# Patient Record
Sex: MALE | Race: WHITE | NOT HISPANIC OR LATINO | Employment: FULL TIME | URBAN - METROPOLITAN AREA
[De-identification: names, ages, dates, MRNs, and addresses within clinical notes are randomized per-mention and may not be internally consistent; named-entity substitution may affect disease eponyms.]

---

## 2017-01-03 ENCOUNTER — HOSPITAL ENCOUNTER (OUTPATIENT)
Dept: RADIATION ONCOLOGY | Facility: MEDICAL CENTER | Age: 57
End: 2017-01-31
Attending: RADIOLOGY
Payer: COMMERCIAL

## 2017-01-03 PROCEDURE — 77387 GUIDANCE FOR RADJ TX DLVR: CPT | Performed by: RADIOLOGY

## 2017-01-03 PROCEDURE — 77427 RADIATION TX MANAGEMENT X5: CPT | Performed by: RADIOLOGY

## 2017-01-03 PROCEDURE — 77412 RADIATION TX DELIVERY LVL 3: CPT | Performed by: RADIOLOGY

## 2017-01-03 PROCEDURE — 77014 PR CT GUIDANCE PLACEMENT RAD THERAPY FIELDS: CPT | Mod: 26 | Performed by: RADIOLOGY

## 2017-02-14 ENCOUNTER — HOSPITAL ENCOUNTER (OUTPATIENT)
Dept: RADIATION ONCOLOGY | Facility: MEDICAL CENTER | Age: 57
End: 2017-02-28
Attending: RADIOLOGY
Payer: COMMERCIAL

## 2017-02-14 DIAGNOSIS — C78.00 PROSTATE CANCER METASTATIC TO LUNG (HCC): Primary | ICD-10-CM

## 2017-02-14 DIAGNOSIS — C61 PROSTATE CANCER METASTATIC TO LUNG (HCC): Primary | ICD-10-CM

## 2017-02-14 PROCEDURE — 99212 OFFICE O/P EST SF 10 MIN: CPT | Performed by: RADIOLOGY

## 2017-02-14 NOTE — PROCEDURES
DATE OF SERVICE:  02/14/2017    REFERRING PHYSICIAN:  Lisandro Price MD    HISTORY OF PRESENT ILLNESS:  He is now about 6 weeks status post radiation   therapy to the left lung and mediastinum, and 2-1/2 months status post   radiation therapy to the humeral head for metastatic prostate cancer.  He said   he is doing well.  The pain in the right shoulder has gotten better.  No   shortness of breath, esophagitis or cough.  He is continuing on Zytiga.  His   PSA has dropped down to 0.3.  He is otherwise doing well without any major   complaints.    PHYSICAL EXAMINATION:  VITAL SIGNS:  Weight 210, temperature 98.3, pulse 74, blood pressure 114/71,   O2 sat 97%.  HEENT:  Normocephalic, atraumatic.  Sclerae are anicteric.  Extraocular   movements intact.  Ears, nose, mouth within normal limits.  No preauricular,   neck, superclavicular or axillary ellen adenopathy.  HEART:  Regular rate.  LUNGS:  Clear.  ABDOMEN:  Soft, nontender without hepatosplenomegaly.  MUSCULOSKELETAL:  He has no tenderness in the right shoulder or humeral head.    IMPRESSION:  Excellent response to radiation.    PLAN:  I would like to see him back in 3 months with a CT scan of the chest at   that time.       ____________________________________     MD CARLOS ENAMORADO / MARSHALL    DD:  02/14/2017 11:27:39  DT:  02/14/2017 11:41:18    D#:  [id number]  Job#:  [id number]

## 2017-02-24 ENCOUNTER — OFFICE VISIT (OUTPATIENT)
Dept: MEDICAL GROUP | Facility: PHYSICIAN GROUP | Age: 57
End: 2017-02-24
Payer: COMMERCIAL

## 2017-02-24 VITALS
TEMPERATURE: 96.8 F | DIASTOLIC BLOOD PRESSURE: 70 MMHG | SYSTOLIC BLOOD PRESSURE: 118 MMHG | HEIGHT: 69 IN | OXYGEN SATURATION: 99 % | RESPIRATION RATE: 16 BRPM | HEART RATE: 83 BPM | BODY MASS INDEX: 31.1 KG/M2 | WEIGHT: 210 LBS

## 2017-02-24 DIAGNOSIS — E66.9 OBESITY (BMI 30-39.9): ICD-10-CM

## 2017-02-24 DIAGNOSIS — K63.5 COLON POLYPS: ICD-10-CM

## 2017-02-24 DIAGNOSIS — N52.9 ERECTILE DYSFUNCTION, UNSPECIFIED ERECTILE DYSFUNCTION TYPE: Chronic | ICD-10-CM

## 2017-02-24 DIAGNOSIS — C80.1 CANCER (HCC): Chronic | ICD-10-CM

## 2017-02-24 PROCEDURE — 99204 OFFICE O/P NEW MOD 45 MIN: CPT | Performed by: NURSE PRACTITIONER

## 2017-02-24 RX ORDER — SILDENAFIL 100 MG/1
100 TABLET, FILM COATED ORAL PRN
Qty: 2 TAB | Refills: 0 | Status: SHIPPED | OUTPATIENT
Start: 2017-02-24 | End: 2017-08-02

## 2017-02-24 ASSESSMENT — PATIENT HEALTH QUESTIONNAIRE - PHQ9: CLINICAL INTERPRETATION OF PHQ2 SCORE: 0

## 2017-02-24 NOTE — MR AVS SNAPSHOT
"        Rhys Yang   2017 4:00 PM   Office Visit   MRN: [id number]    Department:  [facility]   Dept Phone:  [phone]    Description:  Male : 1960   Provider:  ROSALBA Gauthier           Reason for Visit     Establish Care     Orders Needed Colonoscopy       Allergies as of 2017     No Known Allergies      You were diagnosed with     Cancer (CMS-HCC)   [845163]       Colon polyps   [358868]       Obesity (BMI 30-39.9)   [937009]       Erectile dysfunction, unspecified erectile dysfunction type   [3789467]         Vital Signs     Blood Pressure Pulse Temperature Respirations Height Weight    118/70 mmHg 83 36 °C (96.8 °F) 16 1.753 m (5' 9.02\") 95.255 kg (210 lb)    Body Mass Index Oxygen Saturation Smoking Status             31.00 kg/m2 99% Former Smoker         Basic Information     Date Of Birth Sex Race Ethnicity Preferred Language    1960 Male White Non- English      Problem List              ICD-10-CM Priority Class Noted - Resolved    Cancer (CMS-HCC) C80.1   2017 - Present    Colon polyps K63.5   2017 - Present    Obesity (BMI 30-39.9) E66.9   2017 - Present      Health Maintenance        Date Due Completion Dates    IMM DTaP/Tdap/Td Vaccine (1 - Tdap) 1977 ---    COLONOSCOPY 2008 ---    IMM INFLUENZA (1) 2016 ---            Current Immunizations     No immunizations on file.      Below and/or attached are the medications your provider expects you to take. Review all of your home medications and newly ordered medications with your provider and/or pharmacist. Follow medication instructions as directed by your provider and/or pharmacist. Please keep your medication list with you and share with your provider. Update the information when medications are discontinued, doses are changed, or new medications (including over-the-counter products) are added; and carry medication information at all times in the event of " emergency situations     Allergies:  No Known Allergies          Medications  Valid as of: February 24, 2017 -  7:23 PM    Generic Name Brand Name Tablet Size Instructions for use    Abiraterone Acetate   Take  by mouth.        Ascorbic Acid   Take  by mouth.        Cholecalciferol   Take  by mouth.        Cyanocobalamin   Take  by mouth.        Ibuprofen   Take  by mouth.        Leuprolide Acetate (3 Month) (Kit) LUPRON 22.5 MG 22.5 mg by Intramuscular route Once.        Multiple Vitamins-Minerals   Take 1 Tab by mouth every day.        Probiotic Product   Take  by mouth.        Selenium   Take  by mouth.        Sildenafil Citrate (Tab) VIAGRA 100 MG Take 1 Tab by mouth as needed for Erectile Dysfunction.        .                 Medicines prescribed today were sent to:     [facility] [address] [place] [address] [address] [address]    Phone: [phone] Fax: [phone]    Open 24 Hours?: No      Medication refill instructions:       If your prescription bottle indicates you have medication refills left, it is not necessary to call your provider’s office. Please contact your pharmacy and they will refill your medication.    If your prescription bottle indicates you do not have any refills left, you may request refills at any time through one of the following ways: The online [employer] system (except Urgent Care), by calling your provider’s office, or by asking your pharmacy to contact your provider’s office with a refill request. Medication refills are processed only during regular business hours and may not be available until the next business day. Your provider may request additional information or to have a follow-up visit with you prior to refilling your medication.   *Please Note: Medication refills are assigned a new Rx number when refilled electronically. Your pharmacy may indicate that no refills were authorized even though a new prescription for the same  medication is available at the pharmacy. Please request the medicine by name with the pharmacy before contacting your provider for a refill.        Referral     A referral request has been sent to our patient care coordination department. Please allow 3-5 business days for us to process this request and contact you either by phone or mail. If you do not hear from us by the 5th business day, please call us at [phone].           Compassoft Access Code: [email or web address]  Expires: 3/20/2017 10:05 AM    Compassoft  A secure, online tool to manage your health information     [employer]’s Compassoft® is a secure, online tool that connects you to your personalized health information from the privacy of your home -- day or night - making it very easy for you to manage your healthcare. Once the activation process is completed, you can even access your medical information using the Compassoft dominic, which is available for free in the Apple Dominic store or Google Play store.     Compassoft provides the following levels of access (as shown below):   My Chart Features   [facility] Doctor [facility]  Specialists [facility]  Urgent  Care Non-[facility]  Primary Care  Doctor   Email your healthcare team securely and privately 24/7 X X X    Manage appointments: schedule your next appointment; view details of past/upcoming appointments X      Request prescription refills. X      View recent personal medical records, including lab and immunizations X X X X   View health record, including health history, allergies, medications X X X X   Read reports about your outpatient visits, procedures, consult and ER notes X X X X   See your discharge summary, which is a recap of your hospital and/or ER visit that includes your diagnosis, lab results, and care plan. X X       How to register for Compassoft:  1. Go to  [email or web address].  2. Click on the Sign Up Now box, which takes you to the New Member Sign Up page. You will need to provide the  following information:  a. Enter your FookyZ Access Code exactly as it appears at the top of this page. (You will not need to use this code after you’ve completed the sign-up process. If you do not sign up before the expiration date, you must request a new code.)   b. Enter your date of birth.   c. Enter your home email address.   d. Click Submit, and follow the next screen’s instructions.  3. Create a FookyZ ID. This will be your FookyZ login ID and cannot be changed, so think of one that is secure and easy to remember.  4. Create a FookyZ password. You can change your password at any time.  5. Enter your Password Reset Question and Answer. This can be used at a later time if you forget your password.   6. Enter your e-mail address. This allows you to receive e-mail notifications when new information is available in FookyZ.  7. Click Sign Up. You can now view your health information.    For assistance activating your FookyZ account, call [phone]

## 2017-02-25 NOTE — ASSESSMENT & PLAN NOTE
Patient had abnormal colonoscopy 3 years ago with polyps. Was advised to repeat colonoscopy which is due now. Denies any abdominal pain, nausea, vomiting, diarrhea or constipation.

## 2017-02-25 NOTE — ASSESSMENT & PLAN NOTE
Patient was previously diagnosed with prostate cancer in 2007 which metastasized to bone and lung. He has just completed chemotherapy and radiation treatment. It is due to for follow-up scan in May 2017. Patient denies any chest pain, diaphoresis, palpitations, shortness of breath or dyspnea. He experiences hot flashes due to Lupron therapy.

## 2017-02-26 NOTE — PROGRESS NOTES
Chief Complaint   Patient presents with   • Establish Care   • Orders Needed     Colonoscopy        HPI:    Rhys Yang is a 56 y.o. male here to establish care and to discuss the following:    Colon polyps  Patient had abnormal colonoscopy 3 years ago with polyps. Was advised to repeat colonoscopy which is due now. Denies any abdominal pain, nausea, vomiting, diarrhea or constipation.    Cancer (CMS-HCC)  Patient was previously diagnosed with prostate cancer in 2007 which metastasized to bone and lung. He has just completed chemotherapy and radiation treatment. It is due to for follow-up scan in May 2017. Patient denies any chest pain, diaphoresis, palpitations, shortness of breath or dyspnea. He experiences hot flashes due to Lupron therapy.    Obesity (BMI 30-39.9)  Patient is obese with a BMI of 31. 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        Current medicines (including changes today)  Current Outpatient Prescriptions   Medication Sig Dispense Refill   • sildenafil citrate (VIAGRA) 100 MG tablet Take 1 Tab by mouth as needed for Erectile Dysfunction. 2 Tab 0   • Abiraterone Acetate (ZYTIGA PO) Take  by mouth.     • Ascorbic Acid (VITAMIN C PO) Take  by mouth.     • VITAMIN D, CHOLECALCIFEROL, PO Take  by mouth.     • Cyanocobalamin (VITAMIN B 12 PO) Take  by mouth.     • SELENIUM PO Take  by mouth.     • Probiotic Product (PROBIOTIC ADVANCED PO) Take  by mouth.     • leuprolide (LUPRON) 22.5 MG Kit 22.5 mg by Intramuscular route Once.     • IBUPROFEN PO Take  by mouth.     • Multiple Vitamin (MULTIVITAMIN PO) Take 1 Tab by mouth every day.       No current facility-administered medications for this visit.     He  has a past medical history of Cancer (CMS-HCC) (2007); Breath shortness; and Claustrophobia.  He  has past surgical history that includes prostatectomy, radial (2007) and recovery (11/23/2015).  Social History  "  Substance Use Topics   • Smoking status: Former Smoker -- 1.00 packs/day for 15 years     Types: Cigarettes     Quit date: 01/01/1985   • Smokeless tobacco: Current User   • Alcohol Use: 0.0 oz/week     0 Standard drinks or equivalent per week      Comment: 4-8 per week     Social History     Social History Narrative     History reviewed. No pertinent family history.  No family status information on file.         ROS  Review of Systems   Constitutional: Negative for fever, chills, weight loss and malaise/fatigue.   HENT: Negative for ear pain, nosebleeds, congestion, sore throat and neck pain.    Eyes: Negative for blurred vision.   Respiratory: Negative for cough, sputum production, shortness of breath and wheezing.    Cardiovascular: Negative for chest pain, palpitations,  and leg swelling.   Gastrointestinal: Negative for heartburn, nausea, vomiting, diarrhea and abdominal pain.   Genitourinary: Negative for dysuria, urgency and frequency. Positive for Prostatectomy for Prostate Cancer, and ED  Musculoskeletal: Negative for myalgias, back pain and joint pain. Positive Bone Cancer  Skin: Negative for rash and itching.   Neurological: Negative for dizziness, tingling, tremors, sensory change, focal weakness and headaches.   Endo/Heme/Allergies: Does not bruise/bleed easily.   Psychiatric/Behavioral: Negative for depression, anxiety, suicidal ideas, insomnia and memory loss.      All other systems reviewed and are negative except as in HPI.    Health Maintenance:  Colonoscopy: three years ago with polyps     Objective:     Blood pressure 118/70, pulse 83, temperature 36 °C (96.8 °F), resp. rate 16, height 1.753 m (5' 9.02\"), weight 95.255 kg (210 lb), SpO2 99 %. Body mass index is 31 kg/(m^2).  Physical Exam:  Constitutional: Alert, no distress.  Skin: Warm, dry, good turgor, no rashes in visible areas.  Eye: Equal, round and reactive, conjunctiva clear, lids normal.  ENMT: Lips without lesions, good dentition, " oropharynx clear. Ear canals are clear, TMs within normal limits bilaterally.   Neck: Trachea midline, no masses, no thyromegaly. No cervical or supraclavicular lymphadenopathy.  Respiratory: Unlabored respiratory effort, lungs clear to auscultation, no wheezes, no ronchi.  Cardiovascular: Normal S1, S2, no murmur, no edema.  Abdomen: Soft, non-tender, no masses, no hepatosplenomegaly.  Psych: Alert and oriented x3, normal affect and mood.  MS: Ambulates independently with steady gait. Has full range of motion of all extremities and spine.  Neuro: DTRs within normal limits. No neurological deficits.    Assessment and Plan:   The following treatment plan was discussed   1. Cancer (CMS-HCC)      Follow up with oncology   2. Colon polyps  REFERRAL TO GASTROENTEROLOGY    Referral to GI   3. Obesity (BMI 30-39.9)  Patient identified as having weight management issue.  Appropriate orders and counseling given.    Discussed diet and exercise   4. Erectile dysfunction, unspecified erectile dysfunction type  sildenafil citrate (VIAGRA) 100 MG tablet    Viagra for trial   Labs are being ordered by oncology.  Followup: Return if symptoms worsen or fail to improve.   Please note that this dictation was created using voice recognition software. I have made every reasonable attempt to correct obvious errors, but I expect that there are errors of grammar and possibly content that I did not discover before finalizing the note.

## 2017-02-26 NOTE — ASSESSMENT & PLAN NOTE
Patient is obese with a BMI of 31. 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

## 2017-02-28 ENCOUNTER — TELEPHONE (OUTPATIENT)
Dept: MEDICAL GROUP | Facility: PHYSICIAN GROUP | Age: 57
End: 2017-02-28

## 2017-02-28 DIAGNOSIS — N52.9 ERECTILE DYSFUNCTION, UNSPECIFIED ERECTILE DYSFUNCTION TYPE: ICD-10-CM

## 2017-02-28 RX ORDER — SILDENAFIL CITRATE 20 MG/1
TABLET ORAL
Qty: 4 TAB | Refills: 0 | Status: SHIPPED | OUTPATIENT
Start: 2017-02-28 | End: 2017-06-30

## 2017-02-28 NOTE — TELEPHONE ENCOUNTER
Received fax from pharmacy stating Viagra is at $60 a pill while sildenafil 20mg is $4 a pill. Please send in script for the cheaper version if you see fit.  Thanks.

## 2017-03-01 NOTE — TELEPHONE ENCOUNTER
I sent a prescription for 4 pill of sildenafil 20mg, and he will need to take two instead of one, but I'm not sure it is as effective

## 2017-03-03 ENCOUNTER — HOSPITAL ENCOUNTER (OUTPATIENT)
Dept: RADIOLOGY | Facility: MEDICAL CENTER | Age: 57
End: 2017-03-03
Attending: EMERGENCY MEDICINE
Payer: COMMERCIAL

## 2017-03-03 ENCOUNTER — OFFICE VISIT (OUTPATIENT)
Dept: URGENT CARE | Facility: PHYSICIAN GROUP | Age: 57
End: 2017-03-03
Payer: COMMERCIAL

## 2017-03-03 VITALS
RESPIRATION RATE: 14 BRPM | SYSTOLIC BLOOD PRESSURE: 152 MMHG | DIASTOLIC BLOOD PRESSURE: 84 MMHG | TEMPERATURE: 96.9 F | OXYGEN SATURATION: 94 % | HEART RATE: 62 BPM

## 2017-03-03 DIAGNOSIS — M89.8X9 BONE PAIN: ICD-10-CM

## 2017-03-03 PROCEDURE — 93971 EXTREMITY STUDY: CPT | Mod: 26 | Performed by: SURGERY

## 2017-03-03 PROCEDURE — 99214 OFFICE O/P EST MOD 30 MIN: CPT | Performed by: EMERGENCY MEDICINE

## 2017-03-03 PROCEDURE — 93971 EXTREMITY STUDY: CPT

## 2017-03-03 PROCEDURE — 73060 X-RAY EXAM OF HUMERUS: CPT | Mod: RT

## 2017-03-03 PROCEDURE — A4565 SLINGS: HCPCS | Performed by: EMERGENCY MEDICINE

## 2017-03-03 ASSESSMENT — ENCOUNTER SYMPTOMS
HEMOPTYSIS: 0
BACK PAIN: 1
DIARRHEA: 0
TINGLING: 0
FALLS: 0
SPEECH CHANGE: 0
NAUSEA: 1
ABDOMINAL PAIN: 0
COUGH: 0
FEVER: 0
MUSCLE WEAKNESS: 0
CONSTIPATION: 0
CHILLS: 0
EYE DISCHARGE: 0
EYE REDNESS: 0
NUMBNESS: 0
VOMITING: 0
ORTHOPNEA: 0

## 2017-03-03 NOTE — PATIENT INSTRUCTIONS
I will call the patient with the ultrasound results. Patient will call radiation oncologist who is a friend of his for follow-up.

## 2017-03-03 NOTE — MR AVS SNAPSHOT
Rhys Yagn   3/3/2017 10:30 AM   Office Visit   MRN: [id number]    Department:  [facility]   Dept Phone:  [phone]    Description:  Male : 1960   Provider:  HARISH Donovan M.D.           Reason for Visit     Arm Pain R arm while moving hay      Allergies as of 3/3/2017     No Known Allergies      You were diagnosed with     Bone pain   [960362]         Vital Signs     Blood Pressure Pulse Temperature Respirations Oxygen Saturation Smoking Status    152/84 mmHg 62 36.1 °C (96.9 °F) 14 94% Former Smoker      Basic Information     Date Of Birth Sex Race Ethnicity Preferred Language    1960 Male White Non- English      Your appointments     Mar 03, 2017  3:15 PM   UPPER EXTREMITY VENOUS MAPPING with VASCULAR LAB [facility], [facility] EXAM 6   NON-INVASIVE LAB [facility] ([facility])    [address] [facility]  [place] [id number] [phone]           No prep              Problem List              ICD-10-CM Priority Class Noted - Resolved    Cancer (CMS-HCC) C80.1   2017 - Present    Colon polyps K63.5   2017 - Present    Obesity (BMI 30-39.9) E66.9   2017 - Present      Health Maintenance        Date Due Completion Dates    IMM DTaP/Tdap/Td Vaccine (1 - Tdap) 1977 ---    COLONOSCOPY 2008 ---    IMM INFLUENZA (1) 2016 ---            Current Immunizations     No immunizations on file.      Below and/or attached are the medications your provider expects you to take. Review all of your home medications and newly ordered medications with your provider and/or pharmacist. Follow medication instructions as directed by your provider and/or pharmacist. Please keep your medication list with you and share with your provider. Update the information when medications are discontinued, doses are changed, or new medications (including over-the-counter products) are added; and carry medication information at all times in the event of emergency situations     Allergies:  No Known  Allergies          Medications  Valid as of: March 03, 2017 - 12:42 PM    Generic Name Brand Name Tablet Size Instructions for use    Abiraterone Acetate   Take  by mouth.        Ascorbic Acid   Take  by mouth.        Cholecalciferol   Take  by mouth.        Cyanocobalamin   Take  by mouth.        Ibuprofen   Take  by mouth.        Leuprolide Acetate (3 Month) (Kit) LUPRON 22.5 MG 22.5 mg by Intramuscular route Once.        Multiple Vitamins-Minerals   Take 1 Tab by mouth every day.        Probiotic Product   Take  by mouth.        Selenium   Take  by mouth.        Sildenafil Citrate (Tab) VIAGRA 100 MG Take 1 Tab by mouth as needed for Erectile Dysfunction.        Sildenafil Citrate (Tab) REVATIO 20 MG Take 2 tablets orally when necessary.        .                 Medicines prescribed today were sent to:     [facility] [address] [place] [address]    Phone: [phone] Fax: [phone]    Open 24 Hours?: No      Medication refill instructions:       If your prescription bottle indicates you have medication refills left, it is not necessary to call your provider’s office. Please contact your pharmacy and they will refill your medication.    If your prescription bottle indicates you do not have any refills left, you may request refills at any time through one of the following ways: The online [employer] system (except Urgent Care), by calling your provider’s office, or by asking your pharmacy to contact your provider’s office with a refill request. Medication refills are processed only during regular business hours and may not be available until the next business day. Your provider may request additional information or to have a follow-up visit with you prior to refilling your medication.   *Please Note: Medication refills are assigned a new Rx number when refilled electronically. Your pharmacy may indicate that no refills were authorized even though a new  prescription for the same medication is available at the pharmacy. Please request the medicine by name with the pharmacy before contacting your provider for a refill.        Your To Do List     Future Labs/Procedures Complete By Expires    DX-HUMERUS 2+ RIGHT  As directed 9/3/2017    US-EXTREMITY VENOUS UNILATERAL-UPPER RIGHT  As directed 3/3/2018         CelluComp Access Code: [email or web address]  Expires: 3/20/2017 10:05 AM    CelluComp  A secure, online tool to manage your health information     [employer]’s CelluComp® is a secure, online tool that connects you to your personalized health information from the privacy of your home -- day or night - making it very easy for you to manage your healthcare. Once the activation process is completed, you can even access your medical information using the CelluComp dominic, which is available for free in the Apple Dominic store or Google Play store.     CelluComp provides the following levels of access (as shown below):   My Chart Features   [facility] Primary Care Doctor [facility]  Specialists [facility]  Urgent  Care [facility]  Primary Care  Doctor   Email your healthcare team securely and privately 24/7 X X X    Manage appointments: schedule your next appointment; view details of past/upcoming appointments X      Request prescription refills. X      View recent personal medical records, including lab and immunizations X X X X   View health record, including health history, allergies, medications X X X X   Read reports about your outpatient visits, procedures, consult and ER notes X X X X   See your discharge summary, which is a recap of your hospital and/or ER visit that includes your diagnosis, lab results, and care plan. X X       How to register for CelluComp:  1. Go to  [email or web address].  2. Click on the Sign Up Now box, which takes you to the New Member Sign Up page. You will need to provide the following information:  a. Enter your CelluComp Access Code exactly as it appears at the  top of this page. (You will not need to use this code after you’ve completed the sign-up process. If you do not sign up before the expiration date, you must request a new code.)   b. Enter your date of birth.   c. Enter your home email address.   d. Click Submit, and follow the next screen’s instructions.  3. Create a Radius ID. This will be your Radius login ID and cannot be changed, so think of one that is secure and easy to remember.  4. Create a Radius password. You can change your password at any time.  5. Enter your Password Reset Question and Answer. This can be used at a later time if you forget your password.   6. Enter your e-mail address. This allows you to receive e-mail notifications when new information is available in Radius.  7. Click Sign Up. You can now view your health information.    For assistance activating your Radius account, call [phone]

## 2017-03-03 NOTE — PROGRESS NOTES
Subjective:      Rhys Yang is a 56 y.o. male who presents with Arm Pain            Arm Pain   The incident occurred 2 days ago. Incident location: patient's pain started when he was bucking hay in one of the nails was frozen to the truck bed. Injury mechanism: patient may have injured himself attempting to jerk a 200 pound bail of a hay off a frozen truck bed. The pain is present in the upper right arm. The quality of the pain is described as aching. The pain does not radiate. Pain scale: patient's pain waxes and wanes but is severe at times. The pain is severe. The pain has been intermittent since the incident. Pertinent negatives include no chest pain, muscle weakness, numbness or tingling. The symptoms are aggravated by movement.    patient has a history of prostate CA with metastasis to his lungs and right humerus. Patient status post chemoradiation with a normal PSA has a day before yesterday    No known drug allergies  Social History     Social History   • Marital Status:      Spouse Name: N/A   • Number of Children: N/A   • Years of Education: N/A     Occupational History   • Not on file.     Social History Main Topics   • Smoking status: Former Smoker -- 1.00 packs/day for 15 years     Types: Cigarettes     Quit date: 01/01/1985   • Smokeless tobacco: Current User   • Alcohol Use: 0.0 oz/week     0 Standard drinks or equivalent per week      Comment: 4-8 per week   • Drug Use: No   • Sexual Activity: Not on file     Other Topics Concern   • Not on file     Social History Narrative     Past Medical History   Diagnosis Date   • Cancer ([facility]) 2007     prostate   • Breath shortness    • Claustrophobia      Current Outpatient Prescriptions on File Prior to Visit   Medication Sig Dispense Refill   • sildenafil (REVATIO) 20 MG tablet Take 2 tablets orally when necessary. 4 Tab 0   • sildenafil citrate (VIAGRA) 100 MG tablet Take 1 Tab by mouth as needed for Erectile Dysfunction. 2 Tab 0   •  Abiraterone Acetate (ZYTIGA PO) Take  by mouth.     • Ascorbic Acid (VITAMIN C PO) Take  by mouth.     • VITAMIN D, CHOLECALCIFEROL, PO Take  by mouth.     • Cyanocobalamin (VITAMIN B 12 PO) Take  by mouth.     • SELENIUM PO Take  by mouth.     • Probiotic Product (PROBIOTIC ADVANCED PO) Take  by mouth.     • leuprolide (LUPRON) 22.5 MG Kit 22.5 mg by Intramuscular route Once.     • IBUPROFEN PO Take  by mouth.     • Multiple Vitamin (MULTIVITAMIN PO) Take 1 Tab by mouth every day.       No current facility-administered medications on file prior to visit.   No family history on file.    Review of Systems   Constitutional: Negative for fever and chills.   Eyes: Negative for discharge and redness.   Respiratory: Negative for cough and hemoptysis.    Cardiovascular: Negative for chest pain and orthopnea.   Gastrointestinal: Positive for nausea. Negative for vomiting, abdominal pain, diarrhea and constipation.   Genitourinary: Negative.    Musculoskeletal: Positive for back pain. Negative for joint pain and falls.   Skin: Negative for itching and rash.   Neurological: Negative for tingling, speech change and numbness.   Psychiatric/Behavioral: Nervous/anxious: patient's wife is moderately anxious due to the patient recently being treated for prostate CA with radiation and chemotherapy.           Objective:     /84 mmHg  Pulse 62  Temp(Src) 36.1 °C (96.9 °F)  Resp 14  SpO2 94%     Physical Exam   Constitutional: He is oriented to person, place, and time. He appears well-developed and well-nourished. He appears distressed.   HENT:   Head: Normocephalic and atraumatic.   Right Ear: External ear normal.   Left Ear: External ear normal.   Neck: Normal range of motion. Tracheal deviation present.   Cardiovascular: Normal rate and regular rhythm.    Pulmonary/Chest: Effort normal and breath sounds normal. Stridor present.   Abdominal: He exhibits no distension. There is no tenderness.   Musculoskeletal:   Patient's  very tender over his left shoulder primarily the proximal humerus. He has no clavicular tenderness no midline neck tenderness. He has a normal range of motion distal neurovascular exam is intact from his elbow to his hand.   Neurological: He is alert and oriented to person, place, and time.   Skin: Skin is warm and dry. He is not diaphoretic. There is erythema.   Psychiatric: He has a normal mood and affect.   Vitals reviewed.           Diagnosis x-ray of the left humerus shows a metastatic lesion in the proximal humeral head. Unable to determine whether this cortical defect is new or old. Patient has no previous x-rays of his studies have been based on CT.   Assessment/Plan:   Diagnosis:  1. Bone pain right proximal humerus.    2. Metastatic lesion right proximal humerus rule out pathologic fracture.      Patient will be given a sling and a referral to oncology patient states his friends with Dr. Doss and can call her for follow-up appointment today. Patient will get an ultrasound to make sure there was no hypercoagulability DVT present in his left arm. Patient may have a pathologic fracture through a previous metastatic lesion in his left arm.  - DX-HUMERUS 2+ RIGHT; Future  - US-EXTREMITY VENOUS UNILATERAL-UPPER RIGHT; Future

## 2017-05-01 ENCOUNTER — HOSPITAL ENCOUNTER (OUTPATIENT)
Dept: RADIATION ONCOLOGY | Facility: MEDICAL CENTER | Age: 57
End: 2017-05-31
Attending: RADIOLOGY
Payer: COMMERCIAL

## 2017-05-02 ENCOUNTER — HOSPITAL ENCOUNTER (OUTPATIENT)
Dept: RADIOLOGY | Facility: MEDICAL CENTER | Age: 57
End: 2017-05-02
Attending: INTERNAL MEDICINE
Payer: COMMERCIAL

## 2017-05-02 DIAGNOSIS — C61 MALIGNANT NEOPLASM OF PROSTATE (HCC): ICD-10-CM

## 2017-05-02 PROCEDURE — A9503 TC99M MEDRONATE: HCPCS

## 2017-05-02 PROCEDURE — 71260 CT THORAX DX C+: CPT

## 2017-05-02 PROCEDURE — 700117 HCHG RX CONTRAST REV CODE 255: Performed by: INTERNAL MEDICINE

## 2017-05-02 RX ADMIN — IOHEXOL 100 ML: 350 INJECTION, SOLUTION INTRAVENOUS at 14:05

## 2017-05-05 ENCOUNTER — HOSPITAL ENCOUNTER (OUTPATIENT)
Dept: RADIATION ONCOLOGY | Facility: MEDICAL CENTER | Age: 57
End: 2017-05-05

## 2017-05-05 VITALS
TEMPERATURE: 97.3 F | HEART RATE: 82 BPM | WEIGHT: 206 LBS | SYSTOLIC BLOOD PRESSURE: 108 MMHG | DIASTOLIC BLOOD PRESSURE: 75 MMHG | OXYGEN SATURATION: 99 % | BODY MASS INDEX: 30.41 KG/M2 | RESPIRATION RATE: 16 BRPM

## 2017-05-05 PROCEDURE — 77290 THER RAD SIMULAJ FIELD CPLX: CPT | Mod: 26 | Performed by: RADIOLOGY

## 2017-05-05 PROCEDURE — 77263 THER RADIOLOGY TX PLNG CPLX: CPT | Performed by: RADIOLOGY

## 2017-05-05 PROCEDURE — 77334 RADIATION TREATMENT AID(S): CPT | Mod: 26 | Performed by: RADIOLOGY

## 2017-05-05 PROCEDURE — 77290 THER RAD SIMULAJ FIELD CPLX: CPT | Performed by: RADIOLOGY

## 2017-05-05 PROCEDURE — 77470 SPECIAL RADIATION TREATMENT: CPT | Performed by: RADIOLOGY

## 2017-05-05 PROCEDURE — 77334 RADIATION TREATMENT AID(S): CPT | Performed by: RADIOLOGY

## 2017-05-05 RX ORDER — PREDNISONE 10 MG/1
10 TABLET ORAL DAILY
COMMUNITY
End: 2017-08-06

## 2017-05-05 RX ORDER — OXYCODONE AND ACETAMINOPHEN 10; 325 MG/1; MG/1
1-2 TABLET ORAL EVERY 4 HOURS PRN
COMMUNITY
End: 2017-08-02

## 2017-05-05 RX ORDER — AMOXICILLIN 500 MG/1
500 CAPSULE ORAL 3 TIMES DAILY
COMMUNITY
End: 2017-06-30

## 2017-05-05 ASSESSMENT — PAIN SCALES - GENERAL: PAINLEVEL_OUTOF10: 4

## 2017-05-05 NOTE — PROGRESS NOTES
RADIATION ONCOLOGY FOLLOW-UP    DATE OF SERVICE: 5/5/2017    IDENTIFICATION:   A 56 y.o. male with metastatic prostate cancer now with a painful bony metastasis in the lower T-spine.  Bone scan done 5/2/2017 shows a developing met in the T10 vertebral body is also increasing metastatic disease within the right humerus in an area that has been treated. Her is some increased activity in the pelvis and proximal right femur is also new. He only pain is complaining by mouth and lower T-spine    HISTORY OF PRESENT ILLNESS:   History has been doing well with his known metastatic prostate cancer but more recently has been complaining of some pain in the T-spine. Bone scan done 5/2/2017 shows a developing met in the T10 vertebral body is also increasing metastatic disease within the right humerus in an area that has been treated. Her is some increased activity in the pelvis and proximal right femur is also new. He only pain is complaining by mouth and lower T-spine.      CURRENT MEDICATIONS:  Current Outpatient Prescriptions   Medication Sig Dispense Refill   • predniSONE (DELTASONE) 10 MG Tab Take 10 mg by mouth 2 times a day.     • amoxicillin (AMOXIL) 500 MG Cap Take 500 mg by mouth 3 times a day.     • oxycodone-acetaminophen (PERCOCET-10)  MG Tab Take 1-2 Tabs by mouth every four hours as needed for Severe Pain.     • sildenafil (REVATIO) 20 MG tablet Take 2 tablets orally when necessary. 4 Tab 0   • sildenafil citrate (VIAGRA) 100 MG tablet Take 1 Tab by mouth as needed for Erectile Dysfunction. 2 Tab 0   • Abiraterone Acetate (ZYTIGA PO) Take  by mouth.     • Ascorbic Acid (VITAMIN C PO) Take  by mouth.     • VITAMIN D, CHOLECALCIFEROL, PO Take  by mouth.     • Cyanocobalamin (VITAMIN B 12 PO) Take  by mouth.     • SELENIUM PO Take  by mouth.     • Probiotic Product (PROBIOTIC ADVANCED PO) Take  by mouth.     • leuprolide (LUPRON) 22.5 MG Kit 22.5 mg by Intramuscular route Once.     • IBUPROFEN PO Take  by  mouth.     • Multiple Vitamin (MULTIVITAMIN PO) Take 1 Tab by mouth every day.       No current facility-administered medications for this encounter.       ALLERGIES:  Review of patient's allergies indicates no known allergies.    FAMILY HISTORY:    Father-lung cancer        SOCIAL HISTORY: Reports 15 pack year smoking history, drinks ETOH occasionally, denies use of illicit drugs.  Patient is , lives with spouse is [place], [place].       REVIEW OF SYSTEMS: Pertinent positives consist of fatigue hot flashes hearing loss and visual difficulties urinary frequency urgency incontinence, muscle pain joint pain cough shortness of breath from probable radiation pneumonitis on prednisone. Anxiety. The rest of review of systems is negative and is in the E MR    PHYSICAL EXAM:    Filed Vitals:    05/05/17 1001   BP: 108/75   Pulse: 82   Temp: 36.3 °C (97.3 °F)   Resp: 16   Weight: 93.441 kg (206 lb)   SpO2: 99%   Pain 4/10  Location: Back. T-Spine      What makes the pain better: Ibuprofen  What makes the pain worse: movement, exertion  Pain controlled with current regimen: yes  Pain related to condition being seen here for: yes    GENERAL: Well-appearing alert and oriented ×3 in no apparent distress  Musculoskeletal: He has pain in the lower thoracic spine in the area of known disease on bone scan no other pain is elicited  HEENT:  Pupils are equal, round, and reactive to light.  Extraocular muscles   are intact. Sclerae nonicteric.  Conjunctivae pink.  Oral cavity, tongue   protrudes midline.   NECK:  Supple without evidence of thyromegaly.  NODES:  No peripheral adenopathy of the neck, supraclavicular fossa or axillae   bilaterally.  LUNGS:  Clear to ascultation and resonant to percussion.  HEART:  Regular rate and rhythm.  No murmur appreciated  ABDOMEN:  Soft. No evidence of hepatosplenomegaly.  Positive bowel sounds.  EXTREMITIES:  Without Edema.  NEUROLOGIC:  Cranial nerves II through XII were intact.  Strength is 5/5  in   lower extremities bilaterally.  DTRs were symmetrical.  There was no focal   sensory deficit appreciated.    ECOG PERFORMANCE STATUS:  0= Fully active, able to carry on all pre-disease performance without restriction.    LABORATORY DATA:   Lab Results   Component Value Date/Time    SODIUM 135 10/31/2007 05:35 AM    POTASSIUM 4.3 10/31/2007 05:35 AM    CHLORIDE 106 10/31/2007 05:35 AM    CO2 25 10/31/2007 05:35 AM    GLUCOSE 142* 10/31/2007 05:35 AM    BUN 10 10/31/2007 05:35 AM    CREATININE 0.9 10/31/2007 05:35 AM     Lab Results   Component Value Date/Time    ALKALINE PHOSPHATASE 76 10/25/2007 02:25 PM    AST(SGOT) 19 10/25/2007 02:25 PM    ALT(SGPT) 34 10/25/2007 02:25 PM    TOTAL BILIRUBIN 0.8 10/25/2007 02:25 PM      Lab Results   Component Value Date/Time    WBC 10.2 10/31/2007 05:35 AM    RBC 4.05* 10/31/2007 05:35 AM    HEMOGLOBIN 12.8* 10/31/2007 05:35 AM    HEMATOCRIT 35.6* 10/31/2007 05:35 AM    MCV 87.9 10/31/2007 05:35 AM    MCH 31.5 10/31/2007 05:35 AM    MCHC 35.9* 10/31/2007 05:35 AM    MPV 6.4* 10/31/2007 05:35 AM    NEUTROPHILS-POLYS 40.0* 10/25/2007 02:25 PM    LYMPHOCYTES 46.0* 10/25/2007 02:25 PM    MONOCYTES 11.0* 10/25/2007 02:25 PM    EOSINOPHILS 2.0 10/25/2007 02:25 PM    BASOPHILS 0.0 10/25/2007 02:25 PM        RADIOLOGY DATA:  HPI    IMPRESSION:    A 56 y.o. with prostatic prostate cancer to bone now with painful T-spine met.    RECOMMENDATIONS:   I'm recommending palliative radiation therapy to the T 10 vertebrae. I discussed the details of the radiation along with side effects both acute and chronic including but not exclusive to generalized fatigue damage to the tissues within the treatment field including a FLAIR in pain . He also knows it can be vertebral body collapse      Thank you for the opportunity to participate in his care.  If any questions or comments, please do not hesitate in calling.

## 2017-05-09 PROCEDURE — 77334 RADIATION TREATMENT AID(S): CPT | Mod: 26 | Performed by: RADIOLOGY

## 2017-05-09 PROCEDURE — 77295 3-D RADIOTHERAPY PLAN: CPT | Performed by: RADIOLOGY

## 2017-05-09 PROCEDURE — 77295 3-D RADIOTHERAPY PLAN: CPT | Mod: 26 | Performed by: RADIOLOGY

## 2017-05-09 PROCEDURE — 77334 RADIATION TREATMENT AID(S): CPT | Performed by: RADIOLOGY

## 2017-05-09 PROCEDURE — 77300 RADIATION THERAPY DOSE PLAN: CPT | Mod: 26 | Performed by: RADIOLOGY

## 2017-05-09 PROCEDURE — 77300 RADIATION THERAPY DOSE PLAN: CPT | Performed by: RADIOLOGY

## 2017-05-10 ENCOUNTER — PATIENT OUTREACH (OUTPATIENT)
Dept: OTHER | Facility: MEDICAL CENTER | Age: 57
End: 2017-05-10

## 2017-05-10 ENCOUNTER — HOSPITAL ENCOUNTER (OUTPATIENT)
Dept: RADIATION ONCOLOGY | Facility: MEDICAL CENTER | Age: 57
End: 2017-05-10

## 2017-05-10 DIAGNOSIS — C80.1 CANCER (HCC): ICD-10-CM

## 2017-05-10 PROCEDURE — 77373 STRTCTC BDY RAD THER TX DLVR: CPT | Performed by: RADIOLOGY

## 2017-05-10 PROCEDURE — 77370 RADIATION PHYSICS CONSULT: CPT | Performed by: RADIOLOGY

## 2017-05-10 PROCEDURE — 77280 THER RAD SIMULAJ FIELD SMPL: CPT | Performed by: RADIOLOGY

## 2017-05-10 RX ORDER — OXYCODONE AND ACETAMINOPHEN 10; 325 MG/1; MG/1
1-2 TABLET ORAL EVERY 4 HOURS PRN
Qty: 100 TAB | Refills: 0 | Status: SHIPPED | OUTPATIENT
Start: 2017-05-10 | End: 2017-08-02

## 2017-05-10 NOTE — PROGRESS NOTES
Met with Rhys and his wife prior to treatment today.  Pt states he didn't think he would need to come back.  Enquired if pt would like to schedule healing touch.  Pt would like as many sessions as possible.  Scheduled for two visits.  Denies other needs at this time. Gave him my card again. Encouraged to call with questions or needs.

## 2017-05-11 ENCOUNTER — HOSPITAL ENCOUNTER (OUTPATIENT)
Dept: RADIATION ONCOLOGY | Facility: MEDICAL CENTER | Age: 57
End: 2017-05-11

## 2017-05-11 PROCEDURE — 77280 THER RAD SIMULAJ FIELD SMPL: CPT | Mod: 26 | Performed by: RADIOLOGY

## 2017-05-11 PROCEDURE — 77373 STRTCTC BDY RAD THER TX DLVR: CPT | Performed by: RADIOLOGY

## 2017-05-11 PROCEDURE — 77280 THER RAD SIMULAJ FIELD SMPL: CPT | Performed by: RADIOLOGY

## 2017-05-12 ENCOUNTER — HOSPITAL ENCOUNTER (OUTPATIENT)
Dept: RADIATION ONCOLOGY | Facility: MEDICAL CENTER | Age: 57
End: 2017-05-12

## 2017-05-12 PROCEDURE — 77280 THER RAD SIMULAJ FIELD SMPL: CPT | Performed by: RADIOLOGY

## 2017-05-12 PROCEDURE — 77373 STRTCTC BDY RAD THER TX DLVR: CPT | Performed by: RADIOLOGY

## 2017-05-12 PROCEDURE — 77336 RADIATION PHYSICS CONSULT: CPT | Performed by: RADIOLOGY

## 2017-05-12 PROCEDURE — 77280 THER RAD SIMULAJ FIELD SMPL: CPT | Mod: 26 | Performed by: RADIOLOGY

## 2017-05-15 ENCOUNTER — HOSPITAL ENCOUNTER (OUTPATIENT)
Dept: RADIATION ONCOLOGY | Facility: MEDICAL CENTER | Age: 57
End: 2017-05-15

## 2017-05-15 PROCEDURE — 77280 THER RAD SIMULAJ FIELD SMPL: CPT | Performed by: RADIOLOGY

## 2017-05-15 PROCEDURE — 77280 THER RAD SIMULAJ FIELD SMPL: CPT | Mod: 26 | Performed by: RADIOLOGY

## 2017-05-15 PROCEDURE — 77373 STRTCTC BDY RAD THER TX DLVR: CPT | Performed by: RADIOLOGY

## 2017-05-16 ENCOUNTER — HOSPITAL ENCOUNTER (OUTPATIENT)
Dept: RADIATION ONCOLOGY | Facility: MEDICAL CENTER | Age: 57
End: 2017-05-16

## 2017-05-16 PROCEDURE — 77280 THER RAD SIMULAJ FIELD SMPL: CPT | Performed by: RADIOLOGY

## 2017-05-16 PROCEDURE — 77373 STRTCTC BDY RAD THER TX DLVR: CPT | Performed by: RADIOLOGY

## 2017-05-16 PROCEDURE — 77280 THER RAD SIMULAJ FIELD SMPL: CPT | Mod: 26 | Performed by: RADIOLOGY

## 2017-05-17 ENCOUNTER — PATIENT OUTREACH (OUTPATIENT)
Dept: OTHER | Facility: MEDICAL CENTER | Age: 57
End: 2017-05-17

## 2017-05-17 NOTE — PROGRESS NOTES
Telephone call from patient's wife.  She is interested in healing touch and mind body techniques.  Rhys had a healing touch session yesterday and the practitioner said she would be willing to see Diana.  Scheduled her for healing touch on 5/23 at 1530.  Telephone call placed to mind body practitioner.  Left message for her to call back.  Notified Diana of the above.

## 2017-05-22 ENCOUNTER — PATIENT OUTREACH (OUTPATIENT)
Dept: OTHER | Facility: MEDICAL CENTER | Age: 57
End: 2017-05-22

## 2017-06-20 NOTE — PROGRESS NOTES
ADDENDUM:    The clinical condition of this patient has not changed since 5/5/17 office visit; and patient is still having pain in right humerus; therefore it is necessary to re-evaluate patient with MRI using IV sedation due to claustrophobia.      ARIA JOURNAL NOTE:    Spoke with patient today he still having pain in his right humerus. Bone scan shows possible increase in disease. Because of this were going to order an MRI scan. And we will see him in follow-up after that.  KODY Doss MD

## 2017-06-21 NOTE — ADDENDUM NOTE
Encounter addended by: Nyla Sanches R.N. on: 6/20/2017  5:10 PM<BR>     Documentation filed: Notes Section

## 2017-06-22 ENCOUNTER — TELEPHONE (OUTPATIENT)
Dept: RADIATION ONCOLOGY | Facility: MEDICAL CENTER | Age: 57
End: 2017-06-22

## 2017-06-22 NOTE — TELEPHONE ENCOUNTER
PC from pt stating he is still having pain in right humerus and is concerned about disease progress. Pt is claustrophobic and will need anesthesia prior to scan.   Dc rn    See note from Dr Doss below:    Dr KODY Doss journal note:  Spoke with patient today he still having pain in his right humerus. Bone scan shows possible increase in disease. Because of this were going to order an MRI scan. And we will see him in follow-up after that  DEIRDRE RADFORD

## 2017-06-30 ENCOUNTER — HOSPITAL ENCOUNTER (OUTPATIENT)
Dept: RADIATION ONCOLOGY | Facility: MEDICAL CENTER | Age: 57
End: 2017-06-30
Attending: RADIOLOGY
Payer: COMMERCIAL

## 2017-06-30 VITALS
OXYGEN SATURATION: 98 % | WEIGHT: 207.2 LBS | SYSTOLIC BLOOD PRESSURE: 125 MMHG | BODY MASS INDEX: 30.58 KG/M2 | TEMPERATURE: 98.8 F | HEART RATE: 81 BPM | DIASTOLIC BLOOD PRESSURE: 84 MMHG

## 2017-06-30 PROCEDURE — 99212 OFFICE O/P EST SF 10 MIN: CPT | Performed by: RADIOLOGY

## 2017-06-30 ASSESSMENT — PAIN SCALES - GENERAL: PAINLEVEL_OUTOF10: 1

## 2017-06-30 NOTE — NON-PROVIDER
Patient was seen today in clinic with Dr. Doss for follow up.  Vitals signs and weight were obtained and pain assessment was completed.  Allergies and medications were reviewed with the patient.  Toxicities of treatment assessed.     Vitals/Pain:  Filed Vitals:    06/30/17 1407   BP: 125/84   Pulse: 81   Temp: 37.1 °C (98.8 °F)   Weight: 93.985 kg (207 lb 3.2 oz)   SpO2: 98%   PAIN;  0-1  Location: Shoulder, right        Allergies:   Review of patient's allergies indicates no known allergies.    Current Medications:  Current Outpatient Prescriptions   Medication Sig Dispense Refill   • oxycodone-acetaminophen (PERCOCET-10)  MG Tab Take 1-2 Tabs by mouth every four hours as needed for Severe Pain. 100 Tab 0   • predniSONE (DELTASONE) 10 MG Tab Take 10 mg by mouth 2 times a day.     • Abiraterone Acetate (ZYTIGA PO) Take  by mouth.     • Ascorbic Acid (VITAMIN C PO) Take  by mouth.     • VITAMIN D, CHOLECALCIFEROL, PO Take  by mouth.     • Cyanocobalamin (VITAMIN B 12 PO) Take  by mouth.     • SELENIUM PO Take  by mouth.     • Probiotic Product (PROBIOTIC ADVANCED PO) Take  by mouth.     • leuprolide (LUPRON) 22.5 MG Kit 22.5 mg by Intramuscular route Once.     • Multiple Vitamin (MULTIVITAMIN PO) Take 1 Tab by mouth every day.     • oxycodone-acetaminophen (PERCOCET-10)  MG Tab Take 1-2 Tabs by mouth every four hours as needed for Severe Pain.     • sildenafil citrate (VIAGRA) 100 MG tablet Take 1 Tab by mouth as needed for Erectile Dysfunction. 2 Tab 0   • IBUPROFEN PO Take  by mouth.       No current facility-administered medications for this encounter.          PCP:  Cuong Sanches R.N.  6/30/2017  2:11 PM

## 2017-06-30 NOTE — PROGRESS NOTES
RADIATION ONCOLOGY FOLLOW-UP    DATE OF SERVICE: 6/30/2017    IDENTIFICATION:   A 57 y.o. 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 In November his lung was treated as as well as his right humerus. The right humerus then fractured but it has been healed by secondary intent.      HISTORY OF PRESENT ILLNESS:   More recently had repeat CT scans of the chest abdomen pelvis and bone scan done on 5/2/2017. Bone scan showed increasing metastasis within the right humerus developing metastases within the T10 vertebral body and foci of increased activity within the pelvis and proximal right femur new since prior examination concerning for metastatic lesions there is also tiny foci of increased uptake in the T-spine could be arthritic possibly early metastases. The CT scan shows interval complete resolution of the left suprahilar and hilar lymphadenopathy consistent with response to therapy there still is radiation pneumonitis present there is no change in the 2 mm right lower lobe lung nodule. There is increased mixed sclerosis and lucency in the right proximal humerus consistent with metastases, evidence of prior prostatectomy and pelvic node dissection. Currently he is doing well his biggest complaint is hot flashes and occasional depression and occasional pain in his right humerus but when he moved to sit around it hurts S.    CURRENT MEDICATIONS:  Current Outpatient Prescriptions   Medication Sig Dispense Refill   • oxycodone-acetaminophen (PERCOCET-10)  MG Tab Take 1-2 Tabs by mouth every four hours as needed for Severe Pain. 100 Tab 0   • predniSONE (DELTASONE) 10 MG Tab Take 10 mg by mouth 2 times a day.     • Abiraterone Acetate (ZYTIGA PO) Take  by mouth.     • Ascorbic Acid (VITAMIN C PO) Take  by mouth.     • VITAMIN D, CHOLECALCIFEROL, PO Take  by mouth.     • Cyanocobalamin  (VITAMIN B 12 PO) Take  by mouth.     • SELENIUM PO Take  by mouth.     • Probiotic Product (PROBIOTIC ADVANCED PO) Take  by mouth.     • leuprolide (LUPRON) 22.5 MG Kit 22.5 mg by Intramuscular route Once.     • Multiple Vitamin (MULTIVITAMIN PO) Take 1 Tab by mouth every day.     • oxycodone-acetaminophen (PERCOCET-10)  MG Tab Take 1-2 Tabs by mouth every four hours as needed for Severe Pain.     • sildenafil citrate (VIAGRA) 100 MG tablet Take 1 Tab by mouth as needed for Erectile Dysfunction. 2 Tab 0   • IBUPROFEN PO Take  by mouth.       No current facility-administered medications for this encounter.       ALLERGIES:  Review of patient's allergies indicates no known allergies.    FAMILY HISTORY:    Family History   Problem Relation Age of Onset   • Cancer Father      lung cancer            SOCIAL HISTORY:     reports that he quit smoking about 32 years ago. His smoking use included Cigarettes. He has a 15 pack-year smoking history. He uses smokeless tobacco. He reports that he drinks alcohol. He reports that he does not use illicit drugs.     REVIEW OF SYSTEMS: Pertinent positives consist of significant for fatigue hot flashes and occasional pain in the right humerus the rest of the review of systems is negative and is in the electronic medical record.    PHYSICAL EXAM:    Filed Vitals:    06/30/17 1407   BP: 125/84   Pulse: 81   Temp: 37.1 °C (98.8 °F)   Weight: 93.985 kg (207 lb 3.2 oz)   SpO2: 98%   PAIN:  0-1  Location: Shoulder right        GENERAL: Well-appearing alert and oriented ×3 and apparent distress  Musculoskeletal: Right humerus no pain is elicited. No pain is elicited in the mid thoracic spine patient describes pain in the right groin which is new but no pain can be elicited in this area  HEENT:  Pupils are equal, round, and reactive to light.  Extraocular muscles   are intact. Sclerae nonicteric.  Conjunctivae pink.  Oral cavity, tongue   protrudes midline.   NECK:  Supple without  evidence of thyromegaly.  NODES:  No peripheral adenopathy of the neck, supraclavicular fossa or axillae   bilaterally.  LUNGS:  Clear to ascultation  HEART:  Regular rate and rhythm.  No murmur appreciated  ABDOMEN:  Soft. No evidence of hepatosplenomegaly.    EXTREMITIES:  Without Edema.  NEUROLOGIC:  Cranial nerves II through XII were intact. Normal stance and gait. Motor and sensory grossly within normal limits.    ECOG PERFORMANCE STATUS:  1= Restricted in physically strenuous activity, but ambulatory and able to carry out work of a light sedentary nature, e.g., light housework, office work.    LABORATORY DATA:   Lab Results   Component Value Date/Time    SODIUM 135 10/31/2007 05:35 AM    POTASSIUM 4.3 10/31/2007 05:35 AM    CHLORIDE 106 10/31/2007 05:35 AM    CO2 25 10/31/2007 05:35 AM    GLUCOSE 142* 10/31/2007 05:35 AM    BUN 10 10/31/2007 05:35 AM    CREATININE 0.9 10/31/2007 05:35 AM     Lab Results   Component Value Date/Time    ALKALINE PHOSPHATASE 76 10/25/2007 02:25 PM    AST(SGOT) 19 10/25/2007 02:25 PM    ALT(SGPT) 34 10/25/2007 02:25 PM    TOTAL BILIRUBIN 0.8 10/25/2007 02:25 PM      Lab Results   Component Value Date/Time    WBC 10.2 10/31/2007 05:35 AM    RBC 4.05* 10/31/2007 05:35 AM    HEMOGLOBIN 12.8* 10/31/2007 05:35 AM    HEMATOCRIT 35.6* 10/31/2007 05:35 AM    MCV 87.9 10/31/2007 05:35 AM    MCH 31.5 10/31/2007 05:35 AM    MCHC 35.9* 10/31/2007 05:35 AM    MPV 6.4* 10/31/2007 05:35 AM    NEUTROPHILS-POLYS 40.0* 10/25/2007 02:25 PM    LYMPHOCYTES 46.0* 10/25/2007 02:25 PM    MONOCYTES 11.0* 10/25/2007 02:25 PM    EOSINOPHILS 2.0 10/25/2007 02:25 PM    BASOPHILS 0.0 10/25/2007 02:25 PM          IMPRESSION:    A 57 y.o. with metastatic hormone resistant prostate cancer still with pain in the humerus also thought to show progression on bone scan although I think this could be from a healing fracture. Also it says it's increase in the T10 vertebral body but this is been treated so could be  consistent with treated metastasis..    RECOMMENDATIONS:   I would like to see the results of the MRI scan of the humerus and see him in follow-up after he has seen Dr. Price. He is looking to get him on Zytiga. Patient knows to give me a call if there is any change in his symptoms or any increase in bone pain    35 minutes was spent face-to-face with patient in the office and more than half of that time was spent counseling patient or coordinating care as described above.      Thank you for the opportunity to participate in his care.  If any questions or comments, please do not hesitate in calling.      Please note that this dictation was created using voice recognition software. I have made every reasonable attempt to correct obvious errors, but I expect that there are errors of grammar and possibly content that I did not discover before finalizing the note.

## 2017-07-14 ENCOUNTER — HOSPITAL ENCOUNTER (OUTPATIENT)
Dept: RADIOLOGY | Facility: MEDICAL CENTER | Age: 57
End: 2017-07-14
Attending: RADIOLOGY
Payer: COMMERCIAL

## 2017-07-14 ENCOUNTER — TELEPHONE (OUTPATIENT)
Dept: OTHER | Facility: MEDICAL CENTER | Age: 57
End: 2017-07-14

## 2017-07-14 VITALS
TEMPERATURE: 98 F | OXYGEN SATURATION: 96 % | BODY MASS INDEX: 31.1 KG/M2 | RESPIRATION RATE: 18 BRPM | DIASTOLIC BLOOD PRESSURE: 68 MMHG | SYSTOLIC BLOOD PRESSURE: 132 MMHG | HEIGHT: 69 IN | HEART RATE: 84 BPM | WEIGHT: 210 LBS

## 2017-07-14 DIAGNOSIS — C61 PROSTATE CARCINOMA, RECURRENT (HCC): ICD-10-CM

## 2017-07-14 PROCEDURE — 01922 ANES N-INVAS IMG/RADJ THER: CPT

## 2017-07-14 PROCEDURE — A9579 GAD-BASE MR CONTRAST NOS,1ML: HCPCS | Performed by: RADIOLOGY

## 2017-07-14 PROCEDURE — 73220 MRI UPPR EXTREMITY W/O&W/DYE: CPT | Mod: RT

## 2017-07-14 PROCEDURE — 700117 HCHG RX CONTRAST REV CODE 255: Performed by: RADIOLOGY

## 2017-07-14 PROCEDURE — 700101 HCHG RX REV CODE 250

## 2017-07-14 PROCEDURE — 700111 HCHG RX REV CODE 636 W/ 250 OVERRIDE (IP)

## 2017-07-14 RX ADMIN — GADODIAMIDE 20 ML: 287 INJECTION INTRAVENOUS at 13:48

## 2017-07-14 ASSESSMENT — PAIN SCALES - GENERAL
PAINLEVEL_OUTOF10: 0

## 2017-07-14 NOTE — IP AVS SNAPSHOT
" <p align=\"LEFT\"><IMG SRC=\"//EMRWB/blob$/Images/[email or web address]\" alt=\"Image\" WIDTH=\"50%\" HEIGHT=\"200\" BORDER=\"\"></p>      `           Rhys Yang   MRN: [id number]    Department:  [facility] [address]   Date of Visit:              `  Discharge Instructions       MRI ADULT DISCHARGE INSTRUCTIONS    You have been medicated today for your scan. Please follow the instructions below to ensure your safe recovery. If you have any questions or problems, feel free to call us at [phone] or [phone].     1.   Have someone stay with you to assist you as needed.    2.   Do not drive or operate any mechanical devices.    3.   Do not perform any activity that requires concentration. Make no major decisions over the next 24 hours.     4.   Be careful changing positions from laying down to sitting or standing, as you may become dizzy.     5.   Do not drink alcohol for 48 hours.    6.   There are no restrictions for eating your normal meals. Drink fluids.    7.   You may continue your usual medications for pain, tranquilizers, muscle relaxants or sedatives when awake.     8.   Tomorrow, you may continue your normal daily activities.     9.   Pressure dressing on 10 - 15 minutes. If swelling or bleeding occurs when removed, continue placing direct pressure on injection site for another 5 minutes, or until bleeding stops.     I have been informed of and understand the above discharge instructions.      `       Diet / Nutrition:    Follow any diet instructions given to you by your doctor or the dietician, including how much salt (sodium) you are allowed each day.    If you are overweight, talk to your doctor about a weight reduction plan.    Activity:    Remain physically active following your doctor's instructions about exercise and activity.    Rest often.     Any time you become even a little tired or short of breath, SIT DOWN and rest.    Worsening Symptoms:    Report any of the following signs and symptoms to the " doctor's office immediately:    *Pain of jaw, arm, or neck  *Chest pain not relieved by medication                               *Dizziness or loss of consciousness  *Difficulty breathing even when at rest   *More tired than usual                                       *Bleeding drainage or swelling of surgical site  *Swelling of feet, ankles, legs or stomach                 *Fever (>100ºF)  *Pink or blood tinged sputum  *Weight gain (3lbs/day or 5lbs /week)           *Shock from internal defibrillator (if applicable)  *Palpitations or irregular heartbeats                *Cool and/or numb extremities    Stroke Awareness    Common Risk Factors for Stroke include:    Age  Atrial Fibrillation  Carotid Artery Stenosis  Diabetes Mellitus  Excessive alcohol consumption  High blood pressure  Overweight   Physical inactivity  Smoking    Warning signs and symptoms of a stroke include:    *Sudden numbness or weakness of the face, arm or leg (especially on one side of the body).  *Sudden confusion, trouble speaking or understanding.  *Sudden trouble seeing in one or both eyes.  *Sudden trouble walking, dizziness, loss of balance or coordination.Sudden severe headache with no known cause.    It is very important to get treatment quickly when a stroke occurs. If you experience any of the above warning signs, call 911 immediately.                    `     Quit Smoking / Tobacco Use:    I understand the use of any tobacco products increases my chance of suffering from future heart disease or stroke and could cause other illnesses which may shorten my life. Quitting the use of tobacco products is the single most important thing I can do to improve my health. For further information on smoking / tobacco cessation call a Toll Free Quit Line at 1-654.944.7438 (*[employer] [place]) or 1-208.969.1600 (American Lung Association) or you can access the web based program at www.lungusa.org.    [place] Tobacco Users Help Line:  (844)  879-2543       Toll Free: 8-797-868-5161  Quit Tobacco Program [facility] (482)777-1026    Crisis Hotline:    [place] Crisis Hotline:  1-663-SVIKXOC or 1-824.237.4511    [place] Crisis Hotline:    1-803.292.1126 or 267-828-4484    Discharge Survey:   Thank you for choosing [facility]. We hope we did everything we could to make your hospital stay a pleasant one. You may be receiving a phone survey and we would appreciate your time and participation in answering the questions. Your input is very valuable to us in our efforts to improve our service to our patients and their families.        My signature on this form indicates that:    1. I have reviewed and understand the above information.  2. My questions regarding this information have been answered to my satisfaction.  3. I have formulated a plan with my discharge nurse to obtain my prescribed medications for home.                   `           Patient or Caregiver Signature:  ____________________________________________________________    Date:  ____________________________________________________________       `

## 2017-08-02 ENCOUNTER — RESOLUTE PROFESSIONAL BILLING HOSPITAL PROF FEE (OUTPATIENT)
Dept: HOSPITALIST | Facility: MEDICAL CENTER | Age: 57
End: 2017-08-02
Payer: COMMERCIAL

## 2017-08-02 ENCOUNTER — APPOINTMENT (OUTPATIENT)
Dept: RADIOLOGY | Facility: MEDICAL CENTER | Age: 57
End: 2017-08-02
Attending: EMERGENCY MEDICINE
Payer: COMMERCIAL

## 2017-08-02 ENCOUNTER — HOSPITAL ENCOUNTER (OUTPATIENT)
Facility: MEDICAL CENTER | Age: 57
End: 2017-08-03
Attending: EMERGENCY MEDICINE | Admitting: INTERNAL MEDICINE
Payer: COMMERCIAL

## 2017-08-02 DIAGNOSIS — D64.9 SEVERE ANEMIA: ICD-10-CM

## 2017-08-02 DIAGNOSIS — C79.51 PROSTATE CANCER METASTATIC TO BONE (HCC): ICD-10-CM

## 2017-08-02 DIAGNOSIS — C61 PROSTATE CANCER METASTATIC TO BONE (HCC): ICD-10-CM

## 2017-08-02 DIAGNOSIS — R53.1 WEAKNESS: ICD-10-CM

## 2017-08-02 LAB
25(OH)D3 SERPL-MCNC: 45 NG/ML (ref 30–100)
ABO GROUP BLD: NORMAL
ALBUMIN SERPL BCP-MCNC: 3.1 G/DL (ref 3.2–4.9)
ALBUMIN/GLOB SERPL: 1 G/DL
ALP SERPL-CCNC: 288 U/L (ref 30–99)
ALT SERPL-CCNC: 23 U/L (ref 2–50)
ANION GAP SERPL CALC-SCNC: 8 MMOL/L (ref 0–11.9)
ANISOCYTOSIS BLD QL SMEAR: ABNORMAL
AST SERPL-CCNC: 51 U/L (ref 12–45)
BARCODED ABORH UBTYP: 600
BARCODED PRD CODE UBPRD: NORMAL
BARCODED UNIT NUM UBUNT: NORMAL
BASOPHILS # BLD AUTO: 0 % (ref 0–1.8)
BASOPHILS # BLD: 0 K/UL (ref 0–0.12)
BILIRUB SERPL-MCNC: 0.5 MG/DL (ref 0.1–1.5)
BLD GP AB SCN SERPL QL: NORMAL
BUN SERPL-MCNC: 16 MG/DL (ref 8–22)
CALCIUM SERPL-MCNC: 8.9 MG/DL (ref 8.5–10.5)
CHLORIDE SERPL-SCNC: 103 MMOL/L (ref 96–112)
CO2 SERPL-SCNC: 22 MMOL/L (ref 20–33)
COMPONENT R 8504R: NORMAL
CREAT SERPL-MCNC: 0.61 MG/DL (ref 0.5–1.4)
DACRYOCYTES BLD QL SMEAR: NORMAL
EOSINOPHIL # BLD AUTO: 0 K/UL (ref 0–0.51)
EOSINOPHIL NFR BLD: 0 % (ref 0–6.9)
ERYTHROCYTE [DISTWIDTH] IN BLOOD BY AUTOMATED COUNT: 47.6 FL (ref 35.9–50)
FOLATE SERPL-MCNC: 17.4 NG/ML
GFR SERPL CREATININE-BSD FRML MDRD: >60 ML/MIN/1.73 M 2
GIANT PLATELETS BLD QL SMEAR: NORMAL
GLOBULIN SER CALC-MCNC: 3.2 G/DL (ref 1.9–3.5)
GLUCOSE SERPL-MCNC: 96 MG/DL (ref 65–99)
HCT VFR BLD AUTO: 20.7 % (ref 42–52)
HGB BLD-MCNC: 6.8 G/DL (ref 14–18)
IRON SATN MFR SERPL: 24 % (ref 15–55)
IRON SERPL-MCNC: 61 UG/DL (ref 50–180)
LIPASE SERPL-CCNC: 6 U/L (ref 11–82)
LYMPHOCYTES # BLD AUTO: 0.46 K/UL (ref 1–4.8)
LYMPHOCYTES NFR BLD: 10.7 % (ref 22–41)
MAGNESIUM SERPL-MCNC: 2.1 MG/DL (ref 1.5–2.5)
MANUAL DIFF BLD: NORMAL
MCH RBC QN AUTO: 27.6 PG (ref 27–33)
MCHC RBC AUTO-ENTMCNC: 32.9 G/DL (ref 33.7–35.3)
MCV RBC AUTO: 84.1 FL (ref 81.4–97.8)
MICROCYTES BLD QL SMEAR: ABNORMAL
MONOCYTES # BLD AUTO: 0.19 K/UL (ref 0–0.85)
MONOCYTES NFR BLD AUTO: 4.5 % (ref 0–13.4)
MORPHOLOGY BLD-IMP: NORMAL
MYELOCYTES NFR BLD MANUAL: 1.8 %
NEUTROPHILS # BLD AUTO: 3.57 K/UL (ref 1.82–7.42)
NEUTROPHILS NFR BLD: 79.4 % (ref 44–72)
NEUTS BAND NFR BLD MANUAL: 3.6 % (ref 0–10)
NRBC # BLD AUTO: 0.1 K/UL
NRBC BLD AUTO-RTO: 2.3 /100 WBC
OVALOCYTES BLD QL SMEAR: NORMAL
PLATELET # BLD AUTO: 182 K/UL (ref 164–446)
PLATELET BLD QL SMEAR: NORMAL
PMV BLD AUTO: 8.3 FL (ref 9–12.9)
POIKILOCYTOSIS BLD QL SMEAR: NORMAL
POLYCHROMASIA BLD QL SMEAR: NORMAL
POTASSIUM SERPL-SCNC: 4.1 MMOL/L (ref 3.6–5.5)
PRODUCT TYPE UPROD: NORMAL
PROT SERPL-MCNC: 6.3 G/DL (ref 6–8.2)
RBC # BLD AUTO: 2.46 M/UL (ref 4.7–6.1)
RBC BLD AUTO: PRESENT
RH BLD: NORMAL
SODIUM SERPL-SCNC: 133 MMOL/L (ref 135–145)
TIBC SERPL-MCNC: 256 UG/DL (ref 250–450)
TSH SERPL DL<=0.005 MIU/L-ACNC: 3.57 UIU/ML (ref 0.3–3.7)
UNIT STATUS USTAT: NORMAL
VIT B12 SERPL-MCNC: 835 PG/ML (ref 211–911)
WBC # BLD AUTO: 4.3 K/UL (ref 4.8–10.8)

## 2017-08-02 PROCEDURE — 82607 VITAMIN B-12: CPT

## 2017-08-02 PROCEDURE — A9270 NON-COVERED ITEM OR SERVICE: HCPCS | Performed by: INTERNAL MEDICINE

## 2017-08-02 PROCEDURE — 84443 ASSAY THYROID STIM HORMONE: CPT

## 2017-08-02 PROCEDURE — 96361 HYDRATE IV INFUSION ADD-ON: CPT

## 2017-08-02 PROCEDURE — P9016 RBC LEUKOCYTES REDUCED: HCPCS

## 2017-08-02 PROCEDURE — 83540 ASSAY OF IRON: CPT

## 2017-08-02 PROCEDURE — 82746 ASSAY OF FOLIC ACID SERUM: CPT

## 2017-08-02 PROCEDURE — 99285 EMERGENCY DEPT VISIT HI MDM: CPT

## 2017-08-02 PROCEDURE — 83735 ASSAY OF MAGNESIUM: CPT

## 2017-08-02 PROCEDURE — 700105 HCHG RX REV CODE 258: Performed by: INTERNAL MEDICINE

## 2017-08-02 PROCEDURE — 71010 DX-CHEST-PORTABLE (1 VIEW): CPT

## 2017-08-02 PROCEDURE — G0378 HOSPITAL OBSERVATION PER HR: HCPCS

## 2017-08-02 PROCEDURE — 80053 COMPREHEN METABOLIC PANEL: CPT

## 2017-08-02 PROCEDURE — 82306 VITAMIN D 25 HYDROXY: CPT

## 2017-08-02 PROCEDURE — 700111 HCHG RX REV CODE 636 W/ 250 OVERRIDE (IP): Performed by: INTERNAL MEDICINE

## 2017-08-02 PROCEDURE — 83550 IRON BINDING TEST: CPT

## 2017-08-02 PROCEDURE — 36430 TRANSFUSION BLD/BLD COMPNT: CPT

## 2017-08-02 PROCEDURE — 700102 HCHG RX REV CODE 250 W/ 637 OVERRIDE(OP): Performed by: INTERNAL MEDICINE

## 2017-08-02 PROCEDURE — 83690 ASSAY OF LIPASE: CPT

## 2017-08-02 PROCEDURE — 96375 TX/PRO/DX INJ NEW DRUG ADDON: CPT

## 2017-08-02 PROCEDURE — 85007 BL SMEAR W/DIFF WBC COUNT: CPT

## 2017-08-02 PROCEDURE — 86923 COMPATIBILITY TEST ELECTRIC: CPT

## 2017-08-02 PROCEDURE — 86900 BLOOD TYPING SEROLOGIC ABO: CPT

## 2017-08-02 PROCEDURE — 85027 COMPLETE CBC AUTOMATED: CPT

## 2017-08-02 PROCEDURE — 700105 HCHG RX REV CODE 258: Performed by: EMERGENCY MEDICINE

## 2017-08-02 PROCEDURE — 86850 RBC ANTIBODY SCREEN: CPT

## 2017-08-02 PROCEDURE — 96374 THER/PROPH/DIAG INJ IV PUSH: CPT

## 2017-08-02 PROCEDURE — 700111 HCHG RX REV CODE 636 W/ 250 OVERRIDE (IP): Performed by: EMERGENCY MEDICINE

## 2017-08-02 PROCEDURE — 86901 BLOOD TYPING SEROLOGIC RH(D): CPT

## 2017-08-02 PROCEDURE — 99220 PR INITIAL OBSERVATION CARE,LEVL III: CPT | Performed by: INTERNAL MEDICINE

## 2017-08-02 RX ORDER — POLYETHYLENE GLYCOL 3350 17 G/17G
1 POWDER, FOR SOLUTION ORAL
Status: DISCONTINUED | OUTPATIENT
Start: 2017-08-02 | End: 2017-08-03 | Stop reason: HOSPADM

## 2017-08-02 RX ORDER — PROMETHAZINE HYDROCHLORIDE 25 MG/1
12.5-25 TABLET ORAL EVERY 4 HOURS PRN
Status: DISCONTINUED | OUTPATIENT
Start: 2017-08-02 | End: 2017-08-03 | Stop reason: HOSPADM

## 2017-08-02 RX ORDER — ALPRAZOLAM 0.5 MG/1
0.5 TABLET ORAL
Status: DISCONTINUED | OUTPATIENT
Start: 2017-08-02 | End: 2017-08-03 | Stop reason: HOSPADM

## 2017-08-02 RX ORDER — METHYLPREDNISOLONE SODIUM SUCCINATE 125 MG/2ML
125 INJECTION, POWDER, LYOPHILIZED, FOR SOLUTION INTRAMUSCULAR; INTRAVENOUS ONCE
Status: COMPLETED | OUTPATIENT
Start: 2017-08-02 | End: 2017-08-02

## 2017-08-02 RX ORDER — PROMETHAZINE HYDROCHLORIDE 25 MG/1
12.5-25 SUPPOSITORY RECTAL EVERY 4 HOURS PRN
Status: DISCONTINUED | OUTPATIENT
Start: 2017-08-02 | End: 2017-08-03 | Stop reason: HOSPADM

## 2017-08-02 RX ORDER — BISACODYL 10 MG
10 SUPPOSITORY, RECTAL RECTAL
Status: DISCONTINUED | OUTPATIENT
Start: 2017-08-02 | End: 2017-08-03 | Stop reason: HOSPADM

## 2017-08-02 RX ORDER — PREDNISONE 10 MG/1
10 TABLET ORAL DAILY
Status: DISCONTINUED | OUTPATIENT
Start: 2017-08-03 | End: 2017-08-03 | Stop reason: HOSPADM

## 2017-08-02 RX ORDER — ACETAMINOPHEN 325 MG/1
650 TABLET ORAL EVERY 6 HOURS PRN
Status: DISCONTINUED | OUTPATIENT
Start: 2017-08-02 | End: 2017-08-03 | Stop reason: HOSPADM

## 2017-08-02 RX ORDER — DEXTROSE MONOHYDRATE 25 G/50ML
25 INJECTION, SOLUTION INTRAVENOUS
Status: DISCONTINUED | OUTPATIENT
Start: 2017-08-02 | End: 2017-08-03 | Stop reason: HOSPADM

## 2017-08-02 RX ORDER — AMOXICILLIN 250 MG
2 CAPSULE ORAL 2 TIMES DAILY
Status: DISCONTINUED | OUTPATIENT
Start: 2017-08-02 | End: 2017-08-03 | Stop reason: HOSPADM

## 2017-08-02 RX ORDER — ONDANSETRON 4 MG/1
4 TABLET, ORALLY DISINTEGRATING ORAL EVERY 4 HOURS PRN
Status: DISCONTINUED | OUTPATIENT
Start: 2017-08-02 | End: 2017-08-03 | Stop reason: HOSPADM

## 2017-08-02 RX ORDER — ONDANSETRON 2 MG/ML
4 INJECTION INTRAMUSCULAR; INTRAVENOUS EVERY 4 HOURS PRN
Status: DISCONTINUED | OUTPATIENT
Start: 2017-08-02 | End: 2017-08-03 | Stop reason: HOSPADM

## 2017-08-02 RX ORDER — ALPRAZOLAM 0.5 MG/1
0.5 TABLET ORAL
Status: ON HOLD | COMMUNITY
End: 2017-08-21

## 2017-08-02 RX ORDER — SODIUM CHLORIDE 9 MG/ML
1000 INJECTION, SOLUTION INTRAVENOUS ONCE
Status: COMPLETED | OUTPATIENT
Start: 2017-08-02 | End: 2017-08-02

## 2017-08-02 RX ORDER — SODIUM CHLORIDE 9 MG/ML
INJECTION, SOLUTION INTRAVENOUS CONTINUOUS
Status: DISCONTINUED | OUTPATIENT
Start: 2017-08-02 | End: 2017-08-03 | Stop reason: HOSPADM

## 2017-08-02 RX ADMIN — SODIUM CHLORIDE 1000 ML: 9 INJECTION, SOLUTION INTRAVENOUS at 20:28

## 2017-08-02 RX ADMIN — ONDANSETRON 4 MG: 2 INJECTION INTRAMUSCULAR; INTRAVENOUS at 22:51

## 2017-08-02 RX ADMIN — METHYLPREDNISOLONE SODIUM SUCCINATE 125 MG: 125 INJECTION, POWDER, FOR SOLUTION INTRAMUSCULAR; INTRAVENOUS at 20:28

## 2017-08-02 RX ADMIN — SODIUM CHLORIDE: 9 INJECTION, SOLUTION INTRAVENOUS at 22:52

## 2017-08-02 RX ADMIN — ALPRAZOLAM 0.5 MG: 0.5 TABLET ORAL at 22:51

## 2017-08-02 RX ADMIN — STANDARDIZED SENNA CONCENTRATE AND DOCUSATE SODIUM 2 TABLET: 8.6; 5 TABLET, FILM COATED ORAL at 22:51

## 2017-08-02 ASSESSMENT — PATIENT HEALTH QUESTIONNAIRE - PHQ9
9. THOUGHTS THAT YOU WOULD BE BETTER OFF DEAD, OR OF HURTING YOURSELF: NOT AT ALL
6. FEELING BAD ABOUT YOURSELF - OR THAT YOU ARE A FAILURE OR HAVE LET YOURSELF OR YOUR FAMILY DOWN: NOT AL ALL
2. FEELING DOWN, DEPRESSED, IRRITABLE, OR HOPELESS: SEVERAL DAYS
SUM OF ALL RESPONSES TO PHQ9 QUESTIONS 1 AND 2: 2
5. POOR APPETITE OR OVEREATING: SEVERAL DAYS
3. TROUBLE FALLING OR STAYING ASLEEP OR SLEEPING TOO MUCH: NOT AT ALL
8. MOVING OR SPEAKING SO SLOWLY THAT OTHER PEOPLE COULD HAVE NOTICED. OR THE OPPOSITE, BEING SO FIGETY OR RESTLESS THAT YOU HAVE BEEN MOVING AROUND A LOT MORE THAN USUAL: MORE THAN HALF THE DAYS
SUM OF ALL RESPONSES TO PHQ QUESTIONS 1-9: 6
7. TROUBLE CONCENTRATING ON THINGS, SUCH AS READING THE NEWSPAPER OR WATCHING TELEVISION: NOT AT ALL
4. FEELING TIRED OR HAVING LITTLE ENERGY: SEVERAL DAYS
1. LITTLE INTEREST OR PLEASURE IN DOING THINGS: SEVERAL DAYS

## 2017-08-02 ASSESSMENT — PAIN SCALES - GENERAL
PAINLEVEL_OUTOF10: 2

## 2017-08-02 ASSESSMENT — LIFESTYLE VARIABLES
EVER_SMOKED: YES
ALCOHOL_USE: NO

## 2017-08-02 NOTE — IP AVS SNAPSHOT
8/3/2017    Rhys Yang  [address]  [place] [address]    Dear Rhys:    [facility] wants to ensure your discharge home is safe and you or your loved ones have had all of your questions answered regarding your care after you leave the hospital.    Below is a list of resources and contact information should you have any questions regarding your hospital stay, follow-up instructions, or active medical symptoms.    Questions or Concerns Regarding… Contact   Medical Questions Related to Your Discharge  (7 days a week, 8am-5pm) Contact a Nurse Care Coordinator   [phone]   Medical Questions Not Related to Your Discharge  (24 hours a day / 7 days a week)  Contact the Nurse Health Line   [phone]    Medications or Discharge Instructions Refer to your discharge packet   or contact your [facility] Primary Care Provider   [phone]   Follow-up Appointment(s) Schedule your appointment via [employer]   or contact Scheduling [phone]   Billing Review your statement via [employer]  or contact Billing [phone]   Medical Records Review your records via [employer]   or contact Medical Records [phone]     You may receive a telephone call within two days of discharge. This call is to make certain you understand your discharge instructions and have the opportunity to have any questions answered. You can also easily access your medical information, test results and upcoming appointments via the [employer] free online health management tool. You can learn more and sign up at [employer]. For assistance setting up your [employer] account, please call [phone].    Once again, we want to ensure your discharge home is safe and that you have a clear understanding of any next steps in your care. If you have any questions or concerns, please do not hesitate to contact us, we are here for you. Thank you for choosing [facility] for your healthcare needs.    Sincerely,    Your [facility] Healthcare Team

## 2017-08-02 NOTE — IP AVS SNAPSHOT
" <p align=\"LEFT\"><IMG SRC=\"//EMRWB/blob$/Images/[email or web address]\" alt=\"Image\" WIDTH=\"50%\" HEIGHT=\"200\" BORDER=\"\"></p>                   Name:Rhys Yang  Medical Record Number:[id number]  CSN: [id number]    YOB: 1960   Age: 57 y.o.  Sex: male  HT:1.753 m (5' 9\") WT: 90.719 kg (200 lb)          Admit Date: 8/2/2017     Discharge Date:   Today's Date: 8/3/2017  Attending Doctor:  Jani Lovett M.D.                  Allergies:  Review of patient's allergies indicates no known allergies.          Your appointments     Aug 09, 2017  2:00 PM   Established Patient with ROSALBA Gauthier   [address] [id number] [phone]           You will be receiving a confirmation call a few days before your appointment from our automated call confirmation system.                 Medication List      Take these Medications        Instructions    alprazolam 0.5 MG Tabs   Commonly known as:  XANAX    Take 0.5 mg by mouth every bedtime.   Dose:  0.5 mg       leuprolide 22.5 MG Kit   Commonly known as:  LUPRON    22.5 mg by Intramuscular route Every 3 Months.   Dose:  22.5 mg       MULTIVITAMIN PO    Take 1 Tab by mouth every day.   Dose:  1 Tab       predniSONE 10 MG Tabs   Commonly known as:  DELTASONE    Take 10 mg by mouth every day.   Dose:  10 mg       PROBIOTIC ADVANCED PO    Take 1 Tab by mouth every day.   Dose:  1 Tab       SELENIUM PO    Take 1 Tab by mouth every day.   Dose:  1 Tab       tramadol 50 MG Tabs   Commonly known as:  ULTRAM    Take 1 Tab by mouth every 6 hours as needed for Moderate Pain or Severe Pain.   Dose:  50 mg       VITAMIN B 12 PO    Take 1 Tab by mouth every day.   Dose:  1 Tab       VITAMIN C PO    Take 1 Tab by mouth every day.   Dose:  1 Tab       VITAMIN D (CHOLECALCIFEROL) PO    Take 1 Tab by mouth every day.   Dose:  1 Tab       XTANDI 40 MG Caps   Generic drug:  Enzalutamide    Take 160 mg by mouth " every evening.   Dose:  160 mg

## 2017-08-02 NOTE — IP AVS SNAPSHOT
" Home Care Instructions                                                                                                                  Name:Rhys Yang  Medical Record Number:[id number]  CSN: [id number]    YOB: 1960   Age: 57 y.o.  Sex: male  HT:1.753 m (5' 9\") WT: 90.719 kg (200 lb)          Admit Date: 8/2/2017     Discharge Date:   Today's Date: 8/3/2017  Attending Doctor:  Jani Lovett M.D.                  Allergies:  Review of patient's allergies indicates no known allergies.            Discharge Instructions       Discharge Instructions    Discharged to home by car with relative. Discharged via wheelchair, hospital escort: Yes.  Special equipment needed: Not Applicable    Be sure to schedule a follow-up appointment with your primary care doctor or any specialists as instructed.     Discharge Plan:   Diet Plan: Discussed (Regular)  Activity Level: Discussed (As tolerated)  Confirmed Follow up Appointment: Appointment Scheduled  Confirmed Symptoms Management: Discussed  Medication Reconciliation Updated: Yes  Pneumococcal Vaccine Given - only chart on this line when given:  (Pt refusing )  Influenza Vaccine Indication: Patient Refuses    I understand that a diet low in cholesterol, fat, and sodium is recommended for good health. Unless I have been given specific instructions below for another diet, I accept this instruction as my diet prescription.   Other diet: Regular    Special Instructions:   Tramadol tablets  What is this medicine?  TRAMADOL (TRA ma dole) is a pain reliever. It is used to treat moderate to severe pain in adults.  This medicine may be used for other purposes; ask your health care provider or pharmacist if you have questions.  COMMON BRAND NAME(S): Ultram  What should I tell my health care provider before I take this medicine?  They need to know if you have any of these conditions:  -brain tumor  -depression  -drug abuse or addiction  -head injury  -if you frequently " drink alcohol containing drinks  -kidney disease or trouble passing urine  -liver disease  -lung disease, asthma, or breathing problems  -seizures or epilepsy  -suicidal thoughts, plans, or attempt; a previous suicide attempt by you or a family member  -an unusual or allergic reaction to tramadol, codeine, other medicines, foods, dyes, or preservatives  -pregnant or trying to get pregnant  -breast-feeding  How should I use this medicine?  Take this medicine by mouth with a full glass of water. Follow the directions on the prescription label. If the medicine upsets your stomach, take it with food or milk. Do not take more medicine than you are told to take.  Talk to your pediatrician regarding the use of this medicine in children. Special care may be needed.  Overdosage: If you think you have taken too much of this medicine contact a poison control center or emergency room at once.  NOTE: This medicine is only for you. Do not share this medicine with others.  What if I miss a dose?  If you miss a dose, take it as soon as you can. If it is almost time for your next dose, take only that dose. Do not take double or extra doses.  What may interact with this medicine?  Do not take this medicine with any of the following medications:  -MAOIs like Carbex, Eldepryl, Marplan, Nardil, and Parnate  This medicine may also interact with the following medications:  -alcohol or medicines that contain alcohol  -antihistamines  -benzodiazepines  -bupropion  -carbamazepine or oxcarbazepine  -clozapine  -cyclobenzaprine  -digoxin  -furazolidone  -linezolid  -medicines for depression, anxiety, or psychotic disturbances  -medicines for migraine headache like almotriptan, eletriptan, frovatriptan, naratriptan, rizatriptan, sumatriptan, zolmitriptan  -medicines for pain like pentazocine, buprenorphine, butorphanol, meperidine, nalbuphine, and propoxyphene  -medicines for sleep  -muscle  relaxants  -naltrexone  -phenobarbital  -phenothiazines like perphenazine, thioridazine, chlorpromazine, mesoridazine, fluphenazine, prochlorperazine, promazine, and trifluoperazine  -procarbazine  -warfarin  This list may not describe all possible interactions. Give your health care provider a list of all the medicines, herbs, non-prescription drugs, or dietary supplements you use. Also tell them if you smoke, drink alcohol, or use illegal drugs. Some items may interact with your medicine.  What should I watch for while using this medicine?  Tell your doctor or health care professional if your pain does not go away, if it gets worse, or if you have new or a different type of pain. You may develop tolerance to the medicine. Tolerance means that you will need a higher dose of the medicine for pain relief. Tolerance is normal and is expected if you take this medicine for a long time.  Do not suddenly stop taking your medicine because you may develop a severe reaction. Your body becomes used to the medicine. This does NOT mean you are addicted. Addiction is a behavior related to getting and using a drug for a non-medical reason. If you have pain, you have a medical reason to take pain medicine. Your doctor will tell you how much medicine to take. If your doctor wants you to stop the medicine, the dose will be slowly lowered over time to avoid any side effects.  You may get drowsy or dizzy. Do not drive, use machinery, or do anything that needs mental alertness until you know how this medicine affects you. Do not stand or sit up quickly, especially if you are an older patient. This reduces the risk of dizzy or fainting spells. Alcohol can increase or decrease the effects of this medicine. Avoid alcoholic drinks.  You may have constipation. Try to have a bowel movement at least every 2 to 3 days. If you do not have a bowel movement for 3 days, call your doctor or health care professional.  Your mouth may get dry. Chewing  sugarless gum or sucking hard candy, and drinking plenty of water may help. Contact your doctor if the problem does not go away or is severe.  What side effects may I notice from receiving this medicine?  Side effects that you should report to your doctor or health care professional as soon as possible:  -allergic reactions like skin rash, itching or hives, swelling of the face, lips, or tongue  -breathing difficulties, wheezing  -confusion  -itching  -light headedness or fainting spells  -redness, blistering, peeling or loosening of the skin, including inside the mouth  -seizures  Side effects that usually do not require medical attention (report to your doctor or health care professional if they continue or are bothersome):  -constipation  -dizziness  -drowsiness  -headache  -nausea, vomiting  This list may not describe all possible side effects. Call your doctor for medical advice about side effects. You may report side effects to FDA at 4-102-FDA-1411.  Where should I keep my medicine?  Keep out of the reach of children.  Store at room temperature between 15 and 30 degrees C (59 and 86 degrees F). Keep container tightly closed. Throw away any unused medicine after the expiration date.  NOTE: This sheet is a summary. It may not cover all possible information. If you have questions about this medicine, talk to your doctor, pharmacist, or health care provider.  © 2014, Elsevier/Gold Standard. (8/31/2011 11:55:44 AM)      Anemia, Nonspecific  Anemia is a condition in which the concentration of red blood cells or hemoglobin in the blood is below normal. Hemoglobin is a substance in red blood cells that carries oxygen to the tissues of the body. Anemia results in not enough oxygen reaching these tissues.   CAUSES   Common causes of anemia include:   · Excessive bleeding. Bleeding may be internal or external. This includes excessive bleeding from periods (in women) or from the intestine.    · Poor nutrition.     · Chronic kidney, thyroid, and liver disease.   · Bone marrow disorders that decrease red blood cell production.  · Cancer and treatments for cancer.  · HIV, AIDS, and their treatments.  · Spleen problems that increase red blood cell destruction.  · Blood disorders.  · Excess destruction of red blood cells due to infection, medicines, and autoimmune disorders.  SIGNS AND SYMPTOMS   · Minor weakness.    · Dizziness.    · Headache.  · Palpitations.    · Shortness of breath, especially with exercise.    · Paleness.  · Cold sensitivity.  · Indigestion.  · Nausea.  · Difficulty sleeping.  · Difficulty concentrating.  Symptoms may occur suddenly or they may develop slowly.   DIAGNOSIS   Additional blood tests are often needed. These help your health care provider determine the best treatment. Your health care provider will check your stool for blood and look for other causes of blood loss.   TREATMENT   Treatment varies depending on the cause of the anemia. Treatment can include:   · Supplements of iron, vitamin B12, or folic acid.    · Hormone medicines.    · A blood transfusion. This may be needed if blood loss is severe.    · Hospitalization. This may be needed if there is significant continual blood loss.    · Dietary changes.  · Spleen removal.  HOME CARE INSTRUCTIONS  Keep all follow-up appointments. It often takes many weeks to correct anemia, and having your health care provider check on your condition and your response to treatment is very important.  SEEK IMMEDIATE MEDICAL CARE IF:   · You develop extreme weakness, shortness of breath, or chest pain.    · You become dizzy or have trouble concentrating.  · You develop heavy vaginal bleeding.    · You develop a rash.    · You have bloody or black, tarry stools.    · You faint.    · You vomit up blood.    · You vomit repeatedly.    · You have abdominal pain.  · You have a fever or persistent symptoms for more than 2-3 days.    · You have a fever and your  symptoms suddenly get worse.    · You are dehydrated.    MAKE SURE YOU:  · Understand these instructions.  · Will watch your condition.  · Will get help right away if you are not doing well or get worse.     This information is not intended to replace advice given to you by your health care provider. Make sure you discuss any questions you have with your health care provider.     Document Released: 01/25/2006 Document Revised: 08/20/2014 Document Reviewed: 06/13/2014  [employer] Interactive Patient Education ©2016 [employer]      · Is patient discharged on Warfarin / Coumadin?   No     · Is patient Post Blood Transfusion?  Yes  POST BLOOD TRANSFUSION INFORMATION (ADULT)    The purpose of blood transfusion is to correct anemia, low levels of blood clotting factors or to correct acute blood loss.   Blood transfusion is very safe but occasionally unexpected adverse reactions do occur. Most adverse reactions occur during transfusion, within one to two days following transfusion or one to two weeks following transfusion. Some adverse reactions can occur one to six months after transfusion and some even years later.             If any of the symptoms listed below happen to you during your transfusion,                                 please notify your nurse immediately.   · Fever or Chills  · Flushing of the Face  · Hives, rash or itching  · Difficulty in breathing or shortness of breath  · Pain or oozing of blood from the IV needle site  · Low back pain  · Nausea or vomiting  · Weakness or fainting  · Chest pain  · Blood in the urine  · Decreased frequency of urination    The above symptoms may also occur within 24 to 48 after transfusion; if so, notify your physician.     · Yellowing of the skin    This can happen one to six months after transfusion; if so, notify your physician    Depression / Suicide Risk    As you are discharged from this [facility] facility, it is important to learn how to keep safe from harming  yourself.    Recognize the warning signs:  · Abrupt changes in personality, positive or negative- including increase in energy   · Giving away possessions  · Change in eating patterns- significant weight changes-  positive or negative  · Change in sleeping patterns- unable to sleep or sleeping all the time   · Unwillingness or inability to communicate  · Depression  · Unusual sadness, discouragement and loneliness  · Talk of wanting to die  · Neglect of personal appearance   · Rebelliousness- reckless behavior  · Withdrawal from people/activities they love  · Confusion- inability to concentrate     If you or a loved one observes any of these behaviors or has concerns about self-harm, here's what you can do:  · Talk about it- your feelings and reasons for harming yourself  · Remove any means that you might use to hurt yourself (examples: pills, rope, extension cords, firearm)  · Get professional help from the community (Mental Health, Substance Abuse, psychological counseling)  · Do not be alone:Call your Safe Contact- someone whom you trust who will be there for you.  · Call your local CRISIS HOTLINE 219-9586 or 395-496-2307  · Call your local Children's Mobile Crisis Response Team [place] (660) 290-4624 or www.Aerospike  · Call the toll free National Suicide Prevention Hotlines   · National Suicide Prevention Lifeline 835-819-YMHE (0091)  · National Hope Line Network 800-SUICIDE (089-2308)        Your appointments     Aug 09, 2017  2:00 PM   Established Patient with ROSALBA Gauthier   [facility] [place]    [address] [place] [address] [phone]           You will be receiving a confirmation call a few days before your appointment from our automated call confirmation system.                 Discharge Medication Instructions:    Below are the medications your physician expects you to take upon discharge:    Review all your home medications and  newly ordered medications with your doctor and/or pharmacist. Follow medication instructions as directed by your doctor and/or pharmacist.    Please keep your medication list with you and share with your physician.               Medication List      START taking these medications        Instructions    Morning Afternoon Evening Bedtime    tramadol 50 MG Tabs   Last time this was given:  50 mg on 8/3/2017  8:07 AM   Commonly known as:  ULTRAM        Take 1 Tab by mouth every 6 hours as needed for Moderate Pain or Severe Pain.   Dose:  50 mg                          CONTINUE taking these medications        Instructions    Morning Afternoon Evening Bedtime    alprazolam 0.5 MG Tabs   Last time this was given:  0.5 mg on 8/2/2017 10:51 PM   Commonly known as:  XANAX        Take 0.5 mg by mouth every bedtime.   Dose:  0.5 mg                        leuprolide 22.5 MG Kit   Commonly known as:  LUPRON        22.5 mg by Intramuscular route Every 3 Months.   Dose:  22.5 mg                        MULTIVITAMIN PO        Take 1 Tab by mouth every day.   Dose:  1 Tab                        predniSONE 10 MG Tabs   Last time this was given:  10 mg on 8/3/2017  8:07 AM   Commonly known as:  DELTASONE        Take 10 mg by mouth every day.   Dose:  10 mg                        PROBIOTIC ADVANCED PO        Take 1 Tab by mouth every day.   Dose:  1 Tab                        SELENIUM PO        Take 1 Tab by mouth every day.   Dose:  1 Tab                        VITAMIN B 12 PO        Take 1 Tab by mouth every day.   Dose:  1 Tab                        VITAMIN C PO        Take 1 Tab by mouth every day.   Dose:  1 Tab                        VITAMIN D (CHOLECALCIFEROL) PO        Take 1 Tab by mouth every day.   Dose:  1 Tab                        XTANDI 40 MG Caps   Generic drug:  Enzalutamide        Take 160 mg by mouth every evening.   Dose:  160 mg                             Where to Get Your Medications      Information about where to  get these medications is not yet available     ! Ask your nurse or doctor about these medications    - tramadol 50 MG Tabs            Instructions           Diet / Nutrition:    Follow any diet instructions given to you by your doctor or the dietician, including how much salt (sodium) you are allowed each day.    If you are overweight, talk to your doctor about a weight reduction plan.    Activity:    Remain physically active following your doctor's instructions about exercise and activity.    Rest often.     Any time you become even a little tired or short of breath, SIT DOWN and rest.    Worsening Symptoms:    Report any of the following signs and symptoms to the doctor's office immediately:    *Pain of jaw, arm, or neck  *Chest pain not relieved by medication                               *Dizziness or loss of consciousness  *Difficulty breathing even when at rest   *More tired than usual                                       *Bleeding drainage or swelling of surgical site  *Swelling of feet, ankles, legs or stomach                 *Fever (>100ºF)  *Pink or blood tinged sputum  *Weight gain (3lbs/day or 5lbs /week)           *Shock from internal defibrillator (if applicable)  *Palpitations or irregular heartbeats                *Cool and/or numb extremities    Stroke Awareness    Common Risk Factors for Stroke include:    Age  Atrial Fibrillation  Carotid Artery Stenosis  Diabetes Mellitus  Excessive alcohol consumption  High blood pressure  Overweight   Physical inactivity  Smoking    Warning signs and symptoms of a stroke include:    *Sudden numbness or weakness of the face, arm or leg (especially on one side of the body).  *Sudden confusion, trouble speaking or understanding.  *Sudden trouble seeing in one or both eyes.  *Sudden trouble walking, dizziness, loss of balance or coordination.Sudden severe headache with no known cause.    It is very important to get treatment quickly when a stroke occurs. If you  experience any of the above warning signs, call 911 immediately.                   Disclaimer         Quit Smoking / Tobacco Use:    I understand the use of any tobacco products increases my chance of suffering from future heart disease or stroke and could cause other illnesses which may shorten my life. Quitting the use of tobacco products is the single most important thing I can do to improve my health. For further information on smoking / tobacco cessation call a Toll Free Quit Line at 1-192.527.6561 (*National [employer]) or 1-390.745.6248 (American Lung Association) or you can access the web based program at www.lungusa.org.    [place] Tobacco Users Help Line:  (299) 158-8902       Toll Free: 1-311.575.5492  Quit Tobacco Program [facility] (074)894-9068    Crisis Hotline:    [place] Crisis Hotline:  3-930-VICTHRT or 1-199.147.6345    [place] Crisis Hotline:    1-327.790.2087 or 547-376-7878    Discharge Survey:   Thank you for choosing [facility]. We hope we did everything we could to make your hospital stay a pleasant one. You may be receiving a phone survey and we would appreciate your time and participation in answering the questions. Your input is very valuable to us in our efforts to improve our service to our patients and their families.        My signature on this form indicates that:    1. I have reviewed and understand the above information.  2. My questions regarding this information have been answered to my satisfaction.  3. I have formulated a plan with my discharge nurse to obtain my prescribed medications for home.                  Disclaimer         __________________________________                     __________       ________                       Patient Signature                                                 Date                    Time

## 2017-08-02 NOTE — IP AVS SNAPSHOT
Schrodinger Access Code: [email or web address]  Expires: 8/17/2017  4:10 AM    Your email address is not on file at [email or web address].  Email Addresses are required for you to sign up for Schrodinger, please contact [phone] to verify your personal information and to provide your email address prior to attempting to register for Schrodinger.    Rhyserica Banuelos Trey  [address] [place] [address]    Schrodinger  A secure, online tool to manage your health information     [email or web address]’s Schrodinger® is a secure, online tool that connects you to your personalized health information from the privacy of your home -- day or night - making it very easy for you to manage your healthcare. Once the activation process is completed, you can even access your medical information using the Schrodinger dominic, which is available for free in the Apple Dominic store or Google Play store.     To learn more about Schrodinger, visit [email or web address]    There are two levels of access available (as shown below):   My Chart Features  [facility] Primary Care Doctor [facility]  Specialists [facility]  Urgent  Care Non-[facility] Primary Care Doctor   Email your healthcare team securely and privately 24/7 X X X    Manage appointments: schedule your next appointment; view details of past/upcoming appointments X      Request prescription refills. X      View recent personal medical records, including lab and immunizations X X X X   View health record, including health history, allergies, medications X X X X   Read reports about your outpatient visits, procedures, consult and ER notes X X X X   See your discharge summary, which is a recap of your hospital and/or ER visit that includes your diagnosis, lab results, and care plan X X  X     How to register for Schrodinger:  Once your e-mail address has been verified, follow the following steps to sign up for Schrodinger.     1. Go to  [email or web address]  2. Click on the Sign Up Now box, which takes you to the New Member Sign Up page. You  will need to provide the following information:  a. Enter your C[employer] Access Code exactly as it appears at the top of this page. (You will not need to use this code after you’ve completed the sign-up process. If you do not sign up before the expiration date, you must request a new code.)   b. Enter your date of birth.   c. Enter your home email address.   d. Click Submit, and follow the next screen’s instructions.  3. Create a [employer] ID. This will be your CEL-SCI login ID and cannot be changed, so think of one that is secure and easy to remember.  4. Create a C[employer]I password. You can change your password at any time.  5. Enter your Password Reset Question and Answer. This can be used at a later time if you forget your password.   6. Enter your e-mail address. This allows you to receive e-mail notifications when new information is available in CEL-SCI.  7. Click Sign Up. You can now view your health information.    For assistance activating your C[employer]I account, call [phone]

## 2017-08-03 ENCOUNTER — PATIENT OUTREACH (OUTPATIENT)
Dept: HEALTH INFORMATION MANAGEMENT | Facility: OTHER | Age: 57
End: 2017-08-03

## 2017-08-03 VITALS
SYSTOLIC BLOOD PRESSURE: 145 MMHG | WEIGHT: 200 LBS | RESPIRATION RATE: 17 BRPM | BODY MASS INDEX: 29.62 KG/M2 | HEIGHT: 69 IN | HEART RATE: 72 BPM | OXYGEN SATURATION: 99 % | TEMPERATURE: 97.8 F | DIASTOLIC BLOOD PRESSURE: 72 MMHG

## 2017-08-03 PROBLEM — D64.9 ANEMIA REQUIRING TRANSFUSIONS: Status: RESOLVED | Noted: 2017-08-02 | Resolved: 2017-08-03

## 2017-08-03 PROBLEM — R53.1 WEAKNESS: Status: RESOLVED | Noted: 2017-08-02 | Resolved: 2017-08-03

## 2017-08-03 PROBLEM — C61 PROSTATE CANCER (HCC): Status: ACTIVE | Noted: 2017-08-03

## 2017-08-03 LAB
ANION GAP SERPL CALC-SCNC: 10 MMOL/L (ref 0–11.9)
ANISOCYTOSIS BLD QL SMEAR: ABNORMAL
BASOPHILS # BLD AUTO: 0 % (ref 0–1.8)
BASOPHILS # BLD: 0 K/UL (ref 0–0.12)
BUN SERPL-MCNC: 15 MG/DL (ref 8–22)
CALCIUM SERPL-MCNC: 9 MG/DL (ref 8.5–10.5)
CHLORIDE SERPL-SCNC: 106 MMOL/L (ref 96–112)
CO2 SERPL-SCNC: 20 MMOL/L (ref 20–33)
CREAT SERPL-MCNC: 0.6 MG/DL (ref 0.5–1.4)
EOSINOPHIL # BLD AUTO: 0.09 K/UL (ref 0–0.51)
EOSINOPHIL NFR BLD: 1.7 % (ref 0–6.9)
ERYTHROCYTE [DISTWIDTH] IN BLOOD BY AUTOMATED COUNT: 48.2 FL (ref 35.9–50)
EST. AVERAGE GLUCOSE BLD GHB EST-MCNC: 108 MG/DL
GFR SERPL CREATININE-BSD FRML MDRD: >60 ML/MIN/1.73 M 2
GLUCOSE SERPL-MCNC: 144 MG/DL (ref 65–99)
HBA1C MFR BLD: 5.4 % (ref 0–5.6)
HCT VFR BLD AUTO: 25.7 % (ref 42–52)
HGB BLD-MCNC: 8.5 G/DL (ref 14–18)
LYMPHOCYTES # BLD AUTO: 0.24 K/UL (ref 1–4.8)
LYMPHOCYTES NFR BLD: 4.4 % (ref 22–41)
MACROCYTES BLD QL SMEAR: ABNORMAL
MANUAL DIFF BLD: ABNORMAL
MCH RBC QN AUTO: 27.7 PG (ref 27–33)
MCHC RBC AUTO-ENTMCNC: 32.6 G/DL (ref 33.7–35.3)
MCV RBC AUTO: 85.1 FL (ref 81.4–97.8)
METAMYELOCYTES NFR BLD MANUAL: 1.7 %
MICROCYTES BLD QL SMEAR: ABNORMAL
MONOCYTES # BLD AUTO: 0.28 K/UL (ref 0–0.85)
MONOCYTES NFR BLD AUTO: 5.2 % (ref 0–13.4)
MORPHOLOGY BLD-IMP: NORMAL
NEUTROPHILS # BLD AUTO: 4.7 K/UL (ref 1.82–7.42)
NEUTROPHILS NFR BLD: 75.7 % (ref 44–72)
NEUTS BAND NFR BLD MANUAL: 11.3 % (ref 0–10)
NRBC # BLD AUTO: 0.05 K/UL
NRBC BLD AUTO-RTO: 0.9 /100 WBC
OVALOCYTES BLD QL SMEAR: NORMAL
PLATELET # BLD AUTO: 191 K/UL (ref 164–446)
PLATELET BLD QL SMEAR: NORMAL
PMV BLD AUTO: 8.8 FL (ref 9–12.9)
POIKILOCYTOSIS BLD QL SMEAR: NORMAL
POTASSIUM SERPL-SCNC: 4.7 MMOL/L (ref 3.6–5.5)
RBC # BLD AUTO: 3.03 M/UL (ref 4.7–6.1)
RBC BLD AUTO: PRESENT
SODIUM SERPL-SCNC: 136 MMOL/L (ref 135–145)
WBC # BLD AUTO: 5.4 K/UL (ref 4.8–10.8)

## 2017-08-03 PROCEDURE — 99217 PR OBSERVATION CARE DISCHARGE: CPT | Performed by: HOSPITALIST

## 2017-08-03 PROCEDURE — 85027 COMPLETE CBC AUTOMATED: CPT

## 2017-08-03 PROCEDURE — 700111 HCHG RX REV CODE 636 W/ 250 OVERRIDE (IP): Performed by: INTERNAL MEDICINE

## 2017-08-03 PROCEDURE — 700102 HCHG RX REV CODE 250 W/ 637 OVERRIDE(OP): Performed by: INTERNAL MEDICINE

## 2017-08-03 PROCEDURE — G8980 MOBILITY D/C STATUS: HCPCS | Mod: CH

## 2017-08-03 PROCEDURE — 83036 HEMOGLOBIN GLYCOSYLATED A1C: CPT

## 2017-08-03 PROCEDURE — G8979 MOBILITY GOAL STATUS: HCPCS | Mod: CH

## 2017-08-03 PROCEDURE — A9270 NON-COVERED ITEM OR SERVICE: HCPCS | Performed by: INTERNAL MEDICINE

## 2017-08-03 PROCEDURE — 700102 HCHG RX REV CODE 250 W/ 637 OVERRIDE(OP): Performed by: HOSPITALIST

## 2017-08-03 PROCEDURE — 97161 PT EVAL LOW COMPLEX 20 MIN: CPT

## 2017-08-03 PROCEDURE — G8978 MOBILITY CURRENT STATUS: HCPCS | Mod: CH

## 2017-08-03 PROCEDURE — 700105 HCHG RX REV CODE 258: Performed by: INTERNAL MEDICINE

## 2017-08-03 PROCEDURE — 80048 BASIC METABOLIC PNL TOTAL CA: CPT

## 2017-08-03 PROCEDURE — 36415 COLL VENOUS BLD VENIPUNCTURE: CPT

## 2017-08-03 PROCEDURE — G0378 HOSPITAL OBSERVATION PER HR: HCPCS

## 2017-08-03 PROCEDURE — A9270 NON-COVERED ITEM OR SERVICE: HCPCS | Performed by: HOSPITALIST

## 2017-08-03 PROCEDURE — 85007 BL SMEAR W/DIFF WBC COUNT: CPT

## 2017-08-03 RX ORDER — IBUPROFEN 600 MG/1
600 TABLET ORAL EVERY 6 HOURS PRN
Status: DISCONTINUED | OUTPATIENT
Start: 2017-08-03 | End: 2017-08-03 | Stop reason: HOSPADM

## 2017-08-03 RX ORDER — OXYCODONE HYDROCHLORIDE 5 MG/1
5 TABLET ORAL EVERY 4 HOURS PRN
Status: DISCONTINUED | OUTPATIENT
Start: 2017-08-03 | End: 2017-08-03

## 2017-08-03 RX ORDER — TRAMADOL HYDROCHLORIDE 50 MG/1
50 TABLET ORAL EVERY 6 HOURS PRN
Status: DISCONTINUED | OUTPATIENT
Start: 2017-08-03 | End: 2017-08-03 | Stop reason: HOSPADM

## 2017-08-03 RX ORDER — TRAMADOL HYDROCHLORIDE 50 MG/1
50 TABLET ORAL EVERY 6 HOURS PRN
Qty: 30 TAB | Refills: 0 | Status: SHIPPED | OUTPATIENT
Start: 2017-08-03 | End: 2017-08-06

## 2017-08-03 RX ADMIN — TRAMADOL HYDROCHLORIDE 50 MG: 50 TABLET, COATED ORAL at 08:07

## 2017-08-03 RX ADMIN — IBUPROFEN 600 MG: 600 TABLET, FILM COATED ORAL at 09:35

## 2017-08-03 RX ADMIN — SODIUM CHLORIDE: 9 INJECTION, SOLUTION INTRAVENOUS at 08:12

## 2017-08-03 RX ADMIN — ONDANSETRON 4 MG: 4 TABLET, ORALLY DISINTEGRATING ORAL at 12:11

## 2017-08-03 RX ADMIN — ACETAMINOPHEN 650 MG: 325 TABLET, FILM COATED ORAL at 00:22

## 2017-08-03 RX ADMIN — PREDNISONE 10 MG: 10 TABLET ORAL at 08:07

## 2017-08-03 ASSESSMENT — COGNITIVE AND FUNCTIONAL STATUS - GENERAL
SUGGESTED CMS G CODE MODIFIER MOBILITY: CH
MOBILITY SCORE: 24

## 2017-08-03 ASSESSMENT — ENCOUNTER SYMPTOMS
FEVER: 0
BLOOD IN STOOL: 0
LOSS OF CONSCIOUSNESS: 0
WEAKNESS: 1
DIARRHEA: 0
HEARTBURN: 0
VOMITING: 0
WHEEZING: 0
HEADACHES: 0
SPUTUM PRODUCTION: 0
WEIGHT LOSS: 0
BLURRED VISION: 0
DIZZINESS: 1
NAUSEA: 0
COUGH: 0
CHILLS: 0
ABDOMINAL PAIN: 0

## 2017-08-03 ASSESSMENT — PAIN SCALES - GENERAL
PAINLEVEL_OUTOF10: 3
PAINLEVEL_OUTOF10: 4
PAINLEVEL_OUTOF10: 6

## 2017-08-03 ASSESSMENT — GAIT ASSESSMENTS
GAIT LEVEL OF ASSIST: SUPERVISED
DISTANCE (FEET): 250

## 2017-08-03 ASSESSMENT — LIFESTYLE VARIABLES
DO YOU DRINK ALCOHOL: NO
EVER_SMOKED: YES

## 2017-08-03 NOTE — PROGRESS NOTES
Pt instructed on all discharge information, medications, prescriptions, diagnosis. No questions at discharge. Prescriptions given. All belongings sent with pt. Iv discontinued, drsg to site, wnl, cdi. Pt assisted off unit via wheelchair by staff.

## 2017-08-03 NOTE — PROGRESS NOTES
Rounding completed on pt. Report given at bedside between night shift RN and day shift RN. Assumed care of pt. Pt c/o back pain, refuses oxycodone, states makes him hallucinate. MD notified, changed to ultram. Will continue to monitor. No other needs/complaints this am, call light in reach.

## 2017-08-03 NOTE — PROGRESS NOTES
Pt arrived from ER via gurney w/ Transport and spouse at side. Received report from LICHA Villar from ER. Pt AAOx4 w/ no s/s of acute distress. Pt and spouse oriented to floor and POC. All questions met at this time. Tubing set up for blood transfusion; COD and consent verified. Bed in low and locked position. Call light w/in reach.

## 2017-08-03 NOTE — THERAPY
"Physical Therapy Evaluation completed.   Bed Mobility:  Supine to Sit: Independent  Transfers: Sit to Stand: Independent  Gait: Level Of Assist: Supervised with No Equipment Needed       Plan of Care: Patient with no further skilled PT needs in the acute care setting at this time and Patient demonstrates safety with mobility in this environment at this time.   Discharge Recommendations: Equipment: No Equipment Needed. Post-acute therapy Currently anticipate no further skilled therapy needs once patient is discharged from the inpatient setting.    See \"Rehab Therapy-Acute\" Patient Summary Report for complete documentation.   Pt presents with good functional mobility and reports being at baseline and that he is feeling even better than when he came to the hospital. Pt does not have any mobility concerns prior to discharging today and feels capable of returning home safely. Pt does not require any further skilled acute PT services at this time.   "

## 2017-08-03 NOTE — PROGRESS NOTES
Paged  at [phone] in regards to pt's uncontrolled pain. Phone call received at [phone]. Received orders for Oxycodone 5mg PO. Dr Calvo will input orders into Epic.

## 2017-08-03 NOTE — ASSESSMENT & PLAN NOTE
Likely secondary to his anemia possibly from premature tapering of prednisone  Patient will be transfused with 2 units of PRBC  Vision has been given IV steroids by the ER physician and we will continue him on his prednisone 10 mg daily which will likely require a prolonged taper  Will also check a TSH and vitamin D level

## 2017-08-03 NOTE — DIETARY
Nutrition Services: Pt seen for weight loss PTA per admit screen    Pt is a 56 yo male with dx of weakness, anemia requiring transfusions. PMHx includes prostate cancer, claustrophobia. Pt appears well nourished. Upon interview, pt states he has had poor PO intake for 2-3 weeks with ~8 lb wt loss. Pt reports nausea but no vomiting. Pt reports a UBW of 205-210 lb, pt currently weighs 200 lb (4.7% wt loss, severe wt loss). Pt states his appetite has improved since admit. Discussed high protein milkshakes, pt agreed to them. Encouraged pt to consume at least 50% of meals, pt verbalized understanding.    Pertinent Labs: Glucose 144  Pertinent Medications: Prednisone, Pericolace  Fluids: NS @ 100 ml/hr  GI: Last BM pta  Skin: Intact    Plan/Recommend: Encourage PO intake. Nutrition Rep to see patient daily for meal preferences. Please document PO intake as percentage of meals consumed. RD to monitor PO intake for adequacy.

## 2017-08-03 NOTE — PROGRESS NOTES
[facility] Fall Risk Assessment:     Last Known Fall: No falls  Mobility: Dizziness/generalized weakness  Medications: Cardiovascular or central nervous system meds  Mental Status/LOC/Awareness: Awake, alert, and oriented to date, place, and person  Toileting Needs: Use of assistive device (Bedside commode, bedpan, urinal)  Volume/Electrolyte Status: Use of IV fluids/tube feeds  Communication/Sensory: Visual (Glasses)/hearing deficit  Behavior: Appropriate behavior  [facility] Don[facility] Fall Risk Total: 9  Fall Risk Level: LOW RISK    Universal Fall Precautions:  call light/belongings in reach, bed in low position and locked, siderails up x 2, use non-slip footwear, adequate lighting, clutter free and spill free environment, educate on level of risk, educate to call for assistance    Fall Risk Level Interventions:   TRIAL (TELE 8, NEURO, MED LPOEZ 5) Low Fall Risk Interventions  Provide patient/family education based on risk assessment: completed  Educate patient/family to call staff for assistance when getting out of bed: completed  Place fall precaution signage outside patient door: completed      Patient Specific Interventions:     Medication: review medications with patient and family  Mental Status/LOC/Awareness: not applicable  Toileting: instruct patient/family on the need to call for assistance when toileting and do not leave patient unattended in bathroom/refer to toileting scripting  Volume/Electrolyte Status: ensure patient remains hydrated, administer medications as ordered for nausea and vomiting, monitor abnormal lab values and ensure IV fluids are appropriate  Communication/Sensory: update plan of care on whiteboard  Behavioral: administer medication as ordered  Mobility: ensure bed is locked and in lowest position

## 2017-08-03 NOTE — ASSESSMENT & PLAN NOTE
Symptomatically anemia causing lethargy and weakness   Hb low at 6.8, will transfuse 2 units of PRBCs  Check TSH, vitamin B12, iron studies and folate  Patient denies any melena or bleeding

## 2017-08-03 NOTE — THERAPY
OT referral received. Per nursing pt is up self, completing basic ADL without difficulty. No acute OT needs at this time. Eval deferred.

## 2017-08-03 NOTE — ED NOTES
Rhys Yang  57 y.o.  Chief Complaint   Patient presents with   • Weakness     x 2 weeks, gradually worsening   • Nausea     x 2 weeks, no vomiting, fevers, or diarrhea     Pt has a h/o prostate CA with mets to lung and bones, pt recently completed a course of prednisone this am as treatment for pneumonitis and cough. Family states that she called his oncologist office and they think that they may have tapered him off too quickly and instructed him to go back on prednisone. Pt was given fentanyl for L chronic left leg pain, as well as phenergan and zofran PTA

## 2017-08-03 NOTE — ED PROVIDER NOTES
ED Provider Note    Scribed for Samuel Jerome M.D. by Damaris Valadez. 8/2/2017  6:51 PM    Primary care provider: ROSALBA Gauthier  Means of arrival: EMS  History obtained from: Patient  History limited by: None    CHIEF COMPLAINT  Chief Complaint   Patient presents with   • Weakness     x 2 weeks, gradually worsening   • Nausea     x 2 weeks, no vomiting, fevers, or diarrhea     HPI  Rhys Yang is a 57 y.o. male with a history of metastatic prostate cancer who presents to the Emergency Department with weakness and nausea for the past two weeks which began after the patient stopped taking prednisone. He was on prednisone for months but stopped the steroids three weeks ago. He was taking 10 mg BID then tapered down to 10 mg daily for one week and then was taken off. Since the taper, he has felt weak, nauseated and with a loss of appetite. He states that a few weeks ago, he was building fences but now he is not able to walk more than to the bathroom. The patient was last hospitalized in 2007. Today the patient had cottage cheese, yogurt, bowl of cereal, and milkshake to eat. His oncologist is Dr. Price.     REVIEW OF SYSTEMS  Pertinent negatives include no fever, vomiting, diarrhea. As above, all other systems reviewed and are negative.   See HPI for further details.     PAST MEDICAL HISTORY   has a past medical history of Cancer (CMS-HCC) (2007); Breath shortness; and Claustrophobia.    SURGICAL HISTORY   has past surgical history that includes prostatectomy, radial (2007) and recovery (11/23/2015).    SOCIAL HISTORY  Social History   Substance Use Topics   • Smoking status: Former Smoker -- 1.00 packs/day for 15 years     Types: Cigarettes     Quit date: 01/01/1985   • Smokeless tobacco: Current User   • Alcohol Use: No      Comment: none x 6 months      History   Drug Use No     FAMILY HISTORY  Family History   Problem Relation Age of Onset   • Cancer Father      lung cancer     CURRENT  "MEDICATIONS  Home Medications     Reviewed by Marlyn Fischer R.N. (Registered Nurse) on 08/02/17 at 1836  Med List Status: <None>    Medication Last Dose Status    Abiraterone Acetate (ZYTIGA PO)  Active    Ascorbic Acid (VITAMIN C PO)  Active    Cyanocobalamin (VITAMIN B 12 PO)  Active    IBUPROFEN PO prn Active    leuprolide (LUPRON) 22.5 MG Kit  Active    Multiple Vitamin (MULTIVITAMIN PO)  Active    oxycodone-acetaminophen (PERCOCET-10)  MG Tab rare Active    oxycodone-acetaminophen (PERCOCET-10)  MG Tab  Active    predniSONE (DELTASONE) 10 MG Tab  Active    Probiotic Product (PROBIOTIC ADVANCED PO)  Active    SELENIUM PO  Active    sildenafil citrate (VIAGRA) 100 MG tablet prn Active    VITAMIN D, CHOLECALCIFEROL, PO  Active              ALLERGIES  No Known Allergies    PHYSICAL EXAM  VITAL SIGNS: /73 mmHg  Pulse 87  Temp(Src) 36.4 °C (97.6 °F)  Resp 12  Ht 1.753 m (5' 9\")  Wt 90.719 kg (200 lb)  BMI 29.52 kg/m2  SpO2 97%  Constitutional: Well developed, Well nourished, No acute distress.  HENT: Normocephalic, Atraumatic, Bilateral external ears normal, Oropharynx is clear mucous membranes are dry. No oral exudates or nasal discharge.   Eyes: Pupils are equal round and reactive, EOMI, Conjunctiva normal, No discharge.   Neck: Normal range of motion, No tenderness, Supple, No stridor. No meningismus.  Lymphatic: No lymphadenopathy noted.   Cardiovascular: Regular rate and rhythm without murmur rub or gallop.  Thorax & Lungs: Clear breath sounds bilaterally without wheezes, rhonchi or rales. There is no chest wall tenderness.   Abdomen: Soft non-tender non-distended. There is no rebound or guarding. No organomegaly is appreciated. Bowel sounds are normal.  Skin: Skin pallor but some rosiness to the cheeks   Back: No CVA or spinal tenderness.   Extremities: Intact distal pulses, No edema, No tenderness, No cyanosis, No clubbing. Capillary refill is less than 2 seconds.  Musculoskeletal: " Good range of motion in all major joints. No tenderness to palpation or major deformities noted.   Neurologic: Alert & oriented x 3, Normal motor function, Normal sensory function, No focal deficits noted. Reflexes are normal.  Psychiatric: Affect normal, Judgment normal, Mood normal. There is no suicidal ideation or patient reported hallucinations.      DIAGNOSTIC STUDIES / PROCEDURES    LABS  Labs Reviewed   CBC WITH DIFFERENTIAL - Abnormal; Notable for the following:     WBC 4.3 (*)     RBC 2.46 (*)     Hemoglobin 6.8 (*)     Hematocrit 20.7 (*)     MCHC 32.9 (*)     MPV 8.3 (*)     Neutrophils-Polys 79.40 (*)     Lymphocytes 10.70 (*)     Lymphs (Absolute) 0.46 (*)     All other components within normal limits   COMP METABOLIC PANEL - Abnormal; Notable for the following:     Sodium 133 (*)     AST(SGOT) 51 (*)     Alkaline Phosphatase 288 (*)     Albumin 3.1 (*)     All other components within normal limits   LIPASE - Abnormal; Notable for the following:     Lipase 6 (*)     All other components within normal limits   ESTIMATED GFR   PLATELET ESTIMATE   MORPHOLOGY   PERIPHERAL SMEAR REVIEW   DIFFERENTIAL MANUAL   COD (ADULT)   TRANSFUSE RED BLOOD CELLS-NURSING COMMUNICATION      All labs reviewed by me.    RADIOLOGY  DX-CHEST-PORTABLE (1 VIEW)   Final Result      Persistent LEFT perihilar and upper lobe airspace disease and/or scar. A component of radiation pneumonitis may be present but infection and recurrent or residual tumor not excluded.        The radiologist's interpretation of all radiological studies have been reviewed by me.    COURSE & MEDICAL DECISION MAKING  Nursing notes, VS, PMSFHx reviewed in chart.    6:51 PM Patient seen and examined at bedside. Ordered for x-ray of chest, CBC with differential, CMP and lipase labs to evaluate. Patient was treated with IV fluids x 1 L for his symptoms.      8:22 PM Patient will be given a dose of Solu-medrol  mg. as a stress dose steroids given that he  may have been aggressively tapered from prednisone although he does not have hypotension or significant electrolyte derangements to suggest that he is in adrenal crisis. Serum electrolyte are unremarkable and alk phos is elevated at 288 consistent with his bony metastasis. There is significant anemia    8:30 PM ED testing reveals that the patient is anemic with a hemoglobin of 6.8 and hematocrit at 20.7%. We will transfuse the patient. He will likely need to to 3 units during his hospital course    8:35 PM The patient was reevaluated at bedside, and I discussed with him and his wife that he was anemic and I recommend that the patient be given a blood transfusion. The patient understands risk and benefits to blood transfusion and ultimately agrees and will sign blood consent.   I (Samuel Jerome M.D.) certify that I have explained to the patient or the patient’s legal representative, the following:    (i)     Explained the Blood Transfusion procedure to be undertaken;  (ii)    Explained alternative treatments and their general nature and risks; and  (iii)   Explained the risks, and extent of risk, to the Blood Transfusion procedure.  Risks included, but are not limited to the following:  mild allergic reactions, hemolytic reaction, and possible exposure to infectious agents such as hepatitis, cytomegalovirus, infectious mononucleosis, and acquired immune deficiency syndrome (AIDS)    I have explained all of the above and have answered all patient questions arising regarding the procedure to be taken, and I believe that the patient understands what I have explained.    Date 8/2/2017  Time of Consent 8:35 PM  This form is electronically signed by Samuel Jerome      We will additionally get the patient admitted to the hospital. Patient and his wife understand need for admission and is agreeable to plan.     8:41 PM I consulted with the hospitalist, Dr. Vides, who agreed to admit the patient to the hospital.     CRITICAL  CARE   Please note a critical care time of 30 minutes, exclusive of any other billable procedures     DISPOSITION:  Patient will be admitted to the hospitalist service in critical condition.    FINAL IMPRESSION  1. Severe anemia    2. Weakness    3. Prostate cancer metastatic to bone (CMS-HCC)       Total Critical Care Time: 30 minutes as noted above     Damaris NAVA (Scribe), am scribing for, and in the presence of, Samuel Jerome M.D..    Electronically signed by: Damaris Valadez (Scribe), 8/2/2017    Samuel NAVA M.D. personally performed the services described in this documentation, as scribed by Damaris Valadez in my presence, and it is both accurate and complete.    The note accurately reflects work and decisions made by me.  Samuel Jerome  8/2/2017  9:30 PM

## 2017-08-03 NOTE — DISCHARGE INSTRUCTIONS
Discharge Instructions    Discharged to home by car with relative. Discharged via wheelchair, hospital escort: Yes.  Special equipment needed: Not Applicable    Be sure to schedule a follow-up appointment with your primary care doctor or any specialists as instructed.     Discharge Plan:   Diet Plan: Discussed (Regular)  Activity Level: Discussed (As tolerated)  Confirmed Follow up Appointment: Appointment Scheduled  Confirmed Symptoms Management: Discussed  Medication Reconciliation Updated: Yes  Pneumococcal Vaccine Given - only chart on this line when given:  (Pt refusing )  Influenza Vaccine Indication: Patient Refuses    I understand that a diet low in cholesterol, fat, and sodium is recommended for good health. Unless I have been given specific instructions below for another diet, I accept this instruction as my diet prescription.   Other diet: Regular    Special Instructions:   Tramadol tablets  What is this medicine?  TRAMADOL (TRA ma dole) is a pain reliever. It is used to treat moderate to severe pain in adults.  This medicine may be used for other purposes; ask your health care provider or pharmacist if you have questions.  COMMON BRAND NAME(S): Ultram  What should I tell my health care provider before I take this medicine?  They need to know if you have any of these conditions:  -brain tumor  -depression  -drug abuse or addiction  -head injury  -if you frequently drink alcohol containing drinks  -kidney disease or trouble passing urine  -liver disease  -lung disease, asthma, or breathing problems  -seizures or epilepsy  -suicidal thoughts, plans, or attempt; a previous suicide attempt by you or a family member  -an unusual or allergic reaction to tramadol, codeine, other medicines, foods, dyes, or preservatives  -pregnant or trying to get pregnant  -breast-feeding  How should I use this medicine?  Take this medicine by mouth with a full glass of water. Follow the directions on the prescription label. If  the medicine upsets your stomach, take it with food or milk. Do not take more medicine than you are told to take.  Talk to your pediatrician regarding the use of this medicine in children. Special care may be needed.  Overdosage: If you think you have taken too much of this medicine contact a poison control center or emergency room at once.  NOTE: This medicine is only for you. Do not share this medicine with others.  What if I miss a dose?  If you miss a dose, take it as soon as you can. If it is almost time for your next dose, take only that dose. Do not take double or extra doses.  What may interact with this medicine?  Do not take this medicine with any of the following medications:  -MAOIs like Carbex, Eldepryl, Marplan, Nardil, and Parnate  This medicine may also interact with the following medications:  -alcohol or medicines that contain alcohol  -antihistamines  -benzodiazepines  -bupropion  -carbamazepine or oxcarbazepine  -clozapine  -cyclobenzaprine  -digoxin  -furazolidone  -linezolid  -medicines for depression, anxiety, or psychotic disturbances  -medicines for migraine headache like almotriptan, eletriptan, frovatriptan, naratriptan, rizatriptan, sumatriptan, zolmitriptan  -medicines for pain like pentazocine, buprenorphine, butorphanol, meperidine, nalbuphine, and propoxyphene  -medicines for sleep  -muscle relaxants  -naltrexone  -phenobarbital  -phenothiazines like perphenazine, thioridazine, chlorpromazine, mesoridazine, fluphenazine, prochlorperazine, promazine, and trifluoperazine  -procarbazine  -warfarin  This list may not describe all possible interactions. Give your health care provider a list of all the medicines, herbs, non-prescription drugs, or dietary supplements you use. Also tell them if you smoke, drink alcohol, or use illegal drugs. Some items may interact with your medicine.  What should I watch for while using this medicine?  Tell your doctor or health care professional if your  pain does not go away, if it gets worse, or if you have new or a different type of pain. You may develop tolerance to the medicine. Tolerance means that you will need a higher dose of the medicine for pain relief. Tolerance is normal and is expected if you take this medicine for a long time.  Do not suddenly stop taking your medicine because you may develop a severe reaction. Your body becomes used to the medicine. This does NOT mean you are addicted. Addiction is a behavior related to getting and using a drug for a non-medical reason. If you have pain, you have a medical reason to take pain medicine. Your doctor will tell you how much medicine to take. If your doctor wants you to stop the medicine, the dose will be slowly lowered over time to avoid any side effects.  You may get drowsy or dizzy. Do not drive, use machinery, or do anything that needs mental alertness until you know how this medicine affects you. Do not stand or sit up quickly, especially if you are an older patient. This reduces the risk of dizzy or fainting spells. Alcohol can increase or decrease the effects of this medicine. Avoid alcoholic drinks.  You may have constipation. Try to have a bowel movement at least every 2 to 3 days. If you do not have a bowel movement for 3 days, call your doctor or health care professional.  Your mouth may get dry. Chewing sugarless gum or sucking hard candy, and drinking plenty of water may help. Contact your doctor if the problem does not go away or is severe.  What side effects may I notice from receiving this medicine?  Side effects that you should report to your doctor or health care professional as soon as possible:  -allergic reactions like skin rash, itching or hives, swelling of the face, lips, or tongue  -breathing difficulties, wheezing  -confusion  -itching  -light headedness or fainting spells  -redness, blistering, peeling or loosening of the skin, including inside the mouth  -seizures  Side effects  that usually do not require medical attention (report to your doctor or health care professional if they continue or are bothersome):  -constipation  -dizziness  -drowsiness  -headache  -nausea, vomiting  This list may not describe all possible side effects. Call your doctor for medical advice about side effects. You may report side effects to FDA at 2-959-FDA-4624.  Where should I keep my medicine?  Keep out of the reach of children.  Store at room temperature between 15 and 30 degrees C (59 and 86 degrees F). Keep container tightly closed. Throw away any unused medicine after the expiration date.  NOTE: This sheet is a summary. It may not cover all possible information. If you have questions about this medicine, talk to your doctor, pharmacist, or health care provider.  © 2014, [employer]. (8/31/2011 11:55:44 AM)      Anemia, Nonspecific  Anemia is a condition in which the concentration of red blood cells or hemoglobin in the blood is below normal. Hemoglobin is a substance in red blood cells that carries oxygen to the tissues of the body. Anemia results in not enough oxygen reaching these tissues.   CAUSES   Common causes of anemia include:   · Excessive bleeding. Bleeding may be internal or external. This includes excessive bleeding from periods (in women) or from the intestine.    · Poor nutrition.    · Chronic kidney, thyroid, and liver disease.   · Bone marrow disorders that decrease red blood cell production.  · Cancer and treatments for cancer.  · HIV, AIDS, and their treatments.  · Spleen problems that increase red blood cell destruction.  · Blood disorders.  · Excess destruction of red blood cells due to infection, medicines, and autoimmune disorders.  SIGNS AND SYMPTOMS   · Minor weakness.    · Dizziness.    · Headache.  · Palpitations.    · Shortness of breath, especially with exercise.    · Paleness.  · Cold sensitivity.  · Indigestion.  · Nausea.  · Difficulty sleeping.  · Difficulty  concentrating.  Symptoms may occur suddenly or they may develop slowly.   DIAGNOSIS   Additional blood tests are often needed. These help your health care provider determine the best treatment. Your health care provider will check your stool for blood and look for other causes of blood loss.   TREATMENT   Treatment varies depending on the cause of the anemia. Treatment can include:   · Supplements of iron, vitamin B12, or folic acid.    · Hormone medicines.    · A blood transfusion. This may be needed if blood loss is severe.    · Hospitalization. This may be needed if there is significant continual blood loss.    · Dietary changes.  · Spleen removal.  HOME CARE INSTRUCTIONS  Keep all follow-up appointments. It often takes many weeks to correct anemia, and having your health care provider check on your condition and your response to treatment is very important.  SEEK IMMEDIATE MEDICAL CARE IF:   · You develop extreme weakness, shortness of breath, or chest pain.    · You become dizzy or have trouble concentrating.  · You develop heavy vaginal bleeding.    · You develop a rash.    · You have bloody or black, tarry stools.    · You faint.    · You vomit up blood.    · You vomit repeatedly.    · You have abdominal pain.  · You have a fever or persistent symptoms for more than 2-3 days.    · You have a fever and your symptoms suddenly get worse.    · You are dehydrated.    MAKE SURE YOU:  · Understand these instructions.  · Will watch your condition.  · Will get help right away if you are not doing well or get worse.     This information is not intended to replace advice given to you by your health care provider. Make sure you discuss any questions you have with your health care provider.     Document Released: 01/25/2006 Document Revised: 08/20/2014 Document Reviewed: 06/13/2014  [employer] Interactive Patient Education ©2016 [employer]      · Is patient discharged on Warfarin / Coumadin?   No     · Is patient Post  Blood Transfusion?  Yes  POST BLOOD TRANSFUSION INFORMATION (ADULT)    The purpose of blood transfusion is to correct anemia, low levels of blood clotting factors or to correct acute blood loss.   Blood transfusion is very safe but occasionally unexpected adverse reactions do occur. Most adverse reactions occur during transfusion, within one to two days following transfusion or one to two weeks following transfusion. Some adverse reactions can occur one to six months after transfusion and some even years later.             If any of the symptoms listed below happen to you during your transfusion,                                 please notify your nurse immediately.   · Fever or Chills  · Flushing of the Face  · Hives, rash or itching  · Difficulty in breathing or shortness of breath  · Pain or oozing of blood from the IV needle site  · Low back pain  · Nausea or vomiting  · Weakness or fainting  · Chest pain  · Blood in the urine  · Decreased frequency of urination    The above symptoms may also occur within 24 to 48 after transfusion; if so, notify your physician.     · Yellowing of the skin    This can happen one to six months after transfusion; if so, notify your physician    Depression / Suicide Risk    As you are discharged from this [facility] facility, it is important to learn how to keep safe from harming yourself.    Recognize the warning signs:  · Abrupt changes in personality, positive or negative- including increase in energy   · Giving away possessions  · Change in eating patterns- significant weight changes-  positive or negative  · Change in sleeping patterns- unable to sleep or sleeping all the time   · Unwillingness or inability to communicate  · Depression  · Unusual sadness, discouragement and loneliness  · Talk of wanting to die  · Neglect of personal appearance   · Rebelliousness- reckless behavior  · Withdrawal from people/activities they love  · Confusion- inability to concentrate     If  you or a loved one observes any of these behaviors or has concerns about self-harm, here's what you can do:  · Talk about it- your feelings and reasons for harming yourself  · Remove any means that you might use to hurt yourself (examples: pills, rope, extension cords, firearm)  · Get professional help from the community (Mental Health, Substance Abuse, psychological counseling)  · Do not be alone:Call your Safe Contact- someone whom you trust who will be there for you.  · Call your local CRISIS HOTLINE 325-8419 or 692-269-2845  · Call your local Children's Mobile Crisis Response Team [place] (812) 634-8917 or www.[employer]  · Call the toll free National Suicide Prevention Hotlines   · National Suicide Prevention Lifeline 245-943-QVVT (3169)  · National Hope Line Network 800-SUICIDE (544-9317)

## 2017-08-03 NOTE — PROGRESS NOTES
[facility] Fall Risk Assessment:     Last Known Fall: No falls  Mobility: Dizziness/generalized weakness (fatigue)  Medications: Cardiovascular or central nervous system meds  Mental Status/LOC/Awareness: Awake, alert, and oriented to date, place, and person  Toileting Needs: No needs  Volume/Electrolyte Status: Use of IV fluids/tube feeds  Communication/Sensory: Visual (Glasses)/hearing deficit  Behavior: Appropriate behavior  [facility] Fall Risk Total: 7  Fall Risk Level: LOW RISK    Universal Fall Precautions:  call light/belongings in reach, bed in low position and locked, siderails up x 2, use non-slip footwear, adequate lighting, educate on level of risk, educate to call for assistance, clutter free and spill free environment    Fall Risk Level Interventions:   TRIAL (TELE 8, NEURO, MED LOPEZ 5) Low Fall Risk Interventions  Place yellow fall risk ID band on patient: verified  Provide patient/family education based on risk assessment: completed  Educate patient/family to call staff for assistance when getting out of bed: completed  Place fall precaution signage outside patient door: verified      Patient Specific Interventions:     Medication: review medications with patient and family, assess for medications that can be discontinued or dosage decreased and limit combination of prn medications  Mental Status/LOC/Awareness: check on patient hourly, reinforce the use of call light and provide activity  Toileting: monitor intake and output/use of appropriate interventions, instruct male patients prone to dizziness to void while sitting, instruct patient/family on the need to call for assistance when toileting and toilet prior to giving pain medications  Volume/Electrolyte Status: ensure patient remains hydrated, advance diet as tolerated, administer medications as ordered for nausea and vomiting, monitor abnormal lab values and ensure IV fluids are appropriate  Communication/Sensory: update plan of care on  whiteboard, ensure proper positioning when transferrng/ambulating and ensure patient has glasses/contacts and hearing aids/dentures  Behavioral: engage patient in daily activities, administer medication as ordered and instruct/reinforce fall program rationale  Mobility: schedule physical activity throughout the day, provide comfort measures during transport, dangle prior to standing, ensure bed is locked and in lowest position and provide appropriate assistive device

## 2017-08-03 NOTE — H&P
Hospital Medicine History and Physical    Date of Service  8/2/2017    Chief Complaint  Chief Complaint   Patient presents with   • Weakness     x 2 weeks, gradually worsening   • Nausea     x 2 weeks, no vomiting, fevers, or diarrhea       History of Presenting Illness  57 y.o. male with past medical history of a static prostate cancer who presented 8/2/2017 with worsening weakness and lethargy for the past 2 weeks. She has also been complaining of nausea and increased appetite. She states that he gets short of breath on walking less than 100 feet which is unusual for him. Patient was on prednisone 20 mg daily for more than 6 months over was told to taper off 3 weeks ago by taking prednisone 10 mg daily for one week and then stopping. Since then he says he has felt symptoms worsening. He denies any fever, chest pain, shortness of breath, cough, headache, weight loss, abdominal pain, diarrhea or dysuria. ER his hemoglobin was found to be low at 6.8. Denies any hematemesis or any bleeding.    Primary Care Physician  MANUEL Gauthier.    Consultants  None    Code Status  Full Code    Review of Systems  Review of Systems   Constitutional: Positive for malaise/fatigue. Negative for fever, chills and weight loss.   Eyes: Negative for blurred vision.   Respiratory: Negative for cough, sputum production and wheezing.    Cardiovascular: Negative for chest pain and leg swelling.   Gastrointestinal: Negative for heartburn, nausea, vomiting, abdominal pain, diarrhea, blood in stool and melena.   Genitourinary: Negative for dysuria and hematuria.   Skin: Negative for rash.   Neurological: Positive for dizziness and weakness. Negative for loss of consciousness and headaches.   All other systems reviewed and are negative.         Past Medical History  Past Medical History   Diagnosis Date   • Cancer (CMS-HCC) 2007     prostate   • Breath shortness    • Claustrophobia        Surgical History  Past Surgical History    Procedure Laterality Date   • Prostatectomy, radial     • Recovery  2015     Procedure: CT-CT GUIDED LEFT LUNG BIOPSY**Per **;  Surgeon: [facility];  Location: SURGERY PRE-POST PROC UNIT [facility] [place];  Service:        Medications  No current facility-administered medications on file prior to encounter.     Current Outpatient Prescriptions on File Prior to Encounter   Medication Sig Dispense Refill   • predniSONE (DELTASONE) 10 MG Tab Take 10 mg by mouth every day.     • Ascorbic Acid (VITAMIN C PO) Take 1 Tab by mouth every day.     • VITAMIN D, CHOLECALCIFEROL, PO Take 1 Tab by mouth every day.     • Cyanocobalamin (VITAMIN B 12 PO) Take 1 Tab by mouth every day.     • SELENIUM PO Take 1 Tab by mouth every day.     • Probiotic Product (PROBIOTIC ADVANCED PO) Take 1 Tab by mouth every day.     • leuprolide (LUPRON) 22.5 MG Kit 22.5 mg by Intramuscular route Every 3 Months.     • Multiple Vitamin (MULTIVITAMIN PO) Take 1 Tab by mouth every day.         Family History  Family History   Problem Relation Age of Onset   • Cancer Father      lung cancer       Social History  Social History   Substance Use Topics   • Smoking status: Former Smoker -- 1.00 packs/day for 15 years     Types: Cigarettes     Quit date: 1985   • Smokeless tobacco: Current User   • Alcohol Use: No      Comment: none x 6 months       Allergies  No Known Allergies     Physical Exam  Laboratory   Hemodynamics  Temp (24hrs), Av.4 °C (97.6 °F), Min:36.4 °C (97.6 °F), Max:36.4 °C (97.6 °F)   Temperature: 36.4 °C (97.6 °F)  Pulse  Av.6  Min: 84  Max: 95 Heart Rate (Monitored): 84  Blood Pressure: 149/73 mmHg, NIBP: 132/68 mmHg      Respiratory      Respiration: (!) 27, Pulse Oximetry: 96 %             Physical Exam   Constitutional: He is oriented to person, place, and time. He appears well-developed and well-nourished. No distress.   HENT:   Head: Normocephalic and atraumatic.   Mouth/Throat: Oropharynx is  clear and moist.   Eyes:   Conjunctival pallor   Neck: Neck supple.   Cardiovascular: Normal rate and regular rhythm.    Pulmonary/Chest: Effort normal and breath sounds normal. No respiratory distress. He has no wheezes. He has no rales.   Abdominal: Soft. Bowel sounds are normal. He exhibits no distension. There is no tenderness.   Musculoskeletal: Normal range of motion. He exhibits no edema.   Neurological: He is alert and oriented to person, place, and time. No cranial nerve deficit. Coordination normal.   Skin: Skin is warm and dry.   Psychiatric: He has a normal mood and affect. His behavior is normal.   Nursing note and vitals reviewed.      Recent Labs      08/02/17   1844   WBC  4.3*   RBC  2.46*   HEMOGLOBIN  6.8*   HEMATOCRIT  20.7*   MCV  84.1   MCH  27.6   MCHC  32.9*   RDW  47.6   PLATELETCT  182   MPV  8.3*     Recent Labs      08/02/17   1844   SODIUM  133*   POTASSIUM  4.1   CHLORIDE  103   CO2  22   GLUCOSE  96   BUN  16   CREATININE  0.61   CALCIUM  8.9     Recent Labs      08/02/17   1844   ALTSGPT  23   ASTSGOT  51*   ALKPHOSPHAT  288*   TBILIRUBIN  0.5   LIPASE  6*   GLUCOSE  96                 No results found for: TROPONINI    Imaging  DX-CHEST-PORTABLE (1 VIEW)   Final Result      Persistent LEFT perihilar and upper lobe airspace disease and/or scar. A component of radiation pneumonitis may be present but infection and recurrent or residual tumor not excluded.           Assessment/Plan     I anticipate this patient will require at least two midnights for appropriate medical management, necessitating inpatient admission.    Weakness (present on admission)  Assessment & Plan  Likely secondary to his anemia possibly from premature tapering of prednisone  Patient will be transfused with 2 units of PRBC  Vision has been given IV steroids by the ER physician and we will continue him on his prednisone 10 mg daily which will likely require a prolonged taper  Will also check a TSH and vitamin D  level    Anemia requiring transfusions  Assessment & Plan  Symptomatically anemia causing lethargy and weakness   Hb low at 6.8, will transfuse 2 units of PRBCs  Check TSH, vitamin B12, iron studies and folate  Patient denies any melena or bleeding    Prostate cancer (CMS-HCC)  Assessment & Plan  Follow-up with Dr. Price in clinic        VTE prophylaxis: SCD.

## 2017-08-04 NOTE — DISCHARGE SUMMARY
CHIEF COMPLAINT ON ADMISSION  Chief Complaint   Patient presents with   • Weakness     x 2 weeks, gradually worsening   • Nausea     x 2 weeks, no vomiting, fevers, or diarrhea       CODE STATUS  Prior    HPI & HOSPITAL COURSE  57 y.o. male with past medical history of a static prostate cancer who presented 8/2/2017 with worsening weakness and lethargy for the past 2 weeks. She has also been complaining of nausea and increased appetite. She states that he gets short of breath on walking less than 100 feet which is unusual for him. Patient was on prednisone 20 mg daily for more than 6 months over was told to taper off 3 weeks ago by taking prednisone 10 mg daily for one week and then stopping. Since then he says he has felt symptoms worsening. He denies any fever, chest pain, shortness of breath, cough, headache, weight loss, abdominal pain, diarrhea or dysuria. ER his hemoglobin was found to be low at 6.8. Denies any hematemesis or any bleeding.       He was transfused one unit of blood.     He was hydrated.     He was given ultram for his chronic pain     Therefore, he is discharged in fair and stable condition with close outpatient follow-up.    SPECIFIC OUTPATIENT FOLLOW-UP  pcp 1 week    DISCHARGE PROBLEM LIST  Active Problems:    Prostate cancer (CMS-HCC) POA: Unknown  Resolved Problems:    Weakness POA: Yes    Anemia requiring transfusions POA: Unknown      FOLLOW UP  Future Appointments  Date Time Provider Department Center   8/9/2017 2:00 PM ROSALBA Gauthier [facility] [place]     ROSALBA Gauthier  [address]  [address]  [facility] [address]  [phone]            MEDICATIONS ON DISCHARGE   Rhys Yang   Home Medication Instructions TOBY:[id number]    Printed on:08/04/17 0859   Medication Information                      alprazolam (XANAX) 0.5 MG Tab  Take 0.5 mg by mouth every bedtime.             Ascorbic Acid (VITAMIN C PO)  Take 1 Tab by mouth every day.              Cyanocobalamin (VITAMIN B 12 PO)  Take 1 Tab by mouth every day.             Enzalutamide (XTANDI) 40 MG Cap  Take 160 mg by mouth every evening.             leuprolide (LUPRON) 22.5 MG Kit  22.5 mg by Intramuscular route Every 3 Months.             Multiple Vitamin (MULTIVITAMIN PO)  Take 1 Tab by mouth every day.             predniSONE (DELTASONE) 10 MG Tab  Take 10 mg by mouth every day.             Probiotic Product (PROBIOTIC ADVANCED PO)  Take 1 Tab by mouth every day.             SELENIUM PO  Take 1 Tab by mouth every day.             tramadol (ULTRAM) 50 MG Tab  Take 1 Tab by mouth every 6 hours as needed for Moderate Pain or Severe Pain.             VITAMIN D, CHOLECALCIFEROL, PO  Take 1 Tab by mouth every day.                 DIET  No orders of the defined types were placed in this encounter.       ACTIVITY  As tolerated.  Weight bearing as tolerated      CONSULTATIONS  none    PROCEDURES  none    LABORATORY  Lab Results   Component Value Date/Time    SODIUM 136 08/03/2017 05:03 AM    POTASSIUM 4.7 08/03/2017 05:03 AM    CHLORIDE 106 08/03/2017 05:03 AM    CO2 20 08/03/2017 05:03 AM    GLUCOSE 144* 08/03/2017 05:03 AM    BUN 15 08/03/2017 05:03 AM    CREATININE 0.60 08/03/2017 05:03 AM        Lab Results   Component Value Date/Time    WBC 5.4 08/03/2017 05:03 AM    HEMOGLOBIN 8.5* 08/03/2017 05:03 AM    HEMATOCRIT 25.7* 08/03/2017 05:03 AM    PLATELET COUNT 191 08/03/2017 05:03 AM        Total time of the discharge process exceeds 38 minutes

## 2017-08-06 ENCOUNTER — RESOLUTE PROFESSIONAL BILLING HOSPITAL PROF FEE (OUTPATIENT)
Dept: HOSPITALIST | Facility: MEDICAL CENTER | Age: 57
End: 2017-08-06
Payer: COMMERCIAL

## 2017-08-06 ENCOUNTER — HOSPITAL ENCOUNTER (INPATIENT)
Facility: MEDICAL CENTER | Age: 57
LOS: 5 days | DRG: 723 | End: 2017-08-12
Attending: EMERGENCY MEDICINE | Admitting: HOSPITALIST
Payer: COMMERCIAL

## 2017-08-06 ENCOUNTER — APPOINTMENT (OUTPATIENT)
Dept: RADIOLOGY | Facility: MEDICAL CENTER | Age: 57
DRG: 723 | End: 2017-08-06
Attending: EMERGENCY MEDICINE
Payer: COMMERCIAL

## 2017-08-06 DIAGNOSIS — C79.51 PROSTATE CANCER METASTATIC TO BONE (HCC): ICD-10-CM

## 2017-08-06 DIAGNOSIS — C61 PROSTATE CANCER METASTATIC TO MULTIPLE SITES (HCC): ICD-10-CM

## 2017-08-06 DIAGNOSIS — C61 PROSTATE CANCER METASTATIC TO BONE (HCC): ICD-10-CM

## 2017-08-06 DIAGNOSIS — E87.1 HYPONATREMIA: ICD-10-CM

## 2017-08-06 DIAGNOSIS — R53.1 WEAKNESS: ICD-10-CM

## 2017-08-06 DIAGNOSIS — M54.40 ACUTE LEFT-SIDED LOW BACK PAIN WITH SCIATICA, SCIATICA LATERALITY UNSPECIFIED: ICD-10-CM

## 2017-08-06 DIAGNOSIS — R53.1 GENERALIZED WEAKNESS: ICD-10-CM

## 2017-08-06 DIAGNOSIS — D64.9 ANEMIA, UNSPECIFIED TYPE: ICD-10-CM

## 2017-08-06 DIAGNOSIS — Z78.9 FULL CODE STATUS: ICD-10-CM

## 2017-08-06 PROBLEM — R11.0 NAUSEA: Status: ACTIVE | Noted: 2017-08-06

## 2017-08-06 LAB
ALBUMIN SERPL BCP-MCNC: 3.3 G/DL (ref 3.2–4.9)
ALBUMIN/GLOB SERPL: 0.9 G/DL
ALP SERPL-CCNC: 410 U/L (ref 30–99)
ALT SERPL-CCNC: 44 U/L (ref 2–50)
ANION GAP SERPL CALC-SCNC: 8 MMOL/L (ref 0–11.9)
ANISOCYTOSIS BLD QL SMEAR: ABNORMAL
APPEARANCE UR: CLEAR
AST SERPL-CCNC: 94 U/L (ref 12–45)
BACTERIA #/AREA URNS HPF: NEGATIVE /HPF
BASOPHILS # BLD AUTO: 0.9 % (ref 0–1.8)
BASOPHILS # BLD: 0.04 K/UL (ref 0–0.12)
BILIRUB SERPL-MCNC: 0.7 MG/DL (ref 0.1–1.5)
BILIRUB UR QL STRIP.AUTO: NEGATIVE
BNP SERPL-MCNC: 8 PG/ML (ref 0–100)
BUN SERPL-MCNC: 19 MG/DL (ref 8–22)
CALCIUM SERPL-MCNC: 8.9 MG/DL (ref 8.5–10.5)
CHLORIDE SERPL-SCNC: 99 MMOL/L (ref 96–112)
CO2 SERPL-SCNC: 23 MMOL/L (ref 20–33)
COLOR UR: YELLOW
CREAT SERPL-MCNC: 0.63 MG/DL (ref 0.5–1.4)
DACRYOCYTES BLD QL SMEAR: NORMAL
EOSINOPHIL # BLD AUTO: 0.08 K/UL (ref 0–0.51)
EOSINOPHIL NFR BLD: 1.7 % (ref 0–6.9)
EPI CELLS #/AREA URNS HPF: ABNORMAL /HPF
ERYTHROCYTE [DISTWIDTH] IN BLOOD BY AUTOMATED COUNT: 50.4 FL (ref 35.9–50)
GFR SERPL CREATININE-BSD FRML MDRD: >60 ML/MIN/1.73 M 2
GLOBULIN SER CALC-MCNC: 3.5 G/DL (ref 1.9–3.5)
GLUCOSE SERPL-MCNC: 98 MG/DL (ref 65–99)
GLUCOSE UR STRIP.AUTO-MCNC: NEGATIVE MG/DL
HCT VFR BLD AUTO: 25.8 % (ref 42–52)
HGB BLD-MCNC: 8.5 G/DL (ref 14–18)
HYALINE CASTS #/AREA URNS LPF: ABNORMAL /LPF
INR PPP: 1.09 (ref 0.87–1.13)
KETONES UR STRIP.AUTO-MCNC: ABNORMAL MG/DL
LEUKOCYTE ESTERASE UR QL STRIP.AUTO: NEGATIVE
LYMPHOCYTES # BLD AUTO: 0.64 K/UL (ref 1–4.8)
LYMPHOCYTES NFR BLD: 13.3 % (ref 22–41)
MANUAL DIFF BLD: NORMAL
MCH RBC QN AUTO: 27.9 PG (ref 27–33)
MCHC RBC AUTO-ENTMCNC: 32.9 G/DL (ref 33.7–35.3)
MCV RBC AUTO: 84.6 FL (ref 81.4–97.8)
MICRO URNS: ABNORMAL
MICROCYTES BLD QL SMEAR: ABNORMAL
MONOCYTES # BLD AUTO: 0.34 K/UL (ref 0–0.85)
MONOCYTES NFR BLD AUTO: 7.1 % (ref 0–13.4)
MORPHOLOGY BLD-IMP: NORMAL
NEUTROPHILS # BLD AUTO: 3.7 K/UL (ref 1.82–7.42)
NEUTROPHILS NFR BLD: 76.1 % (ref 44–72)
NEUTS BAND NFR BLD MANUAL: 0.9 % (ref 0–10)
NITRITE UR QL STRIP.AUTO: NEGATIVE
NRBC # BLD AUTO: 0.08 K/UL
NRBC BLD AUTO-RTO: 1.7 /100 WBC
PH UR STRIP.AUTO: 6 [PH]
PLATELET # BLD AUTO: 144 K/UL (ref 164–446)
PLATELET BLD QL SMEAR: NORMAL
PMV BLD AUTO: 7.8 FL (ref 9–12.9)
POIKILOCYTOSIS BLD QL SMEAR: NORMAL
POLYCHROMASIA BLD QL SMEAR: NORMAL
POTASSIUM SERPL-SCNC: 4.3 MMOL/L (ref 3.6–5.5)
PROT SERPL-MCNC: 6.8 G/DL (ref 6–8.2)
PROT UR QL STRIP: 30 MG/DL
PROTHROMBIN TIME: 14.4 SEC (ref 12–14.6)
RBC # BLD AUTO: 3.05 M/UL (ref 4.7–6.1)
RBC # URNS HPF: ABNORMAL /HPF
RBC BLD AUTO: PRESENT
RBC UR QL AUTO: NEGATIVE
SODIUM SERPL-SCNC: 130 MMOL/L (ref 135–145)
SP GR UR STRIP.AUTO: 1.02
T4 FREE SERPL-MCNC: 1.08 NG/DL (ref 0.53–1.43)
TROPONIN I SERPL-MCNC: <0.01 NG/ML (ref 0–0.04)
TSH SERPL DL<=0.005 MIU/L-ACNC: 2.93 UIU/ML (ref 0.3–3.7)
WBC # BLD AUTO: 4.8 K/UL (ref 4.8–10.8)
WBC #/AREA URNS HPF: ABNORMAL /HPF

## 2017-08-06 PROCEDURE — 700105 HCHG RX REV CODE 258: Performed by: EMERGENCY MEDICINE

## 2017-08-06 PROCEDURE — 84484 ASSAY OF TROPONIN QUANT: CPT

## 2017-08-06 PROCEDURE — 84439 ASSAY OF FREE THYROXINE: CPT

## 2017-08-06 PROCEDURE — 85610 PROTHROMBIN TIME: CPT

## 2017-08-06 PROCEDURE — 700105 HCHG RX REV CODE 258: Performed by: INTERNAL MEDICINE

## 2017-08-06 PROCEDURE — 81001 URINALYSIS AUTO W/SCOPE: CPT

## 2017-08-06 PROCEDURE — 85027 COMPLETE CBC AUTOMATED: CPT

## 2017-08-06 PROCEDURE — 96361 HYDRATE IV INFUSION ADD-ON: CPT

## 2017-08-06 PROCEDURE — 36415 COLL VENOUS BLD VENIPUNCTURE: CPT

## 2017-08-06 PROCEDURE — 99220 PR INITIAL OBSERVATION CARE,LEVL III: CPT | Mod: 25 | Performed by: INTERNAL MEDICINE

## 2017-08-06 PROCEDURE — 99497 ADVNCD CARE PLAN 30 MIN: CPT | Performed by: INTERNAL MEDICINE

## 2017-08-06 PROCEDURE — 700111 HCHG RX REV CODE 636 W/ 250 OVERRIDE (IP): Performed by: INTERNAL MEDICINE

## 2017-08-06 PROCEDURE — G0378 HOSPITAL OBSERVATION PER HR: HCPCS

## 2017-08-06 PROCEDURE — 85007 BL SMEAR W/DIFF WBC COUNT: CPT

## 2017-08-06 PROCEDURE — 96374 THER/PROPH/DIAG INJ IV PUSH: CPT

## 2017-08-06 PROCEDURE — 71010 DX-CHEST-PORTABLE (1 VIEW): CPT

## 2017-08-06 PROCEDURE — 84443 ASSAY THYROID STIM HORMONE: CPT

## 2017-08-06 PROCEDURE — 99285 EMERGENCY DEPT VISIT HI MDM: CPT

## 2017-08-06 PROCEDURE — 700102 HCHG RX REV CODE 250 W/ 637 OVERRIDE(OP): Performed by: INTERNAL MEDICINE

## 2017-08-06 PROCEDURE — A9270 NON-COVERED ITEM OR SERVICE: HCPCS | Performed by: INTERNAL MEDICINE

## 2017-08-06 PROCEDURE — 700111 HCHG RX REV CODE 636 W/ 250 OVERRIDE (IP): Performed by: EMERGENCY MEDICINE

## 2017-08-06 PROCEDURE — 80053 COMPREHEN METABOLIC PANEL: CPT

## 2017-08-06 PROCEDURE — 83880 ASSAY OF NATRIURETIC PEPTIDE: CPT

## 2017-08-06 RX ORDER — ONDANSETRON 4 MG/1
4 TABLET, ORALLY DISINTEGRATING ORAL EVERY 4 HOURS PRN
Status: DISCONTINUED | OUTPATIENT
Start: 2017-08-06 | End: 2017-08-12 | Stop reason: HOSPADM

## 2017-08-06 RX ORDER — PROMETHAZINE HYDROCHLORIDE 25 MG/1
12.5-25 TABLET ORAL EVERY 4 HOURS PRN
Status: DISCONTINUED | OUTPATIENT
Start: 2017-08-06 | End: 2017-08-12 | Stop reason: HOSPADM

## 2017-08-06 RX ORDER — ONDANSETRON 2 MG/ML
4 INJECTION INTRAMUSCULAR; INTRAVENOUS EVERY 4 HOURS
Status: DISCONTINUED | OUTPATIENT
Start: 2017-08-06 | End: 2017-08-06

## 2017-08-06 RX ORDER — AMOXICILLIN 250 MG
2 CAPSULE ORAL 2 TIMES DAILY
Status: DISCONTINUED | OUTPATIENT
Start: 2017-08-07 | End: 2017-08-12 | Stop reason: HOSPADM

## 2017-08-06 RX ORDER — ONDANSETRON 2 MG/ML
4 INJECTION INTRAMUSCULAR; INTRAVENOUS EVERY 4 HOURS PRN
Status: DISCONTINUED | OUTPATIENT
Start: 2017-08-06 | End: 2017-08-12 | Stop reason: HOSPADM

## 2017-08-06 RX ORDER — AMOXICILLIN 250 MG
2 CAPSULE ORAL ONCE
Status: COMPLETED | OUTPATIENT
Start: 2017-08-06 | End: 2017-08-06

## 2017-08-06 RX ORDER — PREDNISONE 10 MG/1
5 TABLET ORAL 2 TIMES DAILY
Status: DISCONTINUED | OUTPATIENT
Start: 2017-08-06 | End: 2017-08-11

## 2017-08-06 RX ORDER — PREDNISONE 5 MG/1
5 TABLET ORAL DAILY
Status: ON HOLD | COMMUNITY
End: 2017-08-25

## 2017-08-06 RX ORDER — BISACODYL 10 MG
10 SUPPOSITORY, RECTAL RECTAL
Status: DISCONTINUED | OUTPATIENT
Start: 2017-08-06 | End: 2017-08-12 | Stop reason: HOSPADM

## 2017-08-06 RX ORDER — ACETAMINOPHEN 325 MG/1
650 TABLET ORAL EVERY 6 HOURS PRN
Status: DISCONTINUED | OUTPATIENT
Start: 2017-08-06 | End: 2017-08-12 | Stop reason: HOSPADM

## 2017-08-06 RX ORDER — ONDANSETRON 2 MG/ML
4 INJECTION INTRAMUSCULAR; INTRAVENOUS ONCE
Status: COMPLETED | OUTPATIENT
Start: 2017-08-06 | End: 2017-08-06

## 2017-08-06 RX ORDER — SODIUM CHLORIDE 9 MG/ML
INJECTION, SOLUTION INTRAVENOUS CONTINUOUS
Status: DISCONTINUED | OUTPATIENT
Start: 2017-08-06 | End: 2017-08-06

## 2017-08-06 RX ORDER — POLYETHYLENE GLYCOL 3350 17 G/17G
1 POWDER, FOR SOLUTION ORAL
Status: DISCONTINUED | OUTPATIENT
Start: 2017-08-06 | End: 2017-08-12 | Stop reason: HOSPADM

## 2017-08-06 RX ORDER — SODIUM CHLORIDE 9 MG/ML
INJECTION, SOLUTION INTRAVENOUS CONTINUOUS
Status: DISCONTINUED | OUTPATIENT
Start: 2017-08-06 | End: 2017-08-11

## 2017-08-06 RX ORDER — PROMETHAZINE HYDROCHLORIDE 25 MG/1
12.5-25 SUPPOSITORY RECTAL EVERY 4 HOURS PRN
Status: DISCONTINUED | OUTPATIENT
Start: 2017-08-06 | End: 2017-08-12 | Stop reason: HOSPADM

## 2017-08-06 RX ADMIN — PROMETHAZINE HYDROCHLORIDE 25 MG: 25 TABLET ORAL at 20:13

## 2017-08-06 RX ADMIN — PREDNISONE 5 MG: 5 TABLET ORAL at 20:13

## 2017-08-06 RX ADMIN — STANDARDIZED SENNA CONCENTRATE AND DOCUSATE SODIUM 2 TABLET: 8.6; 5 TABLET, FILM COATED ORAL at 20:21

## 2017-08-06 RX ADMIN — SODIUM CHLORIDE: 9 INJECTION, SOLUTION INTRAVENOUS at 20:14

## 2017-08-06 RX ADMIN — SODIUM CHLORIDE: 9 INJECTION, SOLUTION INTRAVENOUS at 15:26

## 2017-08-06 RX ADMIN — ONDANSETRON 4 MG: 2 INJECTION INTRAMUSCULAR; INTRAVENOUS at 15:24

## 2017-08-06 ASSESSMENT — LIFESTYLE VARIABLES
EVER_SMOKED: YES
DO YOU DRINK ALCOHOL: NO

## 2017-08-06 ASSESSMENT — ENCOUNTER SYMPTOMS
PALPITATIONS: 0
CHILLS: 0
SENSORY CHANGE: 0
ABDOMINAL PAIN: 0
FLANK PAIN: 0
DIZZINESS: 0
BACK PAIN: 0
NAUSEA: 0
SPEECH CHANGE: 0
SHORTNESS OF BREATH: 0
HEADACHES: 0
VOMITING: 0
NERVOUS/ANXIOUS: 0
FEVER: 0
CONSTIPATION: 0
FOCAL WEAKNESS: 0
MYALGIAS: 0
DEPRESSION: 0
COUGH: 0
DIARRHEA: 0
MEMORY LOSS: 0
WEAKNESS: 1
FALLS: 0

## 2017-08-06 ASSESSMENT — COPD QUESTIONNAIRES
COPD SCREENING SCORE: 1
DO YOU EVER COUGH UP ANY MUCUS OR PHLEGM?: NO/ONLY WITH OCCASIONAL COLDS OR INFECTIONS
DURING THE PAST 4 WEEKS HOW MUCH DID YOU FEEL SHORT OF BREATH: NONE/LITTLE OF THE TIME
HAVE YOU SMOKED AT LEAST 100 CIGARETTES IN YOUR ENTIRE LIFE: NO/DON'T KNOW

## 2017-08-06 ASSESSMENT — PATIENT HEALTH QUESTIONNAIRE - PHQ9
SUM OF ALL RESPONSES TO PHQ9 QUESTIONS 1 AND 2: 0
SUM OF ALL RESPONSES TO PHQ QUESTIONS 1-9: 0
1. LITTLE INTEREST OR PLEASURE IN DOING THINGS: NOT AT ALL
2. FEELING DOWN, DEPRESSED, IRRITABLE, OR HOPELESS: NOT AT ALL

## 2017-08-06 ASSESSMENT — PAIN SCALES - GENERAL: PAINLEVEL_OUTOF10: 1

## 2017-08-06 NOTE — IP AVS SNAPSHOT
" Home Care Instructions                                                                                                                  Name:Rhys Yang  Medical Record Number:[id number]  CSN: [id number]    YOB: 1960   Age: 57 y.o.  Sex: male  HT:1.753 m (5' 9\") WT: 87.7 kg (193 lb 5.5 oz)          Admit Date: 8/6/2017     Discharge Date:   Today's Date: 8/12/2017  Attending Doctor:  Wilber Chand M.D.                  Allergies:  Review of patient's allergies indicates no known allergies.            Discharge Instructions       Discharge Instructions    Discharged to home by car with relative. Discharged via wheelchair, hospital escort: Yes.  Special equipment needed: Not Applicable    Be sure to schedule a follow-up appointment with your primary care doctor or any specialists as instructed.     Discharge Plan:   Influenza Vaccine Indication: Patient Refuses    I understand that a diet low in cholesterol, fat, and sodium is recommended for good health. Unless I have been given specific instructions below for another diet, I accept this instruction as my diet prescription.   Other diet: Regular    Special Instructions: None    · Is patient discharged on Warfarin / Coumadin?   No     · Is patient Post Blood Transfusion?  No    Depression / Suicide Risk    As you are discharged from this [facility] facility, it is important to learn how to keep safe from harming yourself.    Recognize the warning signs:  · Abrupt changes in personality, positive or negative- including increase in energy   · Giving away possessions  · Change in eating patterns- significant weight changes-  positive or negative  · Change in sleeping patterns- unable to sleep or sleeping all the time   · Unwillingness or inability to communicate  · Depression  · Unusual sadness, discouragement and loneliness  · Talk of wanting to die  · Neglect of personal appearance   · Rebelliousness- reckless behavior  · Withdrawal from " people/activities they love  · Confusion- inability to concentrate     If you or a loved one observes any of these behaviors or has concerns about self-harm, here's what you can do:  · Talk about it- your feelings and reasons for harming yourself  · Remove any means that you might use to hurt yourself (examples: pills, rope, extension cords, firearm)  · Get professional help from the community (Mental Health, Substance Abuse, psychological counseling)  · Do not be alone:Call your Safe Contact- someone whom you trust who will be there for you.  · Call your local CRISIS HOTLINE 400-3676 or 847-452-3168  · Call your local Children's Mobile Crisis Response Team [place] (857) 565-1362 or www.Manjrasoft  · Call the toll free National Suicide Prevention Hotlines   · National Suicide Prevention Lifeline 539-185-EHAW (4317)  · National Hope Line Network 800-SUICIDE (738-0800)        Follow-up Information     1. Schedule an appointment as soon as possible for a visit with Lisandro Price M.D..    Specialty:  Oncology    Contact information    [address] [facility] [address] [phone]          2. Follow [facility] with [facility] [place] [facility], Emergency Dept.    Specialty:  Emergency Medicine    Why:  As needed, If symptoms worsen    Contact information    [address] [facility] [address] [phone]         Discharge Medication Instructions:    Below are the medications your physician expects you to take upon discharge:    Review all your home medications and newly ordered medications with your doctor and/or pharmacist. Follow medication instructions as directed by your doctor and/or pharmacist.    Please keep your medication list with you and share with your physician.               Medication List      START taking these medications        Instructions    Morning Afternoon Evening Bedtime    gabapentin 100 MG Caps   Last time this was given:  100 mg on 8/12/2017  2:12 PM   Commonly known as:   NEURONTIN        Take 2 Caps by mouth 3 times a day.   Dose:  200 mg                        lidocaine 5 % Ptch   Last time this was given:  1 Patch on 8/11/2017  8:52 PM   Commonly known as:  LIDODERM        Apply 1 Patch to skin as directed every 24 hours.   Dose:  1 Patch                        methylphenidate 5 MG Tabs   Last time this was given:  5 mg on 8/10/2017  9:32 AM   Commonly known as:  RITALIN        Take 1 Tab by mouth 2 Times a Day.   Dose:  5 mg                        ondansetron 4 MG Tbdp   Commonly known as:  ZOFRAN ODT        Take 1 Tab by mouth every 8 hours as needed for Nausea/Vomiting (give PO if no IV route available).   Dose:  4 mg                        senna-docusate 8.6-50 MG Tabs   Last time this was given:  2 Tabs on 8/11/2017  9:37 AM   Commonly known as:  PERICOLACE or SENOKOT S        Take 2 Tabs by mouth 2 times a day as needed.   Dose:  2 Tab                        temazepam 15 MG Caps   Commonly known as:  RESTORIL        Take 1 Cap by mouth at bedtime as needed.   Dose:  15 mg                        tramadol 50 MG Tabs   Last time this was given:  50 mg on 8/12/2017  1:30 PM   Commonly known as:  ULTRAM        Take 1 Tab by mouth every 6 hours as needed.   Dose:  50 mg                          CONTINUE taking these medications        Instructions    Morning Afternoon Evening Bedtime    alprazolam 0.5 MG Tabs   Commonly known as:  XANAX        Take 0.5 mg by mouth every bedtime.   Dose:  0.5 mg                        leuprolide 22.5 MG Kit   Commonly known as:  LUPRON        22.5 mg by Intramuscular route Every 3 Months.   Dose:  22.5 mg                        predniSONE 5 MG Tabs   Last time this was given:  5 mg on 8/12/2017  8:22 AM   Commonly known as:  DELTASONE        Take 5 mg by mouth 2 times a day.   Dose:  5 mg                        XTANDI 40 MG Caps   Last time this was given:  160 mg on 8/9/2017  9:00 PM   Generic drug:  Enzalutamide        Take 160 mg by mouth every  evening.   Dose:  160 mg                             Where to Get Your Medications      Information about where to get these medications is not yet available     ! Ask your nurse or doctor about these medications    - gabapentin 100 MG Caps  - lidocaine 5 % Ptch  - methylphenidate 5 MG Tabs  - ondansetron 4 MG Tbdp  - senna-docusate 8.6-50 MG Tabs  - temazepam 15 MG Caps  - tramadol 50 MG Tabs            Orders for after discharge     REFERRAL TO HOME HEALTH    Complete by:  As directed    Home health will create and establish a plan of care             Instructions           Diet / Nutrition:    Follow any diet instructions given to you by your doctor or the dietician, including how much salt (sodium) you are allowed each day.    If you are overweight, talk to your doctor about a weight reduction plan.    Activity:    Remain physically active following your doctor's instructions about exercise and activity.    Rest often.     Any time you become even a little tired or short of breath, SIT DOWN and rest.    Worsening Symptoms:    Report any of the following signs and symptoms to the doctor's office immediately:    *Pain of jaw, arm, or neck  *Chest pain not relieved by medication                               *Dizziness or loss of consciousness  *Difficulty breathing even when at rest   *More tired than usual                                       *Bleeding drainage or swelling of surgical site  *Swelling of feet, ankles, legs or stomach                 *Fever (>100ºF)  *Pink or blood tinged sputum  *Weight gain (3lbs/day or 5lbs /week)           *Shock from internal defibrillator (if applicable)  *Palpitations or irregular heartbeats                *Cool and/or numb extremities    Stroke Awareness    Common Risk Factors for Stroke include:    Age  Atrial Fibrillation  Carotid Artery Stenosis  Diabetes Mellitus  Excessive alcohol consumption  High blood pressure  Overweight   Physical inactivity  Smoking    Warning  signs and symptoms of a stroke include:    *Sudden numbness or weakness of the face, arm or leg (especially on one side of the body).  *Sudden confusion, trouble speaking or understanding.  *Sudden trouble seeing in one or both eyes.  *Sudden trouble walking, dizziness, loss of balance or coordination.Sudden severe headache with no known cause.    It is very important to get treatment quickly when a stroke occurs. If you experience any of the above warning signs, call 911 immediately.                   Disclaimer         Quit Smoking / Tobacco Use:    I understand the use of any tobacco products increases my chance of suffering from future heart disease or stroke and could cause other illnesses which may shorten my life. Quitting the use of tobacco products is the single most important thing I can do to improve my health. For further information on smoking / tobacco cessation call a Toll Free Quit Line at 1-338.786.6934 (*National Cancer [place]) or 1-701.628.7614 (American Lung Association) or you can access the web based program at www.lungTraditional Medicinals.org.    [place] Tobacco Users Help Line:  (756) 433-6726       Toll Free: 1-791.989.9305  Quit Tobacco Program [facility] (757)103-1231    Crisis Hotline:    [place] Crisis Hotline:  7-161-GWNCHBA or 1-723.820.2510    [place] Crisis Hotline:    1-483.150.2661 or 739-165-2041    Discharge Survey:   Thank you for choosing [facility]. We hope we did everything we could to make your hospital stay a pleasant one. You may be receiving a phone survey and we would appreciate your time and participation in answering the questions. Your input is very valuable to us in our efforts to improve our service to our patients and their families.        My signature on this form indicates that:    1. I have reviewed and understand the above information.  2. My questions regarding this information have been answered to my satisfaction.  3. I have formulated a plan  with my discharge nurse to obtain my prescribed medications for home.                  Disclaimer         __________________________________                     __________       ________                       Patient Signature                                                 Date                    Time

## 2017-08-06 NOTE — IP AVS SNAPSHOT
8/12/2017    Rhys Yang  [address]  [place] [address]    Dear Rhys:    [facility] wants to ensure your discharge home is safe and you or your loved ones have had all of your questions answered regarding your care after you leave the hospital.    Below is a list of resources and contact information should you have any questions regarding your hospital stay, follow-up instructions, or active medical symptoms.    Questions or Concerns Regarding… Contact   Medical Questions Related to Your Discharge  (7 days a week, 8am-5pm) Contact a Nurse Care Coordinator   [phone]   Medical Questions Not Related to Your Discharge  (24 hours a day / 7 days a week)  Contact the Nurse Health Line   [phone]    Medications or Discharge Instructions Refer to your discharge packet   or contact your [facility] Primary Care Provider   [phone]   Follow-up Appointment(s) Schedule your appointment via Bench   or contact Scheduling [phone]   Billing Review your statement via Bench  or contact Billing [phone]   Medical Records Review your records via Bench   or contact Medical Records [phone]     You may receive a telephone call within two days of discharge. This call is to make certain you understand your discharge instructions and have the opportunity to have any questions answered. You can also easily access your medical information, test results and upcoming appointments via the Bench free online health management tool. You can learn more and sign up at Toopher/Bench. For assistance setting up your Bench account, please call [phone].    Once again, we want to ensure your discharge home is safe and that you have a clear understanding of any next steps in your care. If you have any questions or concerns, please do not hesitate to contact us, we are here for you. Thank you for choosing [facility] for your healthcare needs.    Sincerely,    Your [facility] Healthcare Team

## 2017-08-06 NOTE — ED PROVIDER NOTES
"ED Provider Note    CHIEF COMPLAINT  Weakness. Back pain. Stage IV prostate cancer.    HPI  Rhys Yang is a 57 y.o. male who presents with severe weakness and diaphoresis. He has stage IV prostate cancer metastases to bone. He's had his prostate removed 10 years ago. He's been undergoing chemotherapy previously. He underwent blood transfusion a couple of those here. He is at increased weakness and lethargy and sweating. No headache or chest pain or abdominal pain.    REVIEW OF SYSTEMS  No headache, no chest pain, no abdominal pain.  ALL OTHER SYSTEMS NEGATIVE    ALLERGIES  No Known Allergies    CURRENT MEDICATIONS  Xanax,    PAST MEDICAL HISTORY  Past Medical History   Diagnosis Date   • Cancer (CMS-HCC) 2007     prostate   • Breath shortness    • Claustrophobia        SURGICAL HISTORY  Past Surgical History   Procedure Laterality Date   • Prostatectomy, radial  2007   • Recovery  11/23/2015     Procedure: CT-CT GUIDED LEFT LUNG BIOPSY**Per **;  Surgeon: Recoveryonly Surgery;  Location: SURGERY PRE-POST PROC UNIT [facility];  Service:        FAMILY HISTORY  Family History   Problem Relation Age of Onset   • Cancer Father      lung cancer       SOCIAL HISTORY  Nonsmoker.    PHYSICAL EXAM  GENERAL: Pale and diaphoretic male adult.  VITAL SIGNS: /63 mmHg  Pulse 82  Temp(Src) 35.8 °C (96.5 °F)  Resp 14  Ht 1.753 m (5' 9.02\")  Wt 90.719 kg (200 lb)  BMI 29.52 kg/m2  SpO2 99%   Constitutional: Alert male and diaphoretic male adult HENT: Scalp is normal size and nontender. Ears are clear. Nose is clear. Throat is clear with no stridor no drooling no trismus. Teeth are all intact.  Eyes: Pupils equal round and reactive to light, extraocular motor fall. There is no scleral icterus.  Neck: Neck is supple and nontender. There is no meningismus. No adenitis. No thyromegaly.  Lymphatic: No adenopathy.   Cardiovascular: Heart regular rhythm without murmurs or gallops   Thorax & Lungs: No chest " wall tenderness. Lungs are clear. Patient has good breath sounds bilateral. No rales, no rhonchi, no wheezes.  Abdomen: Abdomen is soft, nontender, not rigid, no guarding, and no organomegaly. There is no palpable hernia   Skin: Warm, pink, and dry with no erythema and no rash.   Extremities: Full range of motion  No tenderness to palpation and no deformities noted. No calf or thigh swelling. No calf or thigh tenderness. No clinical DVT.  Neurologic: Alert & oriented . Cranial nerves are grossly intact as tested. Patient moves all 4 extremities well. Patient has good strong flexion and extension of the ankle joints knee joints hip joints and elbow joints. Sensation is normal and symmetrical in the upper and lower extremities.   Psychiatric: Patient is alert oriented coherent and rational.   DX-CHEST-PORTABLE (1 VIEW)   Final Result      1.  No significant change in left suprahilar opacification      2.  Sclerotic lesion in the proximal right humerus is consistent with known metastasis.            COURSE & MEDICAL DECISION MAKING  Patient has stage IV metastatic prostate cancer with metastases to bone. He severely diaphoretic and weak and tired with no energy. Cannot walk. He is a full code. He was 1st diagnosed in 2007. He was transfused a few days ago. Wife is at the bedside.    Plan: #1 IV #2 IV fluids #3 intravenous Zofran No. 4 he'll be given intravenous Dilaudid as needed. #5. Lab including CBC, CMP, T4, TSH. #6. Admission to the hospital or rehydration and further evaluation.    Laboratory and reexamination: Hemoglobin is 8.5. White count 5000. INR normal. T4 normal. Troponin negative. Sodium 130. Alk phos and SGOT is elevated. Chest x-ray is normal.  Results for orders placed or performed during the hospital encounter of 08/06/17   CBC WITH DIFFERENTIAL   Result Value Ref Range    WBC 4.8 4.8 - 10.8 K/uL    RBC 3.05 (L) 4.70 - 6.10 M/uL    Hemoglobin 8.5 (L) 14.0 - 18.0 g/dL    Hematocrit 25.8 (L) 42.0 - 52.0  %    MCV 84.6 81.4 - 97.8 fL    MCH 27.9 27.0 - 33.0 pg    MCHC 32.9 (L) 33.7 - 35.3 g/dL    RDW 50.4 (H) 35.9 - 50.0 fL    Platelet Count 144 (L) 164 - 446 K/uL    MPV 7.8 (L) 9.0 - 12.9 fL    Nucleated RBC 1.70 /100 WBC    NRBC (Absolute) 0.08 K/uL    Neutrophils-Polys 76.10 (H) 44.00 - 72.00 %    Lymphocytes 13.30 (L) 22.00 - 41.00 %    Monocytes 7.10 0.00 - 13.40 %    Eosinophils 1.70 0.00 - 6.90 %    Basophils 0.90 0.00 - 1.80 %    Neutrophils (Absolute) 3.70 1.82 - 7.42 K/uL    Lymphs (Absolute) 0.64 (L) 1.00 - 4.80 K/uL    Monos (Absolute) 0.34 0.00 - 0.85 K/uL    Eos (Absolute) 0.08 0.00 - 0.51 K/uL    Baso (Absolute) 0.04 0.00 - 0.12 K/uL    Anisocytosis 1+     Microcytosis 1+    COMP METABOLIC PANEL   Result Value Ref Range    Sodium 130 (L) 135 - 145 mmol/L    Potassium 4.3 3.6 - 5.5 mmol/L    Chloride 99 96 - 112 mmol/L    Co2 23 20 - 33 mmol/L    Anion Gap 8.0 0.0 - 11.9    Glucose 98 65 - 99 mg/dL    Bun 19 8 - 22 mg/dL    Creatinine 0.63 0.50 - 1.40 mg/dL    Calcium 8.9 8.5 - 10.5 mg/dL    AST(SGOT) 94 (H) 12 - 45 U/L    ALT(SGPT) 44 2 - 50 U/L    Alkaline Phosphatase 410 (H) 30 - 99 U/L    Total Bilirubin 0.7 0.1 - 1.5 mg/dL    Albumin 3.3 3.2 - 4.9 g/dL    Total Protein 6.8 6.0 - 8.2 g/dL    Globulin 3.5 1.9 - 3.5 g/dL    A-G Ratio 0.9 g/dL   PROTHROMBIN TIME   Result Value Ref Range    PT 14.4 12.0 - 14.6 sec    INR 1.09 0.87 - 1.13   BTYPE NATRIURETIC PEPTIDE   Result Value Ref Range    B Natriuretic Peptide 8 0 - 100 pg/mL   FREE THYROXINE   Result Value Ref Range    Free T-4 1.08 0.53 - 1.43 ng/dL   ESTIMATED GFR   Result Value Ref Range    GFR If African American >60 >60 mL/min/1.73 m 2    GFR If Non African American >60 >60 mL/min/1.73 m 2   TROPONIN   Result Value Ref Range    Troponin I <0.01 0.00 - 0.04 ng/mL   DIFFERENTIAL MANUAL   Result Value Ref Range    Bands-Stabs 0.90 0.00 - 10.00 %    Manual Diff Status PERFORMED    PERIPHERAL SMEAR REVIEW   Result Value Ref Range    Peripheral  Smear Review see below    PLATELET ESTIMATE   Result Value Ref Range    Plt Estimation Normal    MORPHOLOGY   Result Value Ref Range    RBC Morphology Present     Polychromia 1+     Poikilocytosis 1+     Tear Drop Cells 1+         The hospitalist Dr. Mey Harding has been called and case discussed. Stable on admission at 4:30 PM.  FINAL IMPRESSION  1. Anemia  2. Severe lethargy and weakness  3. Stage IV metastatic prostate cancer   4. Full code.  5.. Hyponatremia    Electronically signed by: Gary Gansert, 8/6/2017/4:30 PM

## 2017-08-06 NOTE — IP AVS SNAPSHOT
" <p align=\"LEFT\"><IMG SRC=\"//[email or web address]$/[email or web address]\" alt=\"Image\" WIDTH=\"50%\" HEIGHT=\"200\" BORDER=\"\"></p>                   Name:Rhys Yang  Medical Record Number:[id number]  CSN: [id number]    YOB: 1960   Age: 57 y.o.  Sex: male  HT:1.753 m (5' 9\") WT: 87.7 kg (193 lb 5.5 oz)          Admit Date: 8/6/2017     Discharge Date:   Today's Date: 8/12/2017  Attending Doctor:  Wilber Chand M.D.                  Allergies:  Review of patient's allergies indicates no known allergies.          Follow-up Information     1. Schedule an appointment as soon as possible for a visit with Lisandro Price M.D..    Specialty:  Oncology    Contact information    [address] [place] [id number] [phone]          2. Follow up with [facility], Emergency Dept.    Specialty:  Emergency Medicine    Why:  As needed, If symptoms worsen    Contact information    [facility] [facility] [address] [phone]         Medication List      Take these Medications        Instructions    alprazolam 0.5 MG Tabs   Commonly known as:  XANAX    Take 0.5 mg by mouth every bedtime.   Dose:  0.5 mg       gabapentin 100 MG Caps   Commonly known as:  NEURONTIN    Take 2 Caps by mouth 3 times a day.   Dose:  200 mg       leuprolide 22.5 MG Kit   Commonly known as:  LUPRON    22.5 mg by Intramuscular route Every 3 Months.   Dose:  22.5 mg       lidocaine 5 % Ptch   Commonly known as:  LIDODERM    Apply 1 Patch to skin as directed every 24 hours.   Dose:  1 Patch       methylphenidate 5 MG Tabs   Commonly known as:  RITALIN    Take 1 Tab by mouth 2 Times a Day.   Dose:  5 mg       ondansetron 4 MG Tbdp   Commonly known as:  ZOFRAN ODT    Take 1 Tab by mouth every 8 hours as needed for Nausea/Vomiting (give PO if no IV route available).   Dose:  4 mg       predniSONE 5 MG Tabs   Commonly known as:  DELTASONE    Take 5 mg by mouth 2 times a day.   Dose:  5 mg       senna-docusate 8.6-50 MG Tabs   Commonly " known as:  PERICOLACE or SENOKOT S    Take 2 Tabs by mouth 2 times a day as needed.   Dose:  2 Tab       temazepam 15 MG Caps   Commonly known as:  RESTORIL    Take 1 Cap by mouth at bedtime as needed.   Dose:  15 mg       tramadol 50 MG Tabs   Commonly known as:  ULTRAM    Take 1 Tab by mouth every 6 hours as needed.   Dose:  50 mg       XTANDI 40 MG Caps   Generic drug:  Enzalutamide    Take 160 mg by mouth every evening.   Dose:  160 mg

## 2017-08-06 NOTE — ED NOTES
BIB [facility] from home for increasing weakness over past several days. Hx of stage IV prostate CA, anemic and required transfusion 4 days ago. Denies apparent blood loss or dark stool. VSS, appears diaphoretic but states it has been a symptom of one of his medications. Blood drawn and sent to lab. Chart up for ERP.

## 2017-08-06 NOTE — IP AVS SNAPSHOT
Defywire Access Code: [email or web address]  Expires: 8/17/2017  4:10 AM    Your email address is not on file at [facility].  Email Addresses are required for you to sign up for Defywire, please contact [phone] to verify your personal information and to provide your email address prior to attempting to register for Defywire.    Rhyserica Banuelos Trey  [address] [place] [address]    Defywire  A secure, online tool to manage your health information     [facility]’s Defywire® is a secure, online tool that connects you to your personalized health information from the privacy of your home -- day or night - making it very easy for you to manage your healthcare. Once the activation process is completed, you can even access your medical information using the Defywire dominic, which is available for free in the Apple Dominic store or Google Play store.     To learn more about Defywire, visit [email or web address]    There are two levels of access available (as shown below):   My Chart Features  [facility] Primary Care Doctor [facility]  Specialists [facility]  Urgent  Care Non-[facility] Primary Care Doctor   Email your healthcare team securely and privately 24/7 X X X    Manage appointments: schedule your next appointment; view details of past/upcoming appointments X      Request prescription refills. X      View recent personal medical records, including lab and immunizations X X X X   View health record, including health history, allergies, medications X X X X   Read reports about your outpatient visits, procedures, consult and ER notes X X X X   See your discharge summary, which is a recap of your hospital and/or ER visit that includes your diagnosis, lab results, and care plan X X  X     How to register for Defywire:  Once your e-mail address has been verified, follow the following steps to sign up for Defywire.     1. Go to  [email or web address]  2. Click on the Sign Up Now box, which takes you to the New Member Sign Up page. You  will need to provide the following information:  a. Enter your [employer] Access Code exactly as it appears at the top of this page. (You will not need to use this code after you’ve completed the sign-up process. If you do not sign up before the expiration date, you must request a new code.)   b. Enter your date of birth.   c. Enter your home email address.   d. Click Submit, and follow the next screen’s instructions.  3. Create a [email or web address] ID. This will be your [employer] login ID and cannot be changed, so think of one that is secure and easy to remember.  4. Create a [employer] password. You can change your password at any time.  5. Enter your Password Reset Question and Answer. This can be used at a later time if you forget your password.   6. Enter your e-mail address. This allows you to receive e-mail notifications when new information is available in [employer].  7. Click Sign Up. You can now view your health information.    For assistance activating your [employer] account, call [phone]

## 2017-08-07 ENCOUNTER — APPOINTMENT (OUTPATIENT)
Dept: RADIOLOGY | Facility: MEDICAL CENTER | Age: 57
DRG: 723 | End: 2017-08-07
Attending: NURSE PRACTITIONER
Payer: COMMERCIAL

## 2017-08-07 ENCOUNTER — PATIENT OUTREACH (OUTPATIENT)
Dept: OTHER | Facility: MEDICAL CENTER | Age: 57
End: 2017-08-07

## 2017-08-07 PROBLEM — F32.A DEPRESSION: Status: ACTIVE | Noted: 2017-08-07

## 2017-08-07 LAB
ABO GROUP BLD: NORMAL
ANION GAP SERPL CALC-SCNC: 6 MMOL/L (ref 0–11.9)
BARCODED ABORH UBTYP: 600
BARCODED PRD CODE UBPRD: NORMAL
BARCODED UNIT NUM UBUNT: NORMAL
BLD GP AB SCN SERPL QL: NORMAL
BUN SERPL-MCNC: 14 MG/DL (ref 8–22)
CALCIUM SERPL-MCNC: 8.1 MG/DL (ref 8.5–10.5)
CHLORIDE SERPL-SCNC: 104 MMOL/L (ref 96–112)
CO2 SERPL-SCNC: 24 MMOL/L (ref 20–33)
COMPONENT R 8504R: NORMAL
CREAT SERPL-MCNC: 0.52 MG/DL (ref 0.5–1.4)
ERYTHROCYTE [DISTWIDTH] IN BLOOD BY AUTOMATED COUNT: 51.8 FL (ref 35.9–50)
GFR SERPL CREATININE-BSD FRML MDRD: >60 ML/MIN/1.73 M 2
GLUCOSE SERPL-MCNC: 96 MG/DL (ref 65–99)
HCT VFR BLD AUTO: 22.9 % (ref 42–52)
HCT VFR BLD AUTO: 23 % (ref 42–52)
HGB BLD-MCNC: 7.4 G/DL (ref 14–18)
HGB BLD-MCNC: 7.6 G/DL (ref 14–18)
HGB RETIC QN AUTO: 27.6 PG/CELL (ref 29–35)
IMM RETICS NFR: 29.8 % (ref 9.3–17.4)
LDH SERPL-CCNC: 2803 U/L (ref 107–266)
MCH RBC QN AUTO: 28.7 PG (ref 27–33)
MCHC RBC AUTO-ENTMCNC: 33.2 G/DL (ref 33.7–35.3)
MCV RBC AUTO: 86.4 FL (ref 81.4–97.8)
PLATELET # BLD AUTO: 128 K/UL (ref 164–446)
PMV BLD AUTO: 9.1 FL (ref 9–12.9)
POTASSIUM SERPL-SCNC: 4.3 MMOL/L (ref 3.6–5.5)
PRODUCT TYPE UPROD: NORMAL
RBC # BLD AUTO: 2.65 M/UL (ref 4.7–6.1)
RETICS # AUTO: 0.07 M/UL (ref 0.04–0.06)
RETICS/RBC NFR: 2.7 % (ref 0.8–2.1)
RH BLD: NORMAL
SODIUM SERPL-SCNC: 134 MMOL/L (ref 135–145)
UNIT STATUS USTAT: NORMAL
WBC # BLD AUTO: 4 K/UL (ref 4.8–10.8)

## 2017-08-07 PROCEDURE — 85027 COMPLETE CBC AUTOMATED: CPT

## 2017-08-07 PROCEDURE — 96360 HYDRATION IV INFUSION INIT: CPT

## 2017-08-07 PROCEDURE — 86900 BLOOD TYPING SEROLOGIC ABO: CPT

## 2017-08-07 PROCEDURE — 92610 EVALUATE SWALLOWING FUNCTION: CPT

## 2017-08-07 PROCEDURE — 85014 HEMATOCRIT: CPT

## 2017-08-07 PROCEDURE — 86901 BLOOD TYPING SEROLOGIC RH(D): CPT

## 2017-08-07 PROCEDURE — G8987 SELF CARE CURRENT STATUS: HCPCS | Mod: CJ

## 2017-08-07 PROCEDURE — 83615 LACTATE (LD) (LDH) ENZYME: CPT

## 2017-08-07 PROCEDURE — 770004 HCHG ROOM/CARE - ONCOLOGY PRIVATE *

## 2017-08-07 PROCEDURE — 85018 HEMOGLOBIN: CPT

## 2017-08-07 PROCEDURE — A9270 NON-COVERED ITEM OR SERVICE: HCPCS | Performed by: INTERNAL MEDICINE

## 2017-08-07 PROCEDURE — 700111 HCHG RX REV CODE 636 W/ 250 OVERRIDE (IP): Performed by: INTERNAL MEDICINE

## 2017-08-07 PROCEDURE — 700117 HCHG RX CONTRAST REV CODE 255: Performed by: HOSPITALIST

## 2017-08-07 PROCEDURE — 99233 SBSQ HOSP IP/OBS HIGH 50: CPT | Performed by: HOSPITALIST

## 2017-08-07 PROCEDURE — 30233N1 TRANSFUSION OF NONAUTOLOGOUS RED BLOOD CELLS INTO PERIPHERAL VEIN, PERCUTANEOUS APPROACH: ICD-10-PCS | Performed by: HOSPITALIST

## 2017-08-07 PROCEDURE — G0378 HOSPITAL OBSERVATION PER HR: HCPCS

## 2017-08-07 PROCEDURE — G8998 SWALLOW D/C STATUS: HCPCS | Mod: CH

## 2017-08-07 PROCEDURE — 86850 RBC ANTIBODY SCREEN: CPT

## 2017-08-07 PROCEDURE — 96361 HYDRATE IV INFUSION ADD-ON: CPT

## 2017-08-07 PROCEDURE — 80048 BASIC METABOLIC PNL TOTAL CA: CPT

## 2017-08-07 PROCEDURE — 85046 RETICYTE/HGB CONCENTRATE: CPT

## 2017-08-07 PROCEDURE — G8997 SWALLOW GOAL STATUS: HCPCS | Mod: CH

## 2017-08-07 PROCEDURE — 700105 HCHG RX REV CODE 258: Performed by: INTERNAL MEDICINE

## 2017-08-07 PROCEDURE — 86644 CMV ANTIBODY: CPT

## 2017-08-07 PROCEDURE — 36415 COLL VENOUS BLD VENIPUNCTURE: CPT

## 2017-08-07 PROCEDURE — 36430 TRANSFUSION BLD/BLD COMPNT: CPT

## 2017-08-07 PROCEDURE — 97166 OT EVAL MOD COMPLEX 45 MIN: CPT

## 2017-08-07 PROCEDURE — 700102 HCHG RX REV CODE 250 W/ 637 OVERRIDE(OP): Performed by: INTERNAL MEDICINE

## 2017-08-07 PROCEDURE — 83010 ASSAY OF HAPTOGLOBIN QUANT: CPT

## 2017-08-07 PROCEDURE — G8988 SELF CARE GOAL STATUS: HCPCS | Mod: CI

## 2017-08-07 PROCEDURE — G8996 SWALLOW CURRENT STATUS: HCPCS | Mod: CH

## 2017-08-07 PROCEDURE — 74177 CT ABD & PELVIS W/CONTRAST: CPT

## 2017-08-07 PROCEDURE — 86923 COMPATIBILITY TEST ELECTRIC: CPT

## 2017-08-07 PROCEDURE — P9016 RBC LEUKOCYTES REDUCED: HCPCS

## 2017-08-07 RX ORDER — METHYLPHENIDATE HYDROCHLORIDE 5 MG/1
5 TABLET ORAL 2 TIMES DAILY
Status: DISCONTINUED | OUTPATIENT
Start: 2017-08-08 | End: 2017-08-12 | Stop reason: HOSPADM

## 2017-08-07 RX ADMIN — ACETAMINOPHEN 650 MG: 325 TABLET, FILM COATED ORAL at 12:13

## 2017-08-07 RX ADMIN — SODIUM CHLORIDE: 9 INJECTION, SOLUTION INTRAVENOUS at 09:24

## 2017-08-07 RX ADMIN — PREDNISONE 5 MG: 5 TABLET ORAL at 20:59

## 2017-08-07 RX ADMIN — IOHEXOL 100 ML: 350 INJECTION, SOLUTION INTRAVENOUS at 15:05

## 2017-08-07 RX ADMIN — STANDARDIZED SENNA CONCENTRATE AND DOCUSATE SODIUM 2 TABLET: 8.6; 5 TABLET, FILM COATED ORAL at 09:21

## 2017-08-07 RX ADMIN — PROMETHAZINE HYDROCHLORIDE 25 MG: 25 TABLET ORAL at 12:13

## 2017-08-07 RX ADMIN — ACETAMINOPHEN 650 MG: 325 TABLET, FILM COATED ORAL at 22:05

## 2017-08-07 RX ADMIN — PREDNISONE 5 MG: 5 TABLET ORAL at 09:21

## 2017-08-07 RX ADMIN — IOHEXOL 50 ML: 240 INJECTION, SOLUTION INTRATHECAL; INTRAVASCULAR; INTRAVENOUS; ORAL at 15:30

## 2017-08-07 RX ADMIN — PROMETHAZINE HYDROCHLORIDE 25 MG: 25 TABLET ORAL at 20:57

## 2017-08-07 ASSESSMENT — ENCOUNTER SYMPTOMS
NAUSEA: 1
HEADACHES: 1
CARDIOVASCULAR NEGATIVE: 1
WEIGHT LOSS: 1
CONSTIPATION: 1
EYES NEGATIVE: 1
RESPIRATORY NEGATIVE: 1
COUGH: 0
SEIZURES: 0
WEAKNESS: 1
DIARRHEA: 0
DOUBLE VISION: 0
DEPRESSION: 1
MYALGIAS: 1
TREMORS: 0
ABDOMINAL PAIN: 0
FOCAL WEAKNESS: 0
PALPITATIONS: 0
BACK PAIN: 1
TINGLING: 0
BLOOD IN STOOL: 0
DIAPHORESIS: 1
CHILLS: 0
BRUISES/BLEEDS EASILY: 0
LOSS OF CONSCIOUSNESS: 0
ROS SKIN COMMENTS: PALE
SHORTNESS OF BREATH: 0
DIZZINESS: 0
VOMITING: 1
BLURRED VISION: 0
FEVER: 0
SORE THROAT: 0
SENSORY CHANGE: 0

## 2017-08-07 ASSESSMENT — PAIN SCALES - GENERAL
PAINLEVEL_OUTOF10: 4
PAINLEVEL_OUTOF10: 5
PAINLEVEL_OUTOF10: 0

## 2017-08-07 ASSESSMENT — COGNITIVE AND FUNCTIONAL STATUS - GENERAL
SUGGESTED CMS G CODE MODIFIER DAILY ACTIVITY: CI
DRESSING REGULAR LOWER BODY CLOTHING: A LITTLE
DAILY ACTIVITIY SCORE: 23

## 2017-08-07 ASSESSMENT — ACTIVITIES OF DAILY LIVING (ADL): TOILETING: INDEPENDENT

## 2017-08-07 NOTE — PROGRESS NOTES
Received referral for nurse navigation from rehab where pt is admitted.  Attempted to contact wife, no answer, left a message to call back.

## 2017-08-07 NOTE — THERAPY
"Speech Language Therapy Clinical Swallow Evaluation completed.  Functional Status: Patient currently presents with functional swallow. Laryngeal elevation was palpated as complete. No significant asymmetry noted or oral musculature. His speech is intelligible. He denies having any s/s of dysphagia but does report a loss of appetite and has nausea. He is able to feed himself without difficulty. No overt signs of aspiration was noted   Recommendations - Diet:  Regular, thin                          Strategies: Head of Bed at 90 Degrees                          Medication Administration:    Plan of Care: Patient with no further skilled SLP needs in the acute care setting at this time  Post-Acute Therapy: Currently anticipate no further skilled therapy needs once patient is discharged from the inpatient setting.    See \"Rehab Therapy-Acute\" Patient Summary Report for complete documentation.   "

## 2017-08-07 NOTE — PROGRESS NOTES
Assessment per flow chart.  Pt A&Ox4, c/o nausea, pain, see flowchart.  Right A/C IV site- c,d,i.  Pt's SpO2-97% on R/A.  Discussed POC with pt, pt verbalized understanding and agreement.  Pt instructed to call for assistance, pt's belonging's and call light within reach. Will continue to monitor.

## 2017-08-07 NOTE — PROGRESS NOTES
Hospital Medicine Interval Note  Date of Service:  8/7/2017    Chief Complaint  57 y.o.-year-old male very well known to me personally with history of prostate cancer stage IV with bony metastases recent discharge on August 3 for anemia requiring 1 unit PRBC transfusion. Patient did have improvement of weakness on discharge. Patient now returns with increasing sig weakness (normally ambulatory and active) with decreased oral intake secondary to loss of appetite. Patient denies any hematuria denies any melena- no BM for 3 days. Patient denies any abdominal pain, chest pain, shortness of breath.       Interval Problem Update  8/7-still very weak, not eating, fatigued. I spoke with Dr Price- he recommended looking for hemolysis and getting CT abd- transfuse 1 unit PRBC, and start marinol if pt liver ok.    Consultants/Specialty  Telephone consult- Dr Price    Disposition  Follow hem labs, CT scan- start marinol if liver ok, palliative to cont to follow, transfuse and recheck Hgb, PT/OT- may need rehab. Admit to oncology- expect over 2 midnights stay     Review of Systems   Constitutional: Positive for weight loss, malaise/fatigue and diaphoresis (2/2 medication). Negative for fever and chills.   HENT: Negative for congestion and sore throat.    Eyes: Negative.  Negative for blurred vision and double vision.   Respiratory: Negative.  Negative for cough and shortness of breath.    Cardiovascular: Negative.  Negative for chest pain, palpitations and leg swelling.   Gastrointestinal: Positive for nausea, vomiting and constipation (3 days). Negative for abdominal pain, diarrhea, blood in stool and melena.   Genitourinary: Negative.  Negative for dysuria.   Musculoskeletal: Positive for myalgias, back pain and joint pain (right arm/shoulder).   Skin: Negative.  Negative for itching and rash.        pale   Neurological: Positive for weakness and headaches. Negative for dizziness, tingling, tremors, sensory change, focal  weakness, seizures and loss of consciousness.   Endo/Heme/Allergies: Negative.  Does not bruise/bleed easily.        Having anemia requiring transfusion    Psychiatric/Behavioral: Positive for depression (requests pysch consult- severe doesn't want to eat, see grandkids, do anything).      Physical Exam Laboratory/Imaging   Filed Vitals:    08/06/17 2325 08/07/17 0400 08/07/17 0825 08/07/17 1202   BP: 144/73 145/72 149/71 152/78   Pulse: 98 93 90 92   Temp: 36.7 °C (98 °F) 36.2 °C (97.2 °F) 36.3 °C (97.4 °F) 36.4 °C (97.6 °F)   Resp: 16 16 16 16   Height:       Weight:       SpO2: 96% 92% 90% 91%     Physical Exam   Constitutional: He is oriented to person, place, and time. He appears well-developed and well-nourished.   HENT:   Head: Normocephalic and atraumatic.   Mouth/Throat: No oropharyngeal exudate.   Dry MM   Eyes: Conjunctivae are normal. Right eye exhibits no discharge. Left eye exhibits no discharge. No scleral icterus.   Neck: Normal range of motion. Neck supple. No tracheal deviation present.   Cardiovascular: Normal rate, regular rhythm, normal heart sounds and intact distal pulses.    Pulmonary/Chest: Effort normal and breath sounds normal.   occ dec in O2 sats to 89% when sleeping   Abdominal: Soft. Bowel sounds are normal. He exhibits no distension and no mass. There is no tenderness. There is no rebound and no guarding.   Musculoskeletal: Normal range of motion. He exhibits tenderness. He exhibits no edema.   Neurological: He is oriented to person, place, and time.   Very drowsy, awakens easily to answer questions then back to sleep   Skin: Skin is warm. No rash noted. He is diaphoretic. No erythema. There is pallor.   Psychiatric: Judgment and thought content normal.   depressed    Lab Results   Component Value Date/Time    WBC 4.0* 08/07/2017 12:50 AM    HEMOGLOBIN 7.4* 08/07/2017 09:26 AM    HEMATOCRIT 23.0* 08/07/2017 09:26 AM    PLATELET COUNT 128* 08/07/2017 12:50 AM     Lab Results    Component Value Date/Time    SODIUM 134* 08/07/2017 12:50 AM    POTASSIUM 4.3 08/07/2017 12:50 AM    CHLORIDE 104 08/07/2017 12:50 AM    CO2 24 08/07/2017 12:50 AM    GLUCOSE 96 08/07/2017 12:50 AM    BUN 14 08/07/2017 12:50 AM    CREATININE 0.52 08/07/2017 12:50 AM      Assessment/Plan    * Prostate cancer metastatic to bone (CMS-HCC) (present on admission)  Assessment & Plan  Recent discharge on August 3 secondary to similar presentation with anemia status post 1 unit transfusion  H and H was still declining- transfuse 1unit PRBC (CMV neg and Irradiated)  Follows up on an as an outpatient with rad onc Dr Doss and oncologist Dr. Pirce- spoke with him and rec hemolysis workup and look for bleeding   Dr Doss had rec watching the new femur mets with no radiation suggested at this time- may need re-eval considered  Consult palliative care for advanced care planning patient is currently full code- they will cont to follow  Get him a cancer nurse navigator  Consult oncology again as needed  PT OT evaluation      Nausea (present on admission)  Assessment & Plan  Antiemetics as needed  Marinol if liver ok    Generalized weakness (present on admission)  Assessment & Plan  Multifactorial  Continue fluid hydration  Cont previous prednisone dosing 5mg BID- he was doing much better while on the steroid  PT/OT- may need rehab??    Hyponatremia (present on admission)  Assessment & Plan  Continue IV fluid hydration   may contribute to current presentation  Follow up a.m. labs    Anemia (present on admission)  Assessment & Plan  Likely d/t chronic disease, status post transfusion 4 days ago   Dr Price rec hemolysis lab eval- retic LDH, lisa- pending and he wants to be sure no other bleeding- CT abd/pelvis pending  Hgb declining- last 7.4- Transfuse CMV neg/Irradiated 1 unit PRBc  follow-up a.m. CBC    Depression (present on admission)  Assessment & Plan  Pt request depression eval  inpt psych consult requested, left message  with [phone]       EKG reviewed, Labs reviewed, Medications reviewed and Radiology images reviewed  Sanchez catheter: No Sanchez      DVT Prophylaxis: Enoxaparin (Lovenox)  DVT prophylaxis - mechanical: SCDs  Ulcer prophylaxis: Not indicated    Assessed for rehab: Patient was assess for and/or received rehabilitation services during this hospitalization

## 2017-08-07 NOTE — CONSULTS
"Reason for PC Consult: Advance Care Planning    Consulted by: Mey Harding DO    Assessment:  General: 56yo gentleman with anemia, OBS on CDU 8/6 with c/o weakness.  PMH: Stage IV prostate CA metastasis to bone (intial diagnosis 10 years ago, with elevated PSA one year ago, onc Dr. Price, rad onc Dr. Doss), 8/3 admission for anemia (1 U PRBC admin)    Dyspnea: No-    Last BM: 08/03/17-    Pain: Yes- sciatic  Depression: Yes- loss of pia/energy    Spiritual:  Is Spiritism or spirituality important for coping with this illness? No-    Has a  or spiritual provider visit been requested? No    Palliative Performance Scale: 50    Advanced Directive: None- pt/spouse think they have ADk, asked to provide, packet given  DPOA:  -    POLST:No-      Code Status: Full-      Outcome:  Introduced self, PC RN Caio, and role of Palliative Care to pt, spouse Diana, and dtr Mila (1yo granddtr Queta also present).  Assessed family's/pt's understanding of his current medical status, overall health picture, and options for future care.  Spouse is excellent historian.  Pt is awaiting next recommendations from Dr. Price and stated plan for full treatment.  Pt's greatest distress is from his extreme fatigue, loss of appetite, constipation, and depression.  Pt stated that those things that used to bring him pia, no longer do -- \"I didn't even go to my granddaughter's birthday.\"  Validated pt's thoughts and feelings regarding his stated depression -- discussed coping techniques like outdoor activity and being with family; as well as medical team looking at medication.    Pt not in particular pain except for sciatic at this time, but he stated that he generally uses only APAP as oxycodone makes him \"sick\" and tramadol gives him extreme nausea.    Pt's wife focused on pt's steroid regimen and subsequent taper, which she felt was too fast.  Spouse and pt report good deal of energy and activity on Prednisone 20mg (\"He was out fixing " "fences.\") which he was taking for three months; reduced to 10mg for seven days and then stopped with pt \"crashing\" in terms of energy, appetite and emotion.  Education provided on steroid use and side effects.      Explored family's/pt's values, beliefs, and preferences in order to identify GOC.  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  Pt's son Samuel helps/lives on the property.  Pt has three grandchildren and another due 9/24.  Support system also includes friends and neighbors.  Diana specifically expressed how difficult it is for her to reach out to others for support.  Validated Diana's thoughts and feelings while encouraging her to allow others to help so that she has more time being with pt/family.  Recommended cancer RN Navigator as additional resource -- order placed.    Detailed value of AD.  Pt stated that he would want his wife to be DPOA-HC and that he would not want to live on machines.  AD packet provided and family encouraged to have this difficult and open conversation in order to complete paperwork, but also ongoing through course of illness. Family verbalized understanding.    Pt's wife had expressed reticence about conversation (\"When you came in, I wanted to kick you out.\"), but at end expressed tearful appreciation.  Active listening, reflection, reminiscing, empathic support and therapeutic touch utilized throughout this encounter.  All questions answered.  PC contact information given.     Discussed with: RENATO Kaplan    Plan: PC to follow inpt onc, provide support, enc ACP discussion/documentation of EoL wishes    Thank you for allowing Palliative Care to participate in this patient's care. Please feel free to call [phone] with any questions or concerns.  "

## 2017-08-07 NOTE — PROGRESS NOTES
Pt sleeping comfortably, arouses easily, denies pain, nausea, or needs. Call light and personal possessions within reach, will continue to monitor.

## 2017-08-07 NOTE — PROGRESS NOTES
"Pt arrived to unit via lui at 1844. Pt oriented to room, unit, and plan of care. Admit profile complete; Wife at bedside; Upset with hospitalist. States,\" She scared us really bad, Wants us to have a palliative consult.\" Emotional support provided. Urine sample collected and sent; All questions answered at this time. Call light within reach; fall precautions in place.   "

## 2017-08-07 NOTE — PROGRESS NOTES
Pt assessment complete. Pt aaox4, no signs of distress noted at this time. POC discussed with pt and verbalizes no questions. Pt denies any additional needs at this time. Bed in lowest position and locked. Pt educated on fall risk and verbalized understanding. Call light and personal belongings within reach. Will continue to monitor.

## 2017-08-07 NOTE — ASSESSMENT & PLAN NOTE
Multifactorial  Continues to improve after blood transfusions.  Continue fluid hydration  Continue prednisone  PT/OT  May need home health at d/c.

## 2017-08-07 NOTE — H&P
Hospital Medicine History and Physical    Date of Service  8/6/2017    Chief Complaint  Chief Complaint   Patient presents with   • Weakness       History of Presenting Illness  57 y.o. male who presented 8/6/2017 with history of prostate cancer stage IV with bony metastases recent discharge on August 3 4 anemia requiring 1 unit PRBC transfusion. Patient did have improvement of weakness on discharge. Patient now returns with increasing generalized weakness with decreased oral intake secondary to loss of appetite. Patient denies any hematuria denies any melena. Patient denies any abdominal pain chest pain shortness of breath. Patient is noted to have pallor. Patient's wife is at bedside providing part of his history. Patient is normally ambulatory without a walker at baseline.    I have discussed with patient and wife regarding code status and advanced care planning at this time patient would like to continue full code status however is open to palliative care discussion.    Primary Care Physician  MANUEL Gauthier.    Consultants  Oncology Dr. Price please consult in the a.m.    Code Status  Full     Review of Systems  Review of Systems   Constitutional: Negative for fever and chills.   HENT: Negative for congestion and hearing loss.    Respiratory: Negative for cough and shortness of breath.    Cardiovascular: Negative for chest pain, palpitations and leg swelling.   Gastrointestinal: Negative for nausea, vomiting, abdominal pain, diarrhea and constipation.   Genitourinary: Negative for dysuria, frequency, hematuria and flank pain.   Musculoskeletal: Negative for myalgias, back pain, joint pain and falls.   Neurological: Positive for weakness. Negative for dizziness, sensory change, speech change, focal weakness and headaches.   Psychiatric/Behavioral: Negative for depression and memory loss. The patient is not nervous/anxious.           Past Medical History  Past Medical History   Diagnosis Date   •  Cancer (CMS-HCC)      prostate   • Breath shortness    • Claustrophobia        Surgical History  Past Surgical History   Procedure Laterality Date   • Prostatectomy, radial     • Recovery  2015     Procedure: CT-CT GUIDED LEFT LUNG BIOPSY**Per **;  Surgeon: [facility];  Location: SURGERY PRE-POST PROC UNIT [facility] [place];  Service:        Medications  No current facility-administered medications on file prior to encounter.     Current Outpatient Prescriptions on File Prior to Encounter   Medication Sig Dispense Refill   • alprazolam (XANAX) 0.5 MG Tab Take 0.5 mg by mouth every bedtime.     • Enzalutamide (XTANDI) 40 MG Cap Take 160 mg by mouth every evening.     • leuprolide (LUPRON) 22.5 MG Kit 22.5 mg by Intramuscular route Every 3 Months.         Family History  Family History   Problem Relation Age of Onset   • Cancer Father      lung cancer       Social History  Social History   Substance Use Topics   • Smoking status: Former Smoker -- 1.00 packs/day for 15 years     Types: Cigarettes     Quit date: 1985   • Smokeless tobacco: Current User   • Alcohol Use: No      Comment: none x 6 months       Allergies  No Known Allergies     Physical Exam  Laboratory   Hemodynamics  Temp (24hrs), Av.1 °C (96.9 °F), Min:35.8 °C (96.5 °F), Max:36.3 °C (97.3 °F)   Temperature: 36.3 °C (97.3 °F)  Pulse  Av.3  Min: 76  Max: 84 Heart Rate (Monitored): 78  Blood Pressure: 118/56 mmHg, NIBP: 127/72 mmHg      Respiratory      Respiration: 16, Pulse Oximetry: 97 %, O2 Daily Delivery Respiratory : Nasal Cannula        RUL Breath Sounds: Clear, RML Breath Sounds: Clear, RLL Breath Sounds: Diminished, CELIA Breath Sounds: Clear, LLL Breath Sounds: Diminished    Physical Exam   Constitutional: He is oriented to person, place, and time. He appears well-nourished. No distress.   HENT:   Head: Normocephalic and atraumatic.   Nose: Nose normal.   Eyes: EOM are normal. Pupils are equal, round, and  reactive to light. Right eye exhibits no discharge. Left eye exhibits no discharge.   Neck: Normal range of motion. Neck supple. No JVD present. No thyromegaly present.   Cardiovascular: Normal rate and intact distal pulses.    No murmur heard.  Pulmonary/Chest: Effort normal and breath sounds normal. No respiratory distress. He has no wheezes.   Abdominal: Soft. Bowel sounds are normal. He exhibits no distension. There is no tenderness.   Musculoskeletal: He exhibits tenderness. He exhibits no edema.   Neurological: He is alert and oriented to person, place, and time. No cranial nerve deficit. He exhibits normal muscle tone. Coordination normal.   Skin: Skin is warm and dry. No rash noted. He is not diaphoretic. No erythema. There is pallor.   Psychiatric: He has a normal mood and affect. His behavior is normal. Judgment and thought content normal.   Nursing note and vitals reviewed.      Recent Labs      08/06/17   1452   WBC  4.8   RBC  3.05*   HEMOGLOBIN  8.5*   HEMATOCRIT  25.8*   MCV  84.6   MCH  27.9   MCHC  32.9*   RDW  50.4*   PLATELETCT  144*   MPV  7.8*     Recent Labs      08/06/17   1452   SODIUM  130*   POTASSIUM  4.3   CHLORIDE  99   CO2  23   GLUCOSE  98   BUN  19   CREATININE  0.63   CALCIUM  8.9     Recent Labs      08/06/17   1452   ALTSGPT  44   ASTSGOT  94*   ALKPHOSPHAT  410*   TBILIRUBIN  0.7   GLUCOSE  98     Recent Labs      08/06/17   1452   INR  1.09     Recent Labs      08/06/17   1452   BNPBTYPENAT  8         Lab Results   Component Value Date    TROPONINI <0.01 08/06/2017       Imaging  X-ray with right humeral metastatic disease    Assessment/Plan     I anticipate this patient is appropriate for observation status at this time.    * Prostate cancer metastatic to bone (CMS-HCC)  Assessment & Plan  Recent discharge on August 3 secondary to similar presentation with anemia status post 1 unit transfusion  H and H appears stable now  Follows up on an as an outpatient with oncologist   Albert  We'll consult palliative care for advanced care planning patient is currently full code  Consult oncology as needed  PT OT evaluation    I discussed advance care planning with the patient's family for at least 26 minutes, including diagnosis, prognosis, plan of care, risks and benefits of any therapies that could be offered, as well as alternatives including palliation and hospice, as appropriate. Patient has been made a DNR now. BILL CODE 74939.    Nausea  Assessment & Plan  Antiemetics as needed   we'll place on Zofran around the clock    Generalized weakness  Assessment & Plan  Multifactorial  Continue fluid hydration    Hyponatremia  Assessment & Plan  Continue IV fluid hydration   may contribute to current presentation  Follow up a.m. labs    Anemia  Assessment & Plan  Of chronic disease status post transfusion 4 days ago   follow-up a.m. CBC        VTE prophylaxis: Lovenox .

## 2017-08-07 NOTE — ASSESSMENT & PLAN NOTE
Likely d/t chronic disease, status post transfusion 4 days ago   No evidence of overt bleeding  Hgb 10.9.    follow-up a.m. CBC  Oncology following

## 2017-08-07 NOTE — ASSESSMENT & PLAN NOTE
CT abdomen shows diffuse liver tres  Follows up on an as an outpatient with rad onc Dr Doss and oncologist Dr. Price  Palliative care following.  Oncology following.  Will trial the drug Juvtana, but will have to be done as outpatient as [facility] is unable to provide the medication.  Hgb stable.  Continue to follow labs.  PT/OT following

## 2017-08-08 ENCOUNTER — TELEPHONE (OUTPATIENT)
Dept: OTHER | Facility: MEDICAL CENTER | Age: 57
End: 2017-08-08

## 2017-08-08 LAB
ALBUMIN SERPL BCP-MCNC: 3 G/DL (ref 3.2–4.9)
ALBUMIN/GLOB SERPL: 1 G/DL
ALP SERPL-CCNC: 361 U/L (ref 30–99)
ALT SERPL-CCNC: 30 U/L (ref 2–50)
ANION GAP SERPL CALC-SCNC: 7 MMOL/L (ref 0–11.9)
AST SERPL-CCNC: 53 U/L (ref 12–45)
BILIRUB SERPL-MCNC: 0.7 MG/DL (ref 0.1–1.5)
BUN SERPL-MCNC: 11 MG/DL (ref 8–22)
CALCIUM SERPL-MCNC: 8.3 MG/DL (ref 8.5–10.5)
CHLORIDE SERPL-SCNC: 108 MMOL/L (ref 96–112)
CO2 SERPL-SCNC: 21 MMOL/L (ref 20–33)
CREAT SERPL-MCNC: 0.52 MG/DL (ref 0.5–1.4)
ERYTHROCYTE [DISTWIDTH] IN BLOOD BY AUTOMATED COUNT: 49.9 FL (ref 35.9–50)
GFR SERPL CREATININE-BSD FRML MDRD: >60 ML/MIN/1.73 M 2
GLOBULIN SER CALC-MCNC: 3 G/DL (ref 1.9–3.5)
GLUCOSE SERPL-MCNC: 98 MG/DL (ref 65–99)
HAPTOGLOB SERPL-MCNC: 312 MG/DL (ref 30–200)
HCT VFR BLD AUTO: 25.1 % (ref 42–52)
HGB BLD-MCNC: 8.3 G/DL (ref 14–18)
MCH RBC QN AUTO: 28.3 PG (ref 27–33)
MCHC RBC AUTO-ENTMCNC: 33.1 G/DL (ref 33.7–35.3)
MCV RBC AUTO: 85.7 FL (ref 81.4–97.8)
PLATELET # BLD AUTO: 121 K/UL (ref 164–446)
PMV BLD AUTO: 8.5 FL (ref 9–12.9)
POTASSIUM SERPL-SCNC: 4.4 MMOL/L (ref 3.6–5.5)
PROT SERPL-MCNC: 6 G/DL (ref 6–8.2)
RBC # BLD AUTO: 2.93 M/UL (ref 4.7–6.1)
SODIUM SERPL-SCNC: 136 MMOL/L (ref 135–145)
WBC # BLD AUTO: 4.4 K/UL (ref 4.8–10.8)

## 2017-08-08 PROCEDURE — 99232 SBSQ HOSP IP/OBS MODERATE 35: CPT | Performed by: INTERNAL MEDICINE

## 2017-08-08 PROCEDURE — G8978 MOBILITY CURRENT STATUS: HCPCS | Mod: CK

## 2017-08-08 PROCEDURE — 700102 HCHG RX REV CODE 250 W/ 637 OVERRIDE(OP): Performed by: PSYCHIATRY & NEUROLOGY

## 2017-08-08 PROCEDURE — G8979 MOBILITY GOAL STATUS: HCPCS | Mod: CI

## 2017-08-08 PROCEDURE — 85027 COMPLETE CBC AUTOMATED: CPT

## 2017-08-08 PROCEDURE — 700105 HCHG RX REV CODE 258: Performed by: INTERNAL MEDICINE

## 2017-08-08 PROCEDURE — 700111 HCHG RX REV CODE 636 W/ 250 OVERRIDE (IP): Performed by: NURSE PRACTITIONER

## 2017-08-08 PROCEDURE — 700111 HCHG RX REV CODE 636 W/ 250 OVERRIDE (IP): Performed by: INTERNAL MEDICINE

## 2017-08-08 PROCEDURE — 700102 HCHG RX REV CODE 250 W/ 637 OVERRIDE(OP): Performed by: INTERNAL MEDICINE

## 2017-08-08 PROCEDURE — 36415 COLL VENOUS BLD VENIPUNCTURE: CPT

## 2017-08-08 PROCEDURE — 80053 COMPREHEN METABOLIC PANEL: CPT

## 2017-08-08 PROCEDURE — A9270 NON-COVERED ITEM OR SERVICE: HCPCS | Performed by: INTERNAL MEDICINE

## 2017-08-08 PROCEDURE — 97162 PT EVAL MOD COMPLEX 30 MIN: CPT

## 2017-08-08 PROCEDURE — 700102 HCHG RX REV CODE 250 W/ 637 OVERRIDE(OP): Performed by: NURSE PRACTITIONER

## 2017-08-08 PROCEDURE — A9270 NON-COVERED ITEM OR SERVICE: HCPCS | Performed by: NURSE PRACTITIONER

## 2017-08-08 PROCEDURE — 770004 HCHG ROOM/CARE - ONCOLOGY PRIVATE *

## 2017-08-08 PROCEDURE — 96361 HYDRATE IV INFUSION ADD-ON: CPT

## 2017-08-08 PROCEDURE — A9270 NON-COVERED ITEM OR SERVICE: HCPCS | Performed by: PSYCHIATRY & NEUROLOGY

## 2017-08-08 RX ORDER — KETOROLAC TROMETHAMINE 30 MG/ML
30 INJECTION, SOLUTION INTRAMUSCULAR; INTRAVENOUS EVERY 6 HOURS PRN
Status: DISCONTINUED | OUTPATIENT
Start: 2017-08-08 | End: 2017-08-12 | Stop reason: HOSPADM

## 2017-08-08 RX ORDER — TRAMADOL HYDROCHLORIDE 50 MG/1
50 TABLET ORAL EVERY 6 HOURS PRN
Status: DISCONTINUED | OUTPATIENT
Start: 2017-08-08 | End: 2017-08-12 | Stop reason: HOSPADM

## 2017-08-08 RX ADMIN — METHYLPHENIDATE HYDROCHLORIDE 5 MG: 5 TABLET ORAL at 15:42

## 2017-08-08 RX ADMIN — METHYLPHENIDATE HYDROCHLORIDE 5 MG: 5 TABLET ORAL at 09:37

## 2017-08-08 RX ADMIN — STANDARDIZED SENNA CONCENTRATE AND DOCUSATE SODIUM 2 TABLET: 8.6; 5 TABLET, FILM COATED ORAL at 08:24

## 2017-08-08 RX ADMIN — KETOROLAC TROMETHAMINE 30 MG: 30 INJECTION, SOLUTION INTRAMUSCULAR at 13:18

## 2017-08-08 RX ADMIN — ACETAMINOPHEN 650 MG: 325 TABLET, FILM COATED ORAL at 18:55

## 2017-08-08 RX ADMIN — ACETAMINOPHEN 650 MG: 325 TABLET, FILM COATED ORAL at 08:23

## 2017-08-08 RX ADMIN — PROMETHAZINE HYDROCHLORIDE 25 MG: 25 TABLET ORAL at 13:18

## 2017-08-08 RX ADMIN — TRAMADOL HYDROCHLORIDE 50 MG: 50 TABLET, COATED ORAL at 09:37

## 2017-08-08 RX ADMIN — SODIUM CHLORIDE: 9 INJECTION, SOLUTION INTRAVENOUS at 19:18

## 2017-08-08 RX ADMIN — SODIUM CHLORIDE: 9 INJECTION, SOLUTION INTRAVENOUS at 04:11

## 2017-08-08 RX ADMIN — PREDNISONE 5 MG: 5 TABLET ORAL at 20:21

## 2017-08-08 RX ADMIN — PREDNISONE 5 MG: 5 TABLET ORAL at 08:24

## 2017-08-08 ASSESSMENT — PAIN SCALES - GENERAL
PAINLEVEL_OUTOF10: 6
PAINLEVEL_OUTOF10: 3
PAINLEVEL_OUTOF10: 6
PAINLEVEL_OUTOF10: 6

## 2017-08-08 ASSESSMENT — ENCOUNTER SYMPTOMS
DIZZINESS: 0
SPUTUM PRODUCTION: 0
BRUISES/BLEEDS EASILY: 0
BLOOD IN STOOL: 0
FEVER: 0
COUGH: 0
RESPIRATORY NEGATIVE: 1
SENSORY CHANGE: 0
CONSTIPATION: 1
CHILLS: 0
SHORTNESS OF BREATH: 0
BACK PAIN: 1
VOMITING: 0
NAUSEA: 0
WEAKNESS: 1
DIARRHEA: 0
BLURRED VISION: 0
MYALGIAS: 1
HEADACHES: 1
ABDOMINAL PAIN: 0
CARDIOVASCULAR NEGATIVE: 1
TINGLING: 0
ROS SKIN COMMENTS: PALE
FALLS: 0
WEIGHT LOSS: 1
EYES NEGATIVE: 1
DEPRESSION: 1
FOCAL WEAKNESS: 0

## 2017-08-08 ASSESSMENT — COGNITIVE AND FUNCTIONAL STATUS - GENERAL
SUGGESTED CMS G CODE MODIFIER MOBILITY: CK
STANDING UP FROM CHAIR USING ARMS: A LITTLE
TURNING FROM BACK TO SIDE WHILE IN FLAT BAD: A LITTLE
CLIMB 3 TO 5 STEPS WITH RAILING: A LOT
WALKING IN HOSPITAL ROOM: A LITTLE
MOVING TO AND FROM BED TO CHAIR: A LITTLE
MOVING FROM LYING ON BACK TO SITTING ON SIDE OF FLAT BED: A LITTLE
MOBILITY SCORE: 17

## 2017-08-08 ASSESSMENT — GAIT ASSESSMENTS
DISTANCE (FEET): 150
DEVIATION: BRADYKINETIC
ASSISTIVE DEVICE: FRONT WHEEL WALKER
GAIT LEVEL OF ASSIST: CONTACT GUARD ASSIST

## 2017-08-08 NOTE — THERAPY
"Occupational Therapy Evaluation completed.   Functional Status:  Pt asleep on arrival, wife present & very supportive.  Pt reports significant loss of energy & constant fatigue with little appetite.  Pt was CGA for supine to sit EOB.  Pt had good AROM in BUE & 4/5 strength.  Pt stood with CGA but pt reported some dizziness & poor tolerance in standing.  Pt requested to lay back down after sitting for 12 min due to increased sciatic nerve pain down his LLE.  Pt verbalized frustration with his current physical decline.  Plan of Care: Will benefit from Occupational Therapy 3 times per week  Discharge Recommendations:  Equipment: Will Continue to Assess for Equipment Needs. Post-acute therapy Discharge to home with outpatient or home health for additional skilled therapy services.    Pt has good family support at home from his wife, son & daughter.  Pt may need to transition to staying on the first floor in their home if weakness & fatigue continues.    See \"Rehab Therapy-Acute\" Patient Summary Report for complete documentation.    "

## 2017-08-08 NOTE — PROGRESS NOTES
Pt aao x 4, fatigued. Yary. IVF infusing. Prn pain med given per mar for leg pain. Poc discussed with pt. Call light within reach. Instructed pt to use call light for assistance. Hourly rounding in use
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
## 2017-08-08 NOTE — PROGRESS NOTES
Pt's wife asked for a cot but asked to not set it up yet because they're expecting visitors. Set it aside inside the bathroom with the sheets.

## 2017-08-08 NOTE — TELEPHONE ENCOUNTER
Telephone call to pt's wife.  Pt is currently in CDU and she thinks he will not be going to rehab.  States he has liver mets and will be starting chemotherapy in the hospital.  Support & services offered.  No needs verbalized at this time. Encouraged to call with needs or questions.  Active listening and emotional support provided.

## 2017-08-08 NOTE — PROGRESS NOTES
Pt states Toradol made him extremely drowsy. Pt resting in bed. Pt given fresh water. Pt denies any additional needs at this time. Will continue to monitor.

## 2017-08-08 NOTE — THERAPY
"Pt is a 58 y/o male presenting to physical therapy s/p recent onset sciatic (L Radicular) pain, and generalized weakness/fatigue. Pt with hx of stage IV prostate CA, mets to spine (T8 which pt stated was radiated), recent mets found on liver. Pt and spouse report signficant decline in function over past month. Currenlty, pt requires CGA or less for all mobility; however, is notably deconditioned and weak due to limited mobility PTA. Pt sciatic symptoms appeared to slowly centralize with seated anterior pelvic tilt to facilitated greater lumbar extension. Ambulation challenging due to fatigue and B LE weakness, L > R. Provided pt with exercise handout for seated anterior pelvic tilts (\"pelvic rocking\") and goals for mobility. Discussed with pt and spouse PLOF, CLOF, and goals to maintain strength and mobility. Pt expressed frustration by recent decline. PT to follow while in house.     Physical Therapy Evaluation completed.   Bed Mobility:  Supine to Sit: Stand by Assist  Transfers: Sit to Stand: Contact Guard Assist  Gait: Level Of Assist: Contact Guard Assist with Front-Wheel Walker       Plan of Care: Will benefit from Physical Therapy 3 times per week  Discharge Recommendations: Equipment: Will Continue to Assess for Equipment Needs. Post-acute therapy: TBD, may require placement prior to D/C home depending on medical POC and progress made in acute setting.  Goal would be to return home with spouse to assist      See \"Rehab Therapy-Acute\" Patient Summary Report for complete documentation.     "

## 2017-08-08 NOTE — NON-PROVIDER
"PSYCHIATRIC FOLLOW UP:    Reason for Admission: Weakness    Legal hold status:  -   Psychiatric Supervising Attending:   Jennifer Wolfe MD      HPI:  hRys Yang is a 57 year old male with PMH of Stage IV prostate cancer admitted for observation for weakness. Since yesterday, patient has discovered that his cancer has spread to his liver. Patient is still hopeful that chemotherapy will be able to help him fight it. Patient states that he is \"pissed off\" but needs to be to give him the strength to find the cancer. Patient's wife was present and expressed concern over the fact that the cancer in his liver was on a CT from months ago and that it was never found. Patient's wife is crying and expresses disbelief that she didn't realize it had spread sooner.    Patient states that he is doing better today on Ritalin. Patient states that he has more energy and is awake at a time when he would normally be sleeping. Patient states that he had tramadol this morning for pain, so that is quite sedating.         Psychiatric Examination: observed phenomenon:  Vitals: Blood pressure 152/86, pulse 91, temperature 35.9 °C (96.7 °F), resp. rate 16, height 1.753 m (5' 9\"), weight 87.7 kg (193 lb 5.5 oz), SpO2 96 %.  Musculoskeletal: normal psychomotor activity, no tics or unusual mannerisms noted  Appearance: WDWN, appropriate dress and grooming. Behavior is calm, cooperative,  appropriate eye contact  Thoughts:  Linear, logical, goal oriented, no blocking of thought noted, no delusional content noted, not obviously responding to internal stimuli.  Speech: Normal rate and tatyana, no pressuring of speech noted  Mood:  \"Better.\"    Affect: Mood congruent, normal range of affect          SI/HI: denies, no evidence of active SI/HI noted   Attention/Alertness: Alert  Memory: Recent and remote memory intact     Orientation: A&Ox3     Fund of Knowledge: Normal, able to perform simple abstraction     Insight/Judgement into symptoms: "   Neurological Testing:( ie clock, cube drawing, MMSE, MOCA,etc.)    Medical systems reviewed: (pain, new complaint, etc)           Lab results/tests:   Recent Results (from the past 24 hour(s))   CBC WITHOUT DIFFERENTIAL    Collection Time: 08/08/17  4:10 AM   Result Value Ref Range    WBC 4.4 (L) 4.8 - 10.8 K/uL    RBC 2.93 (L) 4.70 - 6.10 M/uL    Hemoglobin 8.3 (L) 14.0 - 18.0 g/dL    Hematocrit 25.1 (L) 42.0 - 52.0 %    MCV 85.7 81.4 - 97.8 fL    MCH 28.3 27.0 - 33.0 pg    MCHC 33.1 (L) 33.7 - 35.3 g/dL    RDW 49.9 35.9 - 50.0 fL    Platelet Count 121 (L) 164 - 446 K/uL    MPV 8.5 (L) 9.0 - 12.9 fL   COMP METABOLIC PANEL    Collection Time: 08/08/17  4:10 AM   Result Value Ref Range    Sodium 136 135 - 145 mmol/L    Potassium 4.4 3.6 - 5.5 mmol/L    Chloride 108 96 - 112 mmol/L    Co2 21 20 - 33 mmol/L    Anion Gap 7.0 0.0 - 11.9    Glucose 98 65 - 99 mg/dL    Bun 11 8 - 22 mg/dL    Creatinine 0.52 0.50 - 1.40 mg/dL    Calcium 8.3 (L) 8.5 - 10.5 mg/dL    AST(SGOT) 53 (H) 12 - 45 U/L    ALT(SGPT) 30 2 - 50 U/L    Alkaline Phosphatase 361 (H) 30 - 99 U/L    Total Bilirubin 0.7 0.1 - 1.5 mg/dL    Albumin 3.0 (L) 3.2 - 4.9 g/dL    Total Protein 6.0 6.0 - 8.2 g/dL    Globulin 3.0 1.9 - 3.5 g/dL    A-G Ratio 1.0 g/dL   ESTIMATED GFR    Collection Time: 08/08/17  4:10 AM   Result Value Ref Range    GFR If African American >60 >60 mL/min/1.73 m 2    GFR If Non African American >60 >60 mL/min/1.73 m 2          Assessment:(acuity level)  #Chemotherapy Associated Fatigue  #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        Plan:  Patient's Ritalin will be continued. Follow-up with patient on Thursday to note side effects, if any, and a possible dose increase.  legal hold: -  Anticipated F/U: within 48 hours.  Discussed with other provider: Dr. Wolfe  Will follow.

## 2017-08-08 NOTE — PALLIATIVE CARE
Palliative Care follow-up  Brief visit with patient and spouse.  Patient awake, alert and oriented.  Sitting up with glasses on and engaging in conversation.  Still feels tired but better today. 1 unit of PRBC given yesterday.  Wife better today as well.  She was able to sleep and eat yesterday afternoon.  They declined further needs at this time.  Palliative Care will follow.          Thank you for allowing Palliative Care to participate in this patient's care. Please feel free to call [phone] with any questions or concerns.

## 2017-08-08 NOTE — PROGRESS NOTES
Hospital Medicine Interval Note  Date of Service:  8/8/2017    Chief Complaint  57 y.o.-year-old male with history of prostate cancer stage IV with bony metastases recent discharge on August 3 for anemia requiring 1 unit PRBC transfusion. Patient admitted with increased weakness and loss of appetite likely secondary to chronic disease state.    Interval Problem Update  8/8 - Weakness slightly improved after blood transfusion.  Hgb 8.3.  Continues to report decreased appetite.  CT abdomen shows diffuse liver tres.  VSS.  Afebrile.  Working with PT/OT.    Consultants/Specialty  Oncology - Dr. Vega.  Palliative care  Psychiatry - Dr. Hsu.    Disposition  Continue to trend Hgb.  Continue PT/OT.  Oncology following.  Will transfer to oncology floor.  May require SNF at d/c.     Review of Systems   Constitutional: Positive for weight loss and malaise/fatigue. Negative for fever and chills. Diaphoresis: 2/2 medication.   HENT: Negative for congestion.    Eyes: Negative.  Negative for blurred vision.   Respiratory: Negative.  Negative for cough, sputum production and shortness of breath.    Cardiovascular: Negative.  Negative for chest pain and leg swelling.   Gastrointestinal: Positive for constipation (3 days). Negative for nausea, vomiting, abdominal pain, diarrhea and blood in stool.   Genitourinary: Negative.  Negative for dysuria and urgency.   Musculoskeletal: Positive for myalgias, back pain and joint pain (right arm/shoulder). Negative for falls.   Skin: Negative.  Negative for itching.        pale   Neurological: Positive for weakness and headaches. Negative for dizziness, tingling, sensory change and focal weakness.   Endo/Heme/Allergies: Negative.  Does not bruise/bleed easily.        Having anemia requiring transfusion    Psychiatric/Behavioral: Positive for depression (requests pysch consult- severe doesn't want to eat, see grandkids, do anything).      Physical Exam Laboratory/Imaging   Filed Vitals:     08/07/17 2342 08/08/17 0400 08/08/17 0837 08/08/17 1239   BP: 139/65 138/69 146/79 152/86   Pulse: 90 84 84 91   Temp: 36.1 °C (97 °F) 36.4 °C (97.6 °F) 36.4 °C (97.6 °F) 35.9 °C (96.7 °F)   Resp: 17 16 16 16   Height:       Weight:       SpO2: 97% 97% 96% 96%     Physical Exam   Constitutional: He is oriented to person, place, and time. He appears well-developed and well-nourished. No distress.   HENT:   Head: Normocephalic and atraumatic.   Mouth/Throat: No oropharyngeal exudate.   Eyes: Conjunctivae are normal. Right eye exhibits no discharge. Left eye exhibits no discharge.   Neck: Normal range of motion. Neck supple. No JVD present. No tracheal deviation present.   Cardiovascular: Normal rate, regular rhythm, normal heart sounds and intact distal pulses.    No murmur heard.  Pulmonary/Chest: Effort normal and breath sounds normal. No respiratory distress. He has no wheezes.   occ dec in O2 sats to 89% when sleeping   Abdominal: Soft. Bowel sounds are normal. He exhibits no distension. There is no tenderness.   Musculoskeletal: He exhibits tenderness. He exhibits no edema.   Generalized weakness.   Neurological: He is alert and oriented to person, place, and time.   Skin: Skin is warm. He is diaphoretic. No erythema. There is pallor.   Psychiatric: Judgment and thought content normal.   depressed    Lab Results   Component Value Date/Time    WBC 4.4* 08/08/2017 04:10 AM    HEMOGLOBIN 8.3* 08/08/2017 04:10 AM    HEMATOCRIT 25.1* 08/08/2017 04:10 AM    PLATELET COUNT 121* 08/08/2017 04:10 AM     Lab Results   Component Value Date/Time    SODIUM 136 08/08/2017 04:10 AM    POTASSIUM 4.4 08/08/2017 04:10 AM    CHLORIDE 108 08/08/2017 04:10 AM    CO2 21 08/08/2017 04:10 AM    GLUCOSE 98 08/08/2017 04:10 AM    BUN 11 08/08/2017 04:10 AM    CREATININE 0.52 08/08/2017 04:10 AM      Assessment/Plan    * Prostate cancer metastatic to bone (CMS-HCC) (present on admission)  Assessment & Plan  S/P 1 unit PRBC transfusion    CT abdomen shows diffuse liver tres  Follows up on an as an outpatient with rad onc Dr Doss and oncologist Dr. Price  Oncology consulted.  May require inpatient chemo.  Palliative care following.  Transfer to oncology.  Consult oncology again as needed  PT/OT following      Nausea (present on admission)  Assessment & Plan  Currently not complaining of nausea.  Antiemetics as needed      Generalized weakness (present on admission)  Assessment & Plan  Multifactorial  Slightly improved after blood transfusion  Continue fluid hydration  Continue prednisone    Hyponatremia (present on admission)  Assessment & Plan  Resolved.  Continue IV hydration.    Anemia (present on admission)  Assessment & Plan  Likely d/t chronic disease, status post transfusion 4 days ago   No evidence of overt bleeding  LDH and reticulocyte count elevated  S/P PRBC transfusion  Hgb improved to 8.3 today  follow-up a.m. CBC  Oncology following    Depression (present on admission)  Assessment & Plan  Ritalin started  Psych following       EKG reviewed, Labs reviewed, Medications reviewed and Radiology images reviewed  Sanchez catheter: No Sanchez      DVT Prophylaxis: Enoxaparin (Lovenox)  DVT prophylaxis - mechanical: SCDs  Ulcer prophylaxis: Not indicated    Assessed for rehab: Patient was assess for and/or received rehabilitation services during this hospitalization

## 2017-08-09 LAB
ALBUMIN SERPL BCP-MCNC: 3 G/DL (ref 3.2–4.9)
ALBUMIN/GLOB SERPL: 0.9 G/DL
ALP SERPL-CCNC: 374 U/L (ref 30–99)
ALT SERPL-CCNC: 33 U/L (ref 2–50)
ANION GAP SERPL CALC-SCNC: 8 MMOL/L (ref 0–11.9)
AST SERPL-CCNC: 46 U/L (ref 12–45)
BILIRUB SERPL-MCNC: 0.6 MG/DL (ref 0.1–1.5)
BUN SERPL-MCNC: 9 MG/DL (ref 8–22)
CALCIUM SERPL-MCNC: 8.6 MG/DL (ref 8.5–10.5)
CHLORIDE SERPL-SCNC: 103 MMOL/L (ref 96–112)
CO2 SERPL-SCNC: 20 MMOL/L (ref 20–33)
CREAT SERPL-MCNC: 0.46 MG/DL (ref 0.5–1.4)
ERYTHROCYTE [DISTWIDTH] IN BLOOD BY AUTOMATED COUNT: 48.9 FL (ref 35.9–50)
GFR SERPL CREATININE-BSD FRML MDRD: >60 ML/MIN/1.73 M 2
GLOBULIN SER CALC-MCNC: 3.2 G/DL (ref 1.9–3.5)
GLUCOSE SERPL-MCNC: 94 MG/DL (ref 65–99)
HCT VFR BLD AUTO: 24.1 % (ref 42–52)
HGB BLD-MCNC: 7.8 G/DL (ref 14–18)
MCH RBC QN AUTO: 27.5 PG (ref 27–33)
MCHC RBC AUTO-ENTMCNC: 32.4 G/DL (ref 33.7–35.3)
MCV RBC AUTO: 84.9 FL (ref 81.4–97.8)
PLATELET # BLD AUTO: 127 K/UL (ref 164–446)
PMV BLD AUTO: 8.3 FL (ref 9–12.9)
POTASSIUM SERPL-SCNC: 3.8 MMOL/L (ref 3.6–5.5)
PROT SERPL-MCNC: 6.2 G/DL (ref 6–8.2)
RBC # BLD AUTO: 2.84 M/UL (ref 4.7–6.1)
SODIUM SERPL-SCNC: 131 MMOL/L (ref 135–145)
WBC # BLD AUTO: 4 K/UL (ref 4.8–10.8)

## 2017-08-09 PROCEDURE — 80053 COMPREHEN METABOLIC PANEL: CPT

## 2017-08-09 PROCEDURE — 700102 HCHG RX REV CODE 250 W/ 637 OVERRIDE(OP): Performed by: INTERNAL MEDICINE

## 2017-08-09 PROCEDURE — 36415 COLL VENOUS BLD VENIPUNCTURE: CPT

## 2017-08-09 PROCEDURE — 36430 TRANSFUSION BLD/BLD COMPNT: CPT

## 2017-08-09 PROCEDURE — 99232 SBSQ HOSP IP/OBS MODERATE 35: CPT | Performed by: INTERNAL MEDICINE

## 2017-08-09 PROCEDURE — A9270 NON-COVERED ITEM OR SERVICE: HCPCS | Performed by: PSYCHIATRY & NEUROLOGY

## 2017-08-09 PROCEDURE — P9016 RBC LEUKOCYTES REDUCED: HCPCS | Mod: 91

## 2017-08-09 PROCEDURE — 700102 HCHG RX REV CODE 250 W/ 637 OVERRIDE(OP): Performed by: NURSE PRACTITIONER

## 2017-08-09 PROCEDURE — A9270 NON-COVERED ITEM OR SERVICE: HCPCS | Performed by: INTERNAL MEDICINE

## 2017-08-09 PROCEDURE — A9270 NON-COVERED ITEM OR SERVICE: HCPCS | Performed by: NURSE PRACTITIONER

## 2017-08-09 PROCEDURE — 96361 HYDRATE IV INFUSION ADD-ON: CPT

## 2017-08-09 PROCEDURE — 700111 HCHG RX REV CODE 636 W/ 250 OVERRIDE (IP): Performed by: INTERNAL MEDICINE

## 2017-08-09 PROCEDURE — 700105 HCHG RX REV CODE 258: Performed by: INTERNAL MEDICINE

## 2017-08-09 PROCEDURE — 85027 COMPLETE CBC AUTOMATED: CPT

## 2017-08-09 PROCEDURE — 770004 HCHG ROOM/CARE - ONCOLOGY PRIVATE *

## 2017-08-09 PROCEDURE — 700102 HCHG RX REV CODE 250 W/ 637 OVERRIDE(OP): Performed by: PSYCHIATRY & NEUROLOGY

## 2017-08-09 PROCEDURE — 86644 CMV ANTIBODY: CPT | Mod: 91

## 2017-08-09 PROCEDURE — 86923 COMPATIBILITY TEST ELECTRIC: CPT | Mod: 91

## 2017-08-09 RX ADMIN — PREDNISONE 5 MG: 5 TABLET ORAL at 08:19

## 2017-08-09 RX ADMIN — SODIUM CHLORIDE: 9 INJECTION, SOLUTION INTRAVENOUS at 04:46

## 2017-08-09 RX ADMIN — STANDARDIZED SENNA CONCENTRATE AND DOCUSATE SODIUM 2 TABLET: 8.6; 5 TABLET, FILM COATED ORAL at 08:19

## 2017-08-09 RX ADMIN — PROMETHAZINE HYDROCHLORIDE 25 MG: 25 TABLET ORAL at 10:50

## 2017-08-09 RX ADMIN — METHYLPHENIDATE HYDROCHLORIDE 5 MG: 5 TABLET ORAL at 08:19

## 2017-08-09 RX ADMIN — ACETAMINOPHEN 650 MG: 325 TABLET, FILM COATED ORAL at 04:45

## 2017-08-09 RX ADMIN — PREDNISONE 5 MG: 5 TABLET ORAL at 20:29

## 2017-08-09 RX ADMIN — PROMETHAZINE HYDROCHLORIDE 25 MG: 25 TABLET ORAL at 15:02

## 2017-08-09 RX ADMIN — TRAMADOL HYDROCHLORIDE 50 MG: 50 TABLET, COATED ORAL at 10:50

## 2017-08-09 ASSESSMENT — ENCOUNTER SYMPTOMS
MYALGIAS: 1
COUGH: 0
TREMORS: 0
ABDOMINAL PAIN: 0
BLOOD IN STOOL: 0
STRIDOR: 0
PALPITATIONS: 0
HEADACHES: 1
WEIGHT LOSS: 1
DIZZINESS: 0
CHILLS: 0
NECK PAIN: 0
SENSORY CHANGE: 0
DIARRHEA: 0
DEPRESSION: 1
CONSTIPATION: 1
BLURRED VISION: 0
FOCAL WEAKNESS: 0
FEVER: 0
BRUISES/BLEEDS EASILY: 0
SORE THROAT: 0
RESPIRATORY NEGATIVE: 1
SHORTNESS OF BREATH: 0
DIAPHORESIS: 0
CARDIOVASCULAR NEGATIVE: 1
DOUBLE VISION: 0
BACK PAIN: 1
TINGLING: 0
WEAKNESS: 1
ROS SKIN COMMENTS: PALE
EYES NEGATIVE: 1

## 2017-08-09 ASSESSMENT — PAIN SCALES - GENERAL
PAINLEVEL_OUTOF10: 0
PAINLEVEL_OUTOF10: 4
PAINLEVEL_OUTOF10: 5
PAINLEVEL_OUTOF10: 0

## 2017-08-09 ASSESSMENT — COPD QUESTIONNAIRES
DO YOU EVER COUGH UP ANY MUCUS OR PHLEGM?: NO/ONLY WITH OCCASIONAL COLDS OR INFECTIONS
COPD SCREENING SCORE: 1
HAVE YOU SMOKED AT LEAST 100 CIGARETTES IN YOUR ENTIRE LIFE: NO/DON'T KNOW
DURING THE PAST 4 WEEKS HOW MUCH DID YOU FEEL SHORT OF BREATH: NONE/LITTLE OF THE TIME

## 2017-08-09 NOTE — DISCHARGE PLANNING
Care Transition Team Assessment    Information Source  Orientation : Oriented x 4  Information Given By: Patient         Elopement Risk  Legal Hold: No  Ambulatory or Self Mobile in Wheelchair: Yes  Disoriented: No  Psychiatric Symptoms: None  History of Wandering: No  Elopement this Admit: No  Vocalizing Wanting to Leave: No  Displays Behaviors, Body Language Wanting to Leave: No-Not at Risk for Elopement  Elopement Risk: Not at Risk for Elopement    Interdisciplinary Discharge Planning  Does Admitting Nurse Feel This Could be a Complex Discharge?: No  Primary Care Physician: Dr. Singh  Lives with - Patient's Self Care Capacity: Spouse  Patient or legal guardian wants to designate a caregiver (see row info): No  Support Systems: Family Member(s), Friends / Neighbors  Housing / Facility: [address]  Do You Take your Prescribed Medications Regularly: Yes  Able to Return to Previous ADL's: Yes  Mobility Issues: No  Prior Services: Home-Independent  Patient Expects to be Discharged to:: Home  Assistance Needed: No  Durable Medical Equipment: Not Applicable                   Vision / Hearing Impairment  Vision Impairment : Yes  Right Eye Vision: Wears Glasses  Left Eye Vision: Wears Glasses  Hearing Impairment : Yes  Hearing Impairment: Both Ears, Hearing Device(s) Available    Values / Beliefs / Concerns  Values / Beliefs Concerns : No         Domestic Abuse  Have you ever been the victim of abuse or violence?: No  Physical Abuse or Sexual Abuse: No  Verbal Abuse or Emotional Abuse: No  Possible Abuse Reported to:: Not Applicable

## 2017-08-09 NOTE — CONSULTS
DATE OF SERVICE:  08/09/2017    CHIEF COMPLAINT:  1.  Metastatic prostate cancer.  2.  Profound weakness.    HISTORY:  The patient is a very pleasant 57-year-old gentleman with metastatic   prostate cancer. He had a prostatectomy some 10 years ago with a rise in his   PSA, he required salvage radiation to the prostatic bed.  He has had   metastatic disease for about 2 years.  He has been treated with radiation   therapy to a thoracic vertebral body back in early 2016.  The patient had 6   cycles of Taxotere.  He has been on Lupron.  He has been through [facility].    Lately, he has had a rising PSA.  He has been on Xtandi for about the last   month, but it does not appear that he is responding to it.  He comes in the   hospital with profound weakness.  He is anemic.  He has elevated liver   function studies, although his bilirubin is normal.  His SGPT is normal.  His   albumin is low.  Hemoglobin is 7.8 today.  He got 1 unit of blood.  We are   going to give another.  He has a marked elevation in LDH.  He has a TSH, which   is normal.  He does not appear to be hemolyzing.  His retic count is only   2.7.    The patient has an abdominal CAT scan which shows innumerable liver   metastases.  He has mild splenomegaly.  There is a question of some nodules in   the lung.    PAST MEDICAL HISTORY:  Pertinent for primarily the prostate cancer.    PAST SURGICAL HISTORY:  Includes the prostatectomy.  He has had a lung biopsy.    RISK FACTORS:  He is a former smoker.    SOCIAL HISTORY:  The patient is .  He lives with his spouse.  He has 2   children.  In the past, he has had occasional alcohol intake, but not lately.    REVIEW OF SYSTEMS:  He has no fevers or chills.  He has profound fatigue.  He   can walk.  He does have some sciatica.  He has no rashes.  He has no major   shortness of breath.  He really has no abdominal pain.  He has no right upper   quadrant discomfort.  He has no neurologic impairment.  He has no  extremity   swelling.    PHYSICAL EXAMINATION:  VITAL SIGNS:  Current blood pressure is 150/82, pulse 85, respirations 16,   temperature 36.9, sats 96%.  HEENT:  He has no oral lesions.  He has a sallow appearing mimicking being   jaundiced.  He has no prominent adenopathy.  LUNGS:  Clear.  CARDIAC:  Benign.  ABDOMEN:  Soft.  His abdomen is somewhat distended.  I do not think he has   ascites.  I really cannot feel his liver edge.  He has no rebound.  He has no   guarding.  EXTREMITIES:  Grossly benign.  He has trace edema.  NEUROLOGIC:  Nonfocal.    IMPRESSION:  Dr. Price is checking to see if the patient could respond to move   immunotherapy, but at the end of the day that a very slow response.  The main   thing the patient needs is systemic chemotherapy.  He is going to do anything   at all.  The patient is not so sure he wants to go in that direction.  He has   had chemo in the past.  He has never had a drug called Jevtana.  I am   checking to see if that can even be given in the hospital.  If not, it could   be given in our office, although the patient is a little bit reluctant to go   home because of his significant fatigue.  I did briefly discuss hospice with   the patient, he has been committed himself one way or another.  We will try to   make some decisions here in the next 24 hours.  The patient has been seen by   Dr. Doss.       ____________________________________     F MD FLEX RIVERA / MAR[facility]ALL    DD:  08/09/2017 14:08:15  DT:  08/09/2017 15:20:37    D#:  [id number]  Job#:  [id number]

## 2017-08-09 NOTE — PROGRESS NOTES
pt aao x 4. Yary. Denies pain upon assessment. IVf infusing. Up sba. Assessment completed. Friend at bedside. Poc discussed with pt. Call light within reach. Instructed pt to use call light for assistance. Hourly rounding in use

## 2017-08-09 NOTE — PROGRESS NOTES
Hospital Medicine Interval Note  Date of Service:  8/9/2017    Chief Complaint  57 y.o.-year-old male with history of prostate cancer stage IV with bony metastases recent discharge on August 3 for anemia requiring 1 unit PRBC transfusion. Patient admitted with increased weakness and loss of appetite likely secondary to chronic disease state.    Interval Problem Update  8/9 - Weakness slightly improved.  Reports having an appetite this am.  Hgb 7.8.  Received 1 unit PRBC.  Continue PT/OT.    Consultants/Specialty  Oncology - Dr. Vega.  Palliative care  Psychiatry - Dr. Hsu.    Disposition  Continue to trend Hgb.  Continue PT/OT.  Oncology following.  Will transfer to oncology floor.  May require SNF at d/c.     Review of Systems   Constitutional: Positive for weight loss and malaise/fatigue. Negative for fever, chills and diaphoresis (2/2 medication).   HENT: Negative for sore throat.    Eyes: Negative.  Negative for blurred vision and double vision.   Respiratory: Negative.  Negative for cough, shortness of breath and stridor.    Cardiovascular: Negative.  Negative for chest pain, palpitations and leg swelling.   Gastrointestinal: Positive for constipation (3 days). Negative for abdominal pain, diarrhea and blood in stool.   Genitourinary: Negative.  Negative for dysuria.   Musculoskeletal: Positive for myalgias, back pain and joint pain (right arm/shoulder). Negative for neck pain.   Skin: Negative.  Negative for itching and rash.        pale   Neurological: Positive for weakness and headaches. Negative for dizziness, tingling, tremors, sensory change and focal weakness.   Endo/Heme/Allergies: Negative.  Does not bruise/bleed easily.        Having anemia requiring transfusion    Psychiatric/Behavioral: Positive for depression (requests pysch consult- severe doesn't want to eat, see grandkids, do anything).      Physical Exam Laboratory/Imaging   Filed Vitals:    08/09/17 0817 08/09/17 1026 08/09/17 1056  08/09/17 1238   BP: 168/84 155/81 156/86 153/77   Pulse: 86 87 87 83   Temp: 36.1 °C (97 °F) 36.2 °C (97.2 °F) 36.5 °C (97.7 °F) 36 °C (96.8 °F)   Resp: 16 16 16 16   Height:       Weight:       SpO2: 96% 98% 98% 97%     Physical Exam   Constitutional: He is oriented to person, place, and time. He appears well-developed and well-nourished. No distress.   HENT:   Head: Normocephalic and atraumatic.   Eyes: Conjunctivae are normal. Right eye exhibits no discharge. No scleral icterus.   Neck: Normal range of motion. Neck supple. No tracheal deviation present.   Cardiovascular: Normal rate, regular rhythm, normal heart sounds and intact distal pulses.  Exam reveals no friction rub.    No murmur heard.  Pulmonary/Chest: Effort normal and breath sounds normal. No stridor. No respiratory distress. He has no wheezes. He has no rales.   Abdominal: Soft. Bowel sounds are normal. He exhibits no distension. There is no tenderness. There is no rebound.   Musculoskeletal: He exhibits tenderness. He exhibits no edema.   Generalized weakness.   Neurological: He is alert and oriented to person, place, and time. No cranial nerve deficit.   Skin: Skin is warm. No rash noted. He is not diaphoretic. No erythema. There is pallor.   Psychiatric: Judgment and thought content normal.   depressed    Lab Results   Component Value Date/Time    WBC 4.0* 08/09/2017 04:45 AM    HEMOGLOBIN 7.8* 08/09/2017 04:45 AM    HEMATOCRIT 24.1* 08/09/2017 04:45 AM    PLATELET COUNT 127* 08/09/2017 04:45 AM     Lab Results   Component Value Date/Time    SODIUM 131* 08/09/2017 06:19 AM    POTASSIUM 3.8 08/09/2017 06:19 AM    CHLORIDE 103 08/09/2017 06:19 AM    CO2 20 08/09/2017 06:19 AM    GLUCOSE 94 08/09/2017 06:19 AM    BUN 9 08/09/2017 06:19 AM    CREATININE 0.46* 08/09/2017 06:19 AM      Assessment/Plan    * Prostate cancer metastatic to bone (CMS-HCC) (present on admission)  Assessment & Plan  CT abdomen shows diffuse liver tres  Follows up on an as an  outpatient with rad onc Dr Doss and oncologist Dr. Price  Oncology consulted.    Palliative care following.  Transfer to oncology.  Oncology following.  Continue to follow labs.  PT/OT following      Nausea (present on admission)  Assessment & Plan  Resolved.  Antiemetics as needed      Generalized weakness (present on admission)  Assessment & Plan  Multifactorial  Continues to improve.  Continue fluid hydration  Continue prednisone  PT/OT    Hyponatremia (present on admission)  Assessment & Plan  Resolved.  Continue IV hydration.    Anemia (present on admission)  Assessment & Plan  Likely d/t chronic disease, status post transfusion 4 days ago   No evidence of overt bleeding  LDH and reticulocyte count elevated  Hgb 7.8.  Transfuse 1 unit PRBC.  follow-up a.m. CBC  Oncology following    Depression (present on admission)  Assessment & Plan  Continue Ritalin  Psych following       EKG reviewed, Labs reviewed, Medications reviewed and Radiology images reviewed  Sanchez catheter: No Sanchez      DVT Prophylaxis: Enoxaparin (Lovenox)  DVT prophylaxis - mechanical: SCDs  Ulcer prophylaxis: Not indicated    Assessed for rehab: Patient was assess for and/or received rehabilitation services during this hospitalization

## 2017-08-09 NOTE — PROGRESS NOTES
RepPt assessment complete. Pt aaox4, no signs of distress noted at this time. POC discussed with pt and verbalizes no questions. Pt denies any additional needs at this time. Bed in lowest position and locked. Pt educated on fall risk and verbalized understanding. Call light and personal belongings within reach. Will continue to monitor.

## 2017-08-10 LAB
ALBUMIN SERPL BCP-MCNC: 3.1 G/DL (ref 3.2–4.9)
ALBUMIN/GLOB SERPL: 1 G/DL
ALP SERPL-CCNC: 392 U/L (ref 30–99)
ALT SERPL-CCNC: 36 U/L (ref 2–50)
ANION GAP SERPL CALC-SCNC: 10 MMOL/L (ref 0–11.9)
AST SERPL-CCNC: 48 U/L (ref 12–45)
BILIRUB SERPL-MCNC: 0.6 MG/DL (ref 0.1–1.5)
BUN SERPL-MCNC: 9 MG/DL (ref 8–22)
CALCIUM SERPL-MCNC: 8.7 MG/DL (ref 8.5–10.5)
CHLORIDE SERPL-SCNC: 105 MMOL/L (ref 96–112)
CO2 SERPL-SCNC: 21 MMOL/L (ref 20–33)
CREAT SERPL-MCNC: 0.53 MG/DL (ref 0.5–1.4)
ERYTHROCYTE [DISTWIDTH] IN BLOOD BY AUTOMATED COUNT: 46.9 FL (ref 35.9–50)
GFR SERPL CREATININE-BSD FRML MDRD: >60 ML/MIN/1.73 M 2
GLOBULIN SER CALC-MCNC: 3.2 G/DL (ref 1.9–3.5)
GLUCOSE SERPL-MCNC: 83 MG/DL (ref 65–99)
HCT VFR BLD AUTO: 31.9 % (ref 42–52)
HGB BLD-MCNC: 10.9 G/DL (ref 14–18)
MCH RBC QN AUTO: 28.4 PG (ref 27–33)
MCHC RBC AUTO-ENTMCNC: 34.2 G/DL (ref 33.7–35.3)
MCV RBC AUTO: 83.1 FL (ref 81.4–97.8)
PLATELET # BLD AUTO: 129 K/UL (ref 164–446)
PMV BLD AUTO: 9.2 FL (ref 9–12.9)
POTASSIUM SERPL-SCNC: 3.8 MMOL/L (ref 3.6–5.5)
PROT SERPL-MCNC: 6.3 G/DL (ref 6–8.2)
RBC # BLD AUTO: 3.84 M/UL (ref 4.7–6.1)
SODIUM SERPL-SCNC: 136 MMOL/L (ref 135–145)
WBC # BLD AUTO: 5.1 K/UL (ref 4.8–10.8)

## 2017-08-10 PROCEDURE — 97140 MANUAL THERAPY 1/> REGIONS: CPT

## 2017-08-10 PROCEDURE — A9270 NON-COVERED ITEM OR SERVICE: HCPCS | Performed by: INTERNAL MEDICINE

## 2017-08-10 PROCEDURE — 85027 COMPLETE CBC AUTOMATED: CPT | Mod: 91

## 2017-08-10 PROCEDURE — 80053 COMPREHEN METABOLIC PANEL: CPT

## 2017-08-10 PROCEDURE — 700102 HCHG RX REV CODE 250 W/ 637 OVERRIDE(OP): Performed by: NURSE PRACTITIONER

## 2017-08-10 PROCEDURE — 700102 HCHG RX REV CODE 250 W/ 637 OVERRIDE(OP): Performed by: PSYCHIATRY & NEUROLOGY

## 2017-08-10 PROCEDURE — 99232 SBSQ HOSP IP/OBS MODERATE 35: CPT | Performed by: INTERNAL MEDICINE

## 2017-08-10 PROCEDURE — 700102 HCHG RX REV CODE 250 W/ 637 OVERRIDE(OP): Performed by: INTERNAL MEDICINE

## 2017-08-10 PROCEDURE — 770004 HCHG ROOM/CARE - ONCOLOGY PRIVATE *

## 2017-08-10 PROCEDURE — 96361 HYDRATE IV INFUSION ADD-ON: CPT

## 2017-08-10 PROCEDURE — 36415 COLL VENOUS BLD VENIPUNCTURE: CPT

## 2017-08-10 PROCEDURE — 700111 HCHG RX REV CODE 636 W/ 250 OVERRIDE (IP): Performed by: INTERNAL MEDICINE

## 2017-08-10 PROCEDURE — A9270 NON-COVERED ITEM OR SERVICE: HCPCS | Performed by: PSYCHIATRY & NEUROLOGY

## 2017-08-10 PROCEDURE — A9270 NON-COVERED ITEM OR SERVICE: HCPCS | Performed by: NURSE PRACTITIONER

## 2017-08-10 PROCEDURE — 700105 HCHG RX REV CODE 258: Performed by: INTERNAL MEDICINE

## 2017-08-10 RX ADMIN — TRAMADOL HYDROCHLORIDE 50 MG: 50 TABLET, COATED ORAL at 18:33

## 2017-08-10 RX ADMIN — ONDANSETRON 4 MG: 2 INJECTION INTRAMUSCULAR; INTRAVENOUS at 15:24

## 2017-08-10 RX ADMIN — SODIUM CHLORIDE: 9 INJECTION, SOLUTION INTRAVENOUS at 03:44

## 2017-08-10 RX ADMIN — TRAMADOL HYDROCHLORIDE 50 MG: 50 TABLET, COATED ORAL at 10:15

## 2017-08-10 RX ADMIN — TRAMADOL HYDROCHLORIDE 50 MG: 50 TABLET, COATED ORAL at 03:44

## 2017-08-10 RX ADMIN — PREDNISONE 5 MG: 5 TABLET ORAL at 09:32

## 2017-08-10 RX ADMIN — ONDANSETRON 4 MG: 2 INJECTION INTRAMUSCULAR; INTRAVENOUS at 21:31

## 2017-08-10 RX ADMIN — ACETAMINOPHEN 650 MG: 325 TABLET, FILM COATED ORAL at 09:31

## 2017-08-10 RX ADMIN — PREDNISONE 5 MG: 5 TABLET ORAL at 21:03

## 2017-08-10 RX ADMIN — ONDANSETRON 4 MG: 2 INJECTION INTRAMUSCULAR; INTRAVENOUS at 09:31

## 2017-08-10 RX ADMIN — METHYLPHENIDATE HYDROCHLORIDE 5 MG: 5 TABLET ORAL at 09:32

## 2017-08-10 RX ADMIN — STANDARDIZED SENNA CONCENTRATE AND DOCUSATE SODIUM 2 TABLET: 8.6; 5 TABLET, FILM COATED ORAL at 09:00

## 2017-08-10 RX ADMIN — STANDARDIZED SENNA CONCENTRATE AND DOCUSATE SODIUM 2 TABLET: 8.6; 5 TABLET, FILM COATED ORAL at 21:03

## 2017-08-10 RX ADMIN — ACETAMINOPHEN 650 MG: 325 TABLET, FILM COATED ORAL at 21:05

## 2017-08-10 ASSESSMENT — ENCOUNTER SYMPTOMS
MUSCULOSKELETAL NEGATIVE: 1
HEADACHES: 0
FEVER: 0
ABDOMINAL PAIN: 0
TINGLING: 0
DIZZINESS: 0
DIARRHEA: 0
DEPRESSION: 1
MYALGIAS: 1
SENSORY CHANGE: 0
FALLS: 0
WEAKNESS: 1
ABDOMINAL PAIN: 1
BRUISES/BLEEDS EASILY: 0
BLURRED VISION: 0
DIAPHORESIS: 0
BLOOD IN STOOL: 0
TREMORS: 0
WEIGHT LOSS: 1
BACK PAIN: 1
CARDIOVASCULAR NEGATIVE: 1
NAUSEA: 1
SPUTUM PRODUCTION: 0
NECK PAIN: 0
SHORTNESS OF BREATH: 0
EYES NEGATIVE: 1
RESPIRATORY NEGATIVE: 1
COUGH: 0

## 2017-08-10 ASSESSMENT — PAIN SCALES - GENERAL
PAINLEVEL_OUTOF10: 2
PAINLEVEL_OUTOF10: 3
PAINLEVEL_OUTOF10: 9
PAINLEVEL_OUTOF10: 8
PAINLEVEL_OUTOF10: 8

## 2017-08-10 ASSESSMENT — COGNITIVE AND FUNCTIONAL STATUS - GENERAL
MOBILITY SCORE: 17
MOVING FROM LYING ON BACK TO SITTING ON SIDE OF FLAT BED: A LITTLE
TURNING FROM BACK TO SIDE WHILE IN FLAT BAD: A LITTLE
SUGGESTED CMS G CODE MODIFIER MOBILITY: CK
MOVING TO AND FROM BED TO CHAIR: A LITTLE
STANDING UP FROM CHAIR USING ARMS: A LITTLE
WALKING IN HOSPITAL ROOM: A LITTLE
CLIMB 3 TO 5 STEPS WITH RAILING: A LOT

## 2017-08-10 NOTE — PROGRESS NOTES
Bedside report received 1430. POC discussed with pt; Pt c/o back pain and nausea, Medicated per MAR; Wife at bedside; Overnight hot sweats occuring; Afebrile; All need and questions answered at this time.

## 2017-08-10 NOTE — PROGRESS NOTES
Received pt a&ox4,pt was in bathroom,iv site leaking and bleeding,ivf stopped,will start new iv,poc explained to pt,family at bedside,neurostatus same,no c/o dizziness,ambualted self.

## 2017-08-10 NOTE — PROGRESS NOTES
Oncology/Hematology Progress Note               Author: JASON Vega Date & Time created: 8/10/2017  3:25 PM     Interval History:  CC: Metastatic Prostate  Feels better after transfusion  [facility] cannot get Jevtana; will try to get in office  Pain fairly well controlled; somewhat nauseated    Review of Systems:  Review of Systems   Constitutional: Positive for malaise/fatigue.   Respiratory: Negative.    Cardiovascular: Negative.    Gastrointestinal: Positive for nausea and abdominal pain.   Musculoskeletal: Negative.    Neurological: Positive for weakness.   Endo/Heme/Allergies: Negative.        Physical Exam:  Physical Exam   Constitutional: No distress.   HENT:   Mouth/Throat: No oropharyngeal exudate.   Eyes: Pupils are equal, round, and reactive to light. No scleral icterus.   Neck: Normal range of motion.   Cardiovascular: Normal rate.    Pulmonary/Chest: Effort normal.   Abdominal: Soft. Bowel sounds are normal. There is no tenderness.   Musculoskeletal: He exhibits edema.   Lymphadenopathy:     He has no cervical adenopathy.   Neurological: He is alert.   Skin: Skin is warm.       Labs:        Invalid input(s): [id number]      Recent Labs      08/08/17   0410  08/09/17   0619  08/10/17   0316   SODIUM  136  131*  136   POTASSIUM  4.4  3.8  3.8   CHLORIDE  108  103  105   CO2  21  20  21   BUN  11  9  9   CREATININE  0.52  0.46*  0.53   CALCIUM  8.3*  8.6  8.7     Recent Labs      08/08/17 0410  08/09/17   0619  08/10/17   0316   ALTSGPT  30  33  36   ASTSGOT  53*  46*  48*   ALKPHOSPHAT  361*  374*  392*   TBILIRUBIN  0.7  0.6  0.6   GLUCOSE  98  94  83     Recent Labs      08/08/17 0410  08/09/17   0445  08/10/17   0316   RBC  2.93*  2.84*  3.84*   HEMOGLOBIN  8.3*  7.8*  10.9*   HEMATOCRIT  25.1*  24.1*  31.9*   PLATELETCT  121*  127*  129*     Recent Labs      08/08/17 0410  08/09/17   0445  08/09/17   0619  08/10/17   0316   WBC  4.4*  4.0*   --   5.1   ASTSGOT  53*   --   46*  48*   ALTSGPT   30   --   33  36   ALKPHOSPHAT  361*   --   374*  392*   TBILIRUBIN  0.7   --   0.6  0.6     Recent Labs      17   0410  17   0619  08/10/17   0316   SODIUM  136  131*  136   POTASSIUM  4.4  3.8  3.8   CHLORIDE  108  103  105   CO2  21  20  21   GLUCOSE  98  94  83   BUN  11  9  9   CREATININE  0.52  0.46*  0.53   CALCIUM  8.3*  8.6  8.7     Hemodynamics:  Temp (24hrs), Av.1 °C (97 °F), Min:35.6 °C (96 °F), Max:36.5 °C (97.7 °F)  Temperature: (!) 35.6 °C (96 °F)  Pulse  Av.9  Min: 66  Max: 98   Blood Pressure: 145/80 mmHg     Respiratory:    Respiration: 18, Pulse Oximetry: 95 %        RUL Breath Sounds: Clear, RML Breath Sounds: Clear, RLL Breath Sounds: Clear, CELIA Breath Sounds: Clear, LLL Breath Sounds: Clear  Fluids:    Intake/Output Summary (Last 24 hours) at 08/10/17 1525  Last data filed at 17 1712   Gross per 24 hour   Intake      0 ml   Output      0 ml   Net      0 ml        GI/Nutrition:  Orders Placed This Encounter   Procedures   • Diet Order     Standing Status: Standing      Number of Occurrences: 1      Standing Expiration Date:      Order Specific Question:  Diet:     Answer:  Regular [1]     Order Specific Question:  Texture/Fiber modifications:     Answer:  Dysphagia 3(Mechanical Soft)specify fluid consistency(question 6) [3]     Medical Decision Making, by Problem:  Active Hospital Problems    Diagnosis   • *Prostate cancer metastatic to bone (CMS-HCC) [C61, C79.51]   • Nausea [R11.0]   • Generalized weakness [R53.1]   • Hyponatremia [E87.1]   • Depression [F32.9]   • Anemia [D64.9]       Plan:  Metastatic Prostate  Significant liver involvement  Home soon and follow up with [facility] for chemo  Guarded prognosis    Labs reviewed

## 2017-08-10 NOTE — THERAPY
"Physical Therapy Treatment completed.   Bed Mobility:  Supine to Sit: Stand by Assist  Transfers: Sit to Stand: Contact Guard Assist  Gait: Not performed during session today. Session focused on decreasing radiculopathy in left leg.     Plan of Care: Will benefit from Physical Therapy 3 times per week  Discharge Recommendations: Equipment: Front-Wheel Walker. Post-acute therapy Discharge to a transitional care facility for continued skilled therapy services. and Discharge to home with outpatient or home health for additional skilled therapy services.     See \"Rehab Therapy-Acute\" Patient Summary Report for complete documentation.     Mr. Yang has significant pain that incapacitates him when present. He demonstrated hypomobility in low back, hips, and neck as well as hypertonicity of multiple muscles in those areas, and multiple tender points with (+) jump sign in those areas. He responded well to previous physical therapy but had recurrence of pain. Following treatment today he stated that he felt generally better, demonstrated decreased tenderness at multiple points, decreased hypertonicity, and reported pain of 0/10 down left leg which is often his worst pain. He was educated to monitor pain during movement, headaches upon standing, and pain at night (specifically tonight) to determine full effectiveness of today's treatment. He would benefit from further skilled physical therapy while in-house to continue with pain control and improve his general function. He would also benefit from skilled outpatient physical therapy for the same benefits.   "

## 2017-08-10 NOTE — PROGRESS NOTES
Pt c/o pain, But declines pain intervention. Pt does not like to feel sedated, and pain medications effect him in this. Way . Heat pack provided. Per pt will notify RN if pain becomes unbearable. Will continue to monitor

## 2017-08-10 NOTE — PROGRESS NOTES
Pt c/o nausea. Medicated per MAR. Unable to scan pt and med because pt was working with PT no by bedside. 5 rights used to administer med.

## 2017-08-11 ENCOUNTER — AMB ONCOLOGY NURSE NAVIGATOR ENCOUNTER (OUTPATIENT)
Dept: OTHER | Facility: MEDICAL CENTER | Age: 57
End: 2017-08-11

## 2017-08-11 PROBLEM — M54.9 BACK PAIN: Status: ACTIVE | Noted: 2017-08-11

## 2017-08-11 PROBLEM — I10 INTERMITTENT HYPERTENSION: Status: ACTIVE | Noted: 2017-08-11

## 2017-08-11 LAB
ERYTHROCYTE [DISTWIDTH] IN BLOOD BY AUTOMATED COUNT: 48 FL (ref 35.9–50)
HCT VFR BLD AUTO: 33.2 % (ref 42–52)
HGB BLD-MCNC: 11.3 G/DL (ref 14–18)
MCH RBC QN AUTO: 28.7 PG (ref 27–33)
MCHC RBC AUTO-ENTMCNC: 34 G/DL (ref 33.7–35.3)
MCV RBC AUTO: 84.3 FL (ref 81.4–97.8)
PLATELET # BLD AUTO: 118 K/UL (ref 164–446)
PMV BLD AUTO: 8.8 FL (ref 9–12.9)
RBC # BLD AUTO: 3.94 M/UL (ref 4.7–6.1)
WBC # BLD AUTO: 5.2 K/UL (ref 4.8–10.8)

## 2017-08-11 PROCEDURE — 770004 HCHG ROOM/CARE - ONCOLOGY PRIVATE *

## 2017-08-11 PROCEDURE — 700101 HCHG RX REV CODE 250: Performed by: HOSPITALIST

## 2017-08-11 PROCEDURE — 700111 HCHG RX REV CODE 636 W/ 250 OVERRIDE (IP): Performed by: NURSE PRACTITIONER

## 2017-08-11 PROCEDURE — A9270 NON-COVERED ITEM OR SERVICE: HCPCS | Performed by: NURSE PRACTITIONER

## 2017-08-11 PROCEDURE — 99233 SBSQ HOSP IP/OBS HIGH 50: CPT | Performed by: RADIOLOGY

## 2017-08-11 PROCEDURE — 99232 SBSQ HOSP IP/OBS MODERATE 35: CPT | Performed by: HOSPITALIST

## 2017-08-11 PROCEDURE — 700102 HCHG RX REV CODE 250 W/ 637 OVERRIDE(OP): Performed by: NURSE PRACTITIONER

## 2017-08-11 PROCEDURE — 700102 HCHG RX REV CODE 250 W/ 637 OVERRIDE(OP): Performed by: HOSPITALIST

## 2017-08-11 PROCEDURE — A9270 NON-COVERED ITEM OR SERVICE: HCPCS | Performed by: HOSPITALIST

## 2017-08-11 PROCEDURE — A9270 NON-COVERED ITEM OR SERVICE: HCPCS | Performed by: INTERNAL MEDICINE

## 2017-08-11 PROCEDURE — 700111 HCHG RX REV CODE 636 W/ 250 OVERRIDE (IP): Performed by: INTERNAL MEDICINE

## 2017-08-11 PROCEDURE — 700102 HCHG RX REV CODE 250 W/ 637 OVERRIDE(OP): Performed by: INTERNAL MEDICINE

## 2017-08-11 RX ORDER — PREDNISONE 10 MG/1
5 TABLET ORAL DAILY
Status: DISCONTINUED | OUTPATIENT
Start: 2017-08-12 | End: 2017-08-12 | Stop reason: HOSPADM

## 2017-08-11 RX ORDER — HYDRALAZINE HYDROCHLORIDE 20 MG/ML
20 INJECTION INTRAMUSCULAR; INTRAVENOUS ONCE
Status: COMPLETED | OUTPATIENT
Start: 2017-08-11 | End: 2017-08-11

## 2017-08-11 RX ORDER — TRAMADOL HYDROCHLORIDE 50 MG/1
50 TABLET ORAL TWICE DAILY
Status: DISCONTINUED | OUTPATIENT
Start: 2017-08-11 | End: 2017-08-12 | Stop reason: HOSPADM

## 2017-08-11 RX ORDER — LIDOCAINE 50 MG/G
1 PATCH TOPICAL EVERY 24 HOURS
Status: DISCONTINUED | OUTPATIENT
Start: 2017-08-11 | End: 2017-08-12 | Stop reason: HOSPADM

## 2017-08-11 RX ORDER — CITALOPRAM 20 MG/1
20 TABLET ORAL DAILY
Status: DISCONTINUED | OUTPATIENT
Start: 2017-08-11 | End: 2017-08-11

## 2017-08-11 RX ORDER — TEMAZEPAM 15 MG/1
15 CAPSULE ORAL
Status: DISCONTINUED | OUTPATIENT
Start: 2017-08-11 | End: 2017-08-12 | Stop reason: HOSPADM

## 2017-08-11 RX ORDER — GABAPENTIN 100 MG/1
100 CAPSULE ORAL 3 TIMES DAILY
Status: DISCONTINUED | OUTPATIENT
Start: 2017-08-11 | End: 2017-08-12 | Stop reason: HOSPADM

## 2017-08-11 RX ADMIN — STANDARDIZED SENNA CONCENTRATE AND DOCUSATE SODIUM 2 TABLET: 8.6; 5 TABLET, FILM COATED ORAL at 09:37

## 2017-08-11 RX ADMIN — PROCHLORPERAZINE EDISYLATE 10 MG: 5 INJECTION INTRAMUSCULAR; INTRAVENOUS at 06:39

## 2017-08-11 RX ADMIN — GABAPENTIN 100 MG: 100 CAPSULE ORAL at 12:59

## 2017-08-11 RX ADMIN — TRAMADOL HYDROCHLORIDE 50 MG: 50 TABLET, COATED ORAL at 09:39

## 2017-08-11 RX ADMIN — TRAMADOL HYDROCHLORIDE 50 MG: 50 TABLET, COATED ORAL at 20:43

## 2017-08-11 RX ADMIN — HYDRALAZINE HYDROCHLORIDE 20 MG: 20 INJECTION INTRAMUSCULAR; INTRAVENOUS at 05:21

## 2017-08-11 RX ADMIN — PREDNISONE 5 MG: 5 TABLET ORAL at 09:37

## 2017-08-11 RX ADMIN — TRAMADOL HYDROCHLORIDE 50 MG: 50 TABLET, COATED ORAL at 00:51

## 2017-08-11 RX ADMIN — LIDOCAINE 1 PATCH: 50 PATCH TOPICAL at 20:52

## 2017-08-11 RX ADMIN — GABAPENTIN 100 MG: 100 CAPSULE ORAL at 20:43

## 2017-08-11 RX ADMIN — PROMETHAZINE HYDROCHLORIDE 25 MG: 25 TABLET ORAL at 09:37

## 2017-08-11 ASSESSMENT — PAIN SCALES - GENERAL
PAINLEVEL_OUTOF10: 7
PAINLEVEL_OUTOF10: 2
PAINLEVEL_OUTOF10: 4
PAINLEVEL_OUTOF10: 4
PAINLEVEL_OUTOF10: 0
PAINLEVEL_OUTOF10: 4
PAINLEVEL_OUTOF10: 4

## 2017-08-11 ASSESSMENT — ENCOUNTER SYMPTOMS
EYES NEGATIVE: 1
COUGH: 0
BACK PAIN: 1
FALLS: 0
FEVER: 0
SENSORY CHANGE: 0
ABDOMINAL PAIN: 0
WEIGHT LOSS: 1
NERVOUS/ANXIOUS: 1
TREMORS: 0
PALPITATIONS: 0
WEAKNESS: 1
TINGLING: 0
SPUTUM PRODUCTION: 0
RESPIRATORY NEGATIVE: 1
BLOOD IN STOOL: 0
DIZZINESS: 0
MYALGIAS: 1
NAUSEA: 0
CARDIOVASCULAR NEGATIVE: 1
SHORTNESS OF BREATH: 0
DOUBLE VISION: 0
BLURRED VISION: 0
DIAPHORESIS: 0
HEADACHES: 0
DEPRESSION: 1
BRUISES/BLEEDS EASILY: 0
DIARRHEA: 0
NECK PAIN: 0

## 2017-08-11 NOTE — DISCHARGE PLANNING
The patient's discharge plan will be to discharge home when his symptoms improve and to follow Oncology as an outpatient.  Per MD, he will need Jevtana as an outpatient.  He lives here in [place] with his wife.

## 2017-08-11 NOTE — PROGRESS NOTES
Received report and assumed patient care upon arrival to floor. Patient is resting in bed, A/O x4. Patient reports 8/10 pain at this time, medications provided per MAR.    PIV is patent with no s/s of infection or infiltration noted at this time.    Wife at bedside, plan of care discussed, questions answered. Oriented to unit and room. Bed is in the lowest position and locked, call light within reach, non-skid socks in place, hourly rounding. Patient reports no further needs and this time.

## 2017-08-11 NOTE — PROGRESS NOTES
Contacted Dr. Pierson's office, per Nisha, the office will contact patient to set up appointment, prior authorization needed for new drug regimen. Updated wife and dr. manriquez

## 2017-08-11 NOTE — PROGRESS NOTES
Report to Makenzie HURT pt going to [facility] 302/00.  Pt on transport list. Patient and wife aware of transfer.

## 2017-08-11 NOTE — PROGRESS NOTES
Pt c/o heart beating fast; Pt HR is 82, /92, C/O pain; Medicated for pain. Will continue to monitor.

## 2017-08-11 NOTE — PROGRESS NOTES
Bedside visit with Rhys and his wife.  States he had a difficult night.  Active listening, empathic support and therapeutic touch utilized during visit.  Pt will be starting on Jevtana after discharge.  Provided printed education for pt to review at a later time.  Encouraged pt and wife to call with any questions or needs.

## 2017-08-11 NOTE — DIETARY
Nutrition Services: Poor PO & Wt Loss on Nutrition Admit Screen   57 year old male with admit dx of prostate cancer metastatic to bone, nausea, hyponatremia, anemia, generalized weakness  Past Pertinent Med Hx: prostate cancer, breath shortness, claustrophobia    Attempted to visit pt twice, pt was sleeping. Spoke with family member. Family states pt appetite hasn't been great and ate 25% of breakfast this morning. Pt was receiving Boost and now family states bringing in protein shake from home. Family states pt enjoys milkshakes will add with lunch and dinner. No PO documented in chart yet.     Ht: 175.3 cm  Wt 8/6: 87.7 kg via stand up scale  BMI: 28.54  Diet: Regular, Dysphagia 3  Pertinent Labs 8/10: Reviewed.   Pertinent Meds: celexa, lovenox, neurontin, ritalin, deltasone, pericolace, ultram, phenergan (prn)  Fluids: No IV fluids noted in MAR  Skin: No skin breakdown noted in chart  GI: Last BM 8/9    PLAN/RECOMMEND -   1) Nutrition rep to see patient daily for meal and snack preferences.  2) Encourage PO  3) Weekly weights to monitor fluid and nutrition status  4) Please document PO intake for each meal as percentage of meals consumed    RD following

## 2017-08-11 NOTE — PROGRESS NOTES
Oncology/Hematology Progress Note               Author: JASON Vega Date & Time created: 8/11/2017  10:48 AM     Interval History:  CC: Metastatic Prostate CA  Advanced disease to liver  Plan on treatment next week in office with Latha  Had bump in BP this AM; given hydralazine    Review of Systems:  Review of Systems   Constitutional: Positive for malaise/fatigue. Negative for fever.   Cardiovascular: Negative.    Gastrointestinal: Negative for abdominal pain.   Neurological: Positive for weakness. Negative for headaches.   Psychiatric/Behavioral: Positive for depression. The patient is nervous/anxious.        Physical Exam:  Physical Exam   Constitutional: He is oriented to person, place, and time. No distress.   HENT:   Mouth/Throat: No oropharyngeal exudate.   Eyes: Pupils are equal, round, and reactive to light. No scleral icterus.   Cardiovascular: Normal rate.    Pulmonary/Chest: Effort normal.   Abdominal: Soft. Bowel sounds are normal.   Musculoskeletal: He exhibits no edema.   Lymphadenopathy:     He has no cervical adenopathy.   Neurological: He is oriented to person, place, and time.   Skin: Skin is warm.       Labs:        Invalid input(s): [id number]      Recent Labs      08/09/17   0619  08/10/17   0316   SODIUM  131*  136   POTASSIUM  3.8  3.8   CHLORIDE  103  105   CO2  20  21   BUN  9  9   CREATININE  0.46*  0.53   CALCIUM  8.6  8.7     Recent Labs      08/09/17   0619  08/10/17   0316   ALTSGPT  33  36   ASTSGOT  46*  48*   ALKPHOSPHAT  374*  392*   TBILIRUBIN  0.6  0.6   GLUCOSE  94  83     Recent Labs      08/09/17   0445  08/10/17   0316  08/10/17   2338   RBC  2.84*  3.84*  3.94*   HEMOGLOBIN  7.8*  10.9*  11.3*   HEMATOCRIT  24.1*  31.9*  33.2*   PLATELETCT  127*  129*  118*     Recent Labs      08/09/17   0445  08/09/17   0619  08/10/17   0316  08/10/17   2338   WBC  4.0*   --   5.1  5.2   ASTSGOT   --   46*  48*   --    ALTSGPT   --   33  36   --    ALKPHOSPHAT   --   374*  392*    --    TBILIRUBIN   --   0.6  0.6   --      Recent Labs      17   0619  08/10/17   0316   SODIUM  131*  136   POTASSIUM  3.8  3.8   CHLORIDE  103  105   CO2  20  21   GLUCOSE  94  83   BUN  9  9   CREATININE  0.46*  0.53   CALCIUM  8.6  8.7     Hemodynamics:  Temp (24hrs), Av.4 °C (97.5 °F), Min:35.6 °C (96 °F), Max:37.1 °C (98.7 °F)  Temperature: 36.6 °C (97.9 °F)  Pulse  Av.1  Min: 66  Max: 98   Blood Pressure: 135/84 mmHg     Respiratory:    Respiration: 18, Pulse Oximetry: 94 %        RUL Breath Sounds: Clear, RML Breath Sounds: Clear, RLL Breath Sounds: Clear, CELIA Breath Sounds: Clear, LLL Breath Sounds: Clear  Fluids:    Intake/Output Summary (Last 24 hours) at 17 1048  Last data filed at 08/10/17 2200   Gross per 24 hour   Intake      0 ml   Output      0 ml   Net      0 ml        GI/Nutrition:  Orders Placed This Encounter   Procedures   • Diet Order     Standing Status: Standing      Number of Occurrences: 1      Standing Expiration Date:      Order Specific Question:  Diet:     Answer:  Regular [1]     Order Specific Question:  Texture/Fiber modifications:     Answer:  Dysphagia 3(Mechanical Soft)specify fluid consistency(question 6) [3]     Medical Decision Making, by Problem:  Active Hospital Problems    Diagnosis   • *Prostate cancer metastatic to bone (CMS-HCC) [C61, C79.51]   • Nausea [R11.0]   • Generalized weakness [R53.1]   • Hyponatremia [E87.1]   • Depression [F32.9]   • Anemia [D64.9]       Plan:  Metastatic Prostate  Guarded prognosis  Home when BP down and patient agrees  Hoping for Jevtana in office    Labs reviewed

## 2017-08-11 NOTE — CONSULTS
RADIATION ONCOLOGY FOLLOW-UP    DATE OF SERVICE: 8/11/2017    IDENTIFICATION:  A 57 y.o. male with metastatic prostate cancer, hormone resistant, now with innumerable liver metastases and profound weakness.      HISTORY OF PRESENT ILLNESS:   He is well-known to me as I treated his prostatic fossa following a rising PSA from a prostatectomy 10/30/2007. The prostatic fossa radiation therapy was completed 6/27/2008. He then was found to have metastatic disease in the lung and ribs in November 2015. He had been on Taxotere and Lupron he then developed enlargement after initial shrinkage of the left suprahilar left upper lobe disease. He has since received radiation therapy to the lung mediastinum, right shoulder, and T10 vertebral body. More recently he has become extremely fatigued unable to walk he said jaundice and ultimately was admitted to the hospital for workup. Unfortunately during this time a CT scan of the chest abdomen pelvis on 8/7/2017 revealed diffuse liver metastases, the sclerotic metastasis at the  T10 vertebral body, right lower lobe lung nodules unchanged. He is seen today at the request of his wife    CURRENT MEDICATIONS:  Current Facility-Administered Medications   Medication Dose Route Frequency Provider Last Rate Last Dose   • tramadol (ULTRAM) 50 MG tablet 50 mg  50 mg Oral Q6HRS PRN Nahun Little, A.P.R.N.   50 mg at 08/11/17 0051   • ketorolac (TORADOL) injection 30 mg  30 mg Intravenous Q6HRS PRN Nahun Little, A.P.R.N.   30 mg at 08/08/17 1318   • methylphenidate (RITALIN) tablet 5 mg  5 mg Oral BID Jennifer Joshi M.D.   5 mg at 08/10/17 0932   • Enzalutamide CAPS 160 mg  160 mg Oral Q EVENING HARISH Sebastian.O.   160 mg at 08/09/17 2100   • predniSONE (DELTASONE) tablet 5 mg  5 mg Oral BID HECTOR SebastianO.   5 mg at 08/10/17 2103   • senna-docusate (PERICOLACE or SENOKOT S) 8.6-50 MG per tablet 2 Tab  2 Tab Oral BID HARISH Sebastian.O.   2 Tab at 08/10/17 2103    And   •  polyethylene glycol/lytes (MIRALAX) PACKET 1 Packet  1 Packet Oral QDAY PRN Mey Harding D.O.        And   • magnesium hydroxide (MILK OF MAGNESIA) suspension 30 mL  30 mL Oral QDAY PRN Mey Harding D.O.        And   • bisacodyl (DULCOLAX) suppository 10 mg  10 mg Rectal QDAY PRN Mey Harding D.O.       • Respiratory Care per Protocol   Nebulization Continuous RT Mey Harding D.O.       • NS infusion   Intravenous Continuous Mey Harding D.O. 83 mL/hr at 08/10/17 1922     • enoxaparin (LOVENOX) inj 40 mg  40 mg Subcutaneous DAILY HECTOR SebastianOPreeti   40 mg at 08/06/17 2007   • ondansetron (ZOFRAN) syringe/vial injection 4 mg  4 mg Intravenous Q4HRS PRN HARISH Sebastian.O.   4 mg at 08/10/17 2131   • ondansetron (ZOFRAN ODT) dispertab 4 mg  4 mg Oral Q4HRS PRN HARISH Sebastian.O.       • promethazine (PHENERGAN) tablet 12.5-25 mg  12.5-25 mg Oral Q4HRS PRN HARISH Sebastian.O.   25 mg at 08/09/17 1502   • promethazine (PHENERGAN) suppository 12.5-25 mg  12.5-25 mg Rectal Q4HRS PRN HARISH Sebastian.O.       • prochlorperazine (COMPAZINE) injection 5-10 mg  5-10 mg Intravenous Q4HRS PRN HARISH Sebastian.O.   10 mg at 08/11/17 0639   • acetaminophen (TYLENOL) tablet 650 mg  650 mg Oral Q6HRS PRN HARISH Sebastian.O.   650 mg at 08/10/17 2105       ALLERGIES:  Review of patient's allergies indicates no known allergies.    FAMILY HISTORY:    Family History   Problem Relation Age of Onset   • Cancer Father      lung cancer   [unfilled]        SOCIAL HISTORY:     reports that he quit smoking about 32 years ago. His smoking use included Cigarettes. He has a 15 pack-year smoking history. He uses smokeless tobacco. He reports that he does not drink alcohol or use illicit drugs.    REVIEW OF SYSTEMS:   Constitutional ROS: Extreme fatigue, weight loss, lethargy  Eye ROS: No recent significant change in vision  Ear ROS: No ear pain  Mouth/Throat ROS: No teeth or gum problems  Neck ROS: No lumps or masses  Pulmonary ROS: Radiation  pneumonitis  Cardiovascular ROS: No exercise intolerance, No chest pain, No shortness of breath, No dyspnea on exertion, No diaphoresis, No cyanosis, No palpitations  Gastrointestinal ROS: No abdominal pain    Musculoskeletal/Extremities ROS: Extreme weakness and some swelling generalized  Hematologic/Lymphatic ROS: No coagulation disorder  Skin/Integumentary ROS: Mild jaundice  Neurologic ROS: Profound weakness generalized weakness  Psychiatric ROS: Depression        PHYSICAL EXAM:    GENERAL: Nearly obtunded slightly yellowish but alert and oriented ×3  HEENT:  Pupils are equal, round, and reactive to light.  Extraocular muscles   are intact. Sclerae nonicteric.  Conjunctivae pink.  Oral cavity, tongue   protrudes midline.   NECK:  Supple without evidence of thyromegaly.    ABDOMEN:  Soft. No evidence of hepatosplenomegaly.    EXTREMITIES: Trace Edema.  NEUROLOGIC:  Cranial nerves II through XII were intact. Normal stance and gait. Motor and sensory grossly within normal limits.    ECOG PERFORMANCE STATUS:  4=Completely disabled.  Cannot carry on any self care.  Totally confined to bed or chair.    LABORATORY DATA:   Lab Results   Component Value Date/Time    SODIUM 136 08/10/2017 03:16 AM    POTASSIUM 3.8 08/10/2017 03:16 AM    CHLORIDE 105 08/10/2017 03:16 AM    CO2 21 08/10/2017 03:16 AM    GLUCOSE 83 08/10/2017 03:16 AM    BUN 9 08/10/2017 03:16 AM    CREATININE 0.53 08/10/2017 03:16 AM     Lab Results   Component Value Date/Time    ALKALINE PHOSPHATASE 392* 08/10/2017 03:16 AM    AST(SGOT) 48* 08/10/2017 03:16 AM    ALT(SGPT) 36 08/10/2017 03:16 AM    TOTAL BILIRUBIN 0.6 08/10/2017 03:16 AM      Lab Results   Component Value Date/Time    WBC 5.2 08/10/2017 11:38 PM    RBC 3.94* 08/10/2017 11:38 PM    HEMOGLOBIN 11.3* 08/10/2017 11:38 PM    HEMATOCRIT 33.2* 08/10/2017 11:38 PM    MCV 84.3 08/10/2017 11:38 PM    MCH 28.7 08/10/2017 11:38 PM    MCHC 34.0 08/10/2017 11:38 PM    MPV 8.8* 08/10/2017 11:38 PM     NEUTROPHILS-POLYS 76.10* 08/06/2017 02:52 PM    LYMPHOCYTES 13.30* 08/06/2017 02:52 PM    MONOCYTES 7.10 08/06/2017 02:52 PM    EOSINOPHILS 1.70 08/06/2017 02:52 PM    BASOPHILS 0.90 08/06/2017 02:52 PM    ANISOCYTOSIS 1+ 08/06/2017 02:52 PM          IMPRESSION:    A 57 y.o. with metastatic prostate cancer now with diffuse liver involvement. I suspect he has diffuse disease to the bones as well.    RECOMMENDATIONS:   I spent 40 minutes with the patient and his wife discussing the diagnosis prognosis and possible treatment. They do understand that he has been treated multiple times with chemotherapy and there is a very limited chance that he's going to respond to anything that is given to him. There is no role for radiation therapy at this time. I also discussed palliative care and hospice and hopefully there going to consider this after all the consultations have been done. They noted visually no further role for radiation therapy at this time.  Patient and his wife also noted on happy to help with any decisions that they have, and we are in contact.  40 minutes was spent face-to-face with patient in the office and more than half of that time was spent counseling patient or coordinating care as described above.      Thank you for the opportunity to participate in his care.  If any questions or comments, please do not hesitate in calling.      Please note that this dictation was created using voice recognition software. I have made every reasonable attempt to correct obvious errors, but I expect that there are errors of grammar and possibly content that I did not discover before finalizing the note.

## 2017-08-12 ENCOUNTER — HOME HEALTH ADMISSION (OUTPATIENT)
Dept: HOME HEALTH SERVICES | Facility: HOME HEALTHCARE | Age: 57
End: 2017-08-12
Payer: COMMERCIAL

## 2017-08-12 ENCOUNTER — HOME HEALTH ADMISSION (OUTPATIENT)
Dept: HOME HEALTH SERVICES | Facility: HOME HEALTHCARE | Age: 57
End: 2017-08-12

## 2017-08-12 VITALS
SYSTOLIC BLOOD PRESSURE: 136 MMHG | BODY MASS INDEX: 27.23 KG/M2 | OXYGEN SATURATION: 93 % | HEART RATE: 101 BPM | TEMPERATURE: 98.6 F | WEIGHT: 183.86 LBS | DIASTOLIC BLOOD PRESSURE: 75 MMHG | RESPIRATION RATE: 18 BRPM | HEIGHT: 69 IN

## 2017-08-12 LAB
ANION GAP SERPL CALC-SCNC: 7 MMOL/L (ref 0–11.9)
BUN SERPL-MCNC: 18 MG/DL (ref 8–22)
CALCIUM SERPL-MCNC: 9.1 MG/DL (ref 8.5–10.5)
CHLORIDE SERPL-SCNC: 97 MMOL/L (ref 96–112)
CO2 SERPL-SCNC: 24 MMOL/L (ref 20–33)
CREAT SERPL-MCNC: 0.56 MG/DL (ref 0.5–1.4)
ERYTHROCYTE [DISTWIDTH] IN BLOOD BY AUTOMATED COUNT: 47.7 FL (ref 35.9–50)
GFR SERPL CREATININE-BSD FRML MDRD: >60 ML/MIN/1.73 M 2
GLUCOSE SERPL-MCNC: 119 MG/DL (ref 65–99)
HCT VFR BLD AUTO: 30.8 % (ref 42–52)
HGB BLD-MCNC: 10.5 G/DL (ref 14–18)
MCH RBC QN AUTO: 28 PG (ref 27–33)
MCHC RBC AUTO-ENTMCNC: 34.1 G/DL (ref 33.7–35.3)
MCV RBC AUTO: 82.1 FL (ref 81.4–97.8)
PLATELET # BLD AUTO: 121 K/UL (ref 164–446)
PMV BLD AUTO: 8.8 FL (ref 9–12.9)
POTASSIUM SERPL-SCNC: 3.9 MMOL/L (ref 3.6–5.5)
RBC # BLD AUTO: 3.75 M/UL (ref 4.7–6.1)
SODIUM SERPL-SCNC: 128 MMOL/L (ref 135–145)
WBC # BLD AUTO: 5.5 K/UL (ref 4.8–10.8)

## 2017-08-12 PROCEDURE — 80048 BASIC METABOLIC PNL TOTAL CA: CPT

## 2017-08-12 PROCEDURE — 36415 COLL VENOUS BLD VENIPUNCTURE: CPT

## 2017-08-12 PROCEDURE — 700111 HCHG RX REV CODE 636 W/ 250 OVERRIDE (IP): Performed by: SPECIALIST

## 2017-08-12 PROCEDURE — 85027 COMPLETE CBC AUTOMATED: CPT

## 2017-08-12 PROCEDURE — 700102 HCHG RX REV CODE 250 W/ 637 OVERRIDE(OP): Performed by: HOSPITALIST

## 2017-08-12 PROCEDURE — A9270 NON-COVERED ITEM OR SERVICE: HCPCS | Performed by: NURSE PRACTITIONER

## 2017-08-12 PROCEDURE — A9270 NON-COVERED ITEM OR SERVICE: HCPCS | Performed by: HOSPITALIST

## 2017-08-12 PROCEDURE — 700102 HCHG RX REV CODE 250 W/ 637 OVERRIDE(OP): Performed by: NURSE PRACTITIONER

## 2017-08-12 PROCEDURE — 99239 HOSP IP/OBS DSCHRG MGMT >30: CPT | Performed by: HOSPITALIST

## 2017-08-12 RX ORDER — ONDANSETRON 4 MG/1
4 TABLET, ORALLY DISINTEGRATING ORAL EVERY 8 HOURS PRN
Qty: 24 TAB | Refills: 1 | Status: ON HOLD | OUTPATIENT
Start: 2017-08-12 | End: 2017-08-22

## 2017-08-12 RX ORDER — AMOXICILLIN 250 MG
2 CAPSULE ORAL 2 TIMES DAILY PRN
Qty: 60 TAB | Refills: 1 | Status: ON HOLD | OUTPATIENT
Start: 2017-08-12 | End: 2017-08-25

## 2017-08-12 RX ORDER — METHYLPHENIDATE HYDROCHLORIDE 5 MG/1
5 TABLET ORAL 2 TIMES DAILY
Qty: 60 TAB | Refills: 0 | Status: ON HOLD | OUTPATIENT
Start: 2017-08-12 | End: 2017-08-21

## 2017-08-12 RX ORDER — TEMAZEPAM 15 MG/1
15 CAPSULE ORAL
Qty: 30 CAP | Refills: 0 | Status: ON HOLD | OUTPATIENT
Start: 2017-08-12 | End: 2017-08-21

## 2017-08-12 RX ORDER — TRAMADOL HYDROCHLORIDE 50 MG/1
50 TABLET ORAL EVERY 6 HOURS PRN
Qty: 28 TAB | Refills: 0 | Status: ON HOLD | OUTPATIENT
Start: 2017-08-12 | End: 2017-08-22

## 2017-08-12 RX ORDER — LIDOCAINE 50 MG/G
1 PATCH TOPICAL EVERY 24 HOURS
Qty: 30 PATCH | Refills: 0 | Status: ON HOLD | OUTPATIENT
Start: 2017-08-12 | End: 2017-08-21

## 2017-08-12 RX ORDER — TRAMADOL HYDROCHLORIDE 50 MG/1
50 TABLET ORAL ONCE
Status: COMPLETED | OUTPATIENT
Start: 2017-08-12 | End: 2017-08-12

## 2017-08-12 RX ORDER — GABAPENTIN 100 MG/1
200 CAPSULE ORAL 3 TIMES DAILY
Qty: 90 CAP | Refills: 1 | Status: ON HOLD | OUTPATIENT
Start: 2017-08-12 | End: 2017-08-22

## 2017-08-12 RX ADMIN — TRAMADOL HYDROCHLORIDE 50 MG: 50 TABLET, COATED ORAL at 09:26

## 2017-08-12 RX ADMIN — PREDNISONE 5 MG: 10 TABLET ORAL at 08:22

## 2017-08-12 RX ADMIN — GABAPENTIN 100 MG: 100 CAPSULE ORAL at 08:21

## 2017-08-12 RX ADMIN — TRAMADOL HYDROCHLORIDE 50 MG: 50 TABLET, COATED ORAL at 17:38

## 2017-08-12 RX ADMIN — GABAPENTIN 100 MG: 100 CAPSULE ORAL at 14:12

## 2017-08-12 RX ADMIN — TRAMADOL HYDROCHLORIDE 50 MG: 50 TABLET, COATED ORAL at 13:30

## 2017-08-12 ASSESSMENT — PAIN SCALES - GENERAL
PAINLEVEL_OUTOF10: 0

## 2017-08-12 NOTE — ASSESSMENT & PLAN NOTE
Wife does not want any MRI at this time.  Uncontrolled pain may be the reason of HTN episodes.   Lidocaine patch.  Tramadol bid.  Gabapentin 100 mg tid.

## 2017-08-12 NOTE — PROGRESS NOTES
Received report and assumed patient care upon arrival to floor. Patient is resting in bed, A/O x4. Patient reports 4/10 pain at this time, medications provided per MAR.    PIV is patent with no s/s of infection or infiltration noted at this time.    Wife at bedside, plan of care discussed, questions answered. Oriented to unit and room. Bed is in the lowest position and locked, call light within reach, non-skid socks in place, hourly rounding. Patient reports no further needs and this time.

## 2017-08-12 NOTE — CARE PLAN
Problem: Discharge Barriers/Planning  Goal: Patient’s continuum of care needs will be met  DC home when cleared    Problem: Pain Management  Goal: Pain level will decrease to patient’s comfort goal  Outcome: PROGRESSING SLOWER THAN EXPECTED

## 2017-08-12 NOTE — PROGRESS NOTES
Pt D/C'd. PIV removed. Discharge instructions provided to pt.  Pt states understanding.  Pt states all questions have been answered.  Copy of discharge provided to pt.  Signed copy in chart.  Prescriptions provided to pt, copy in chart. Pt states that all personal belongings are in possession.

## 2017-08-12 NOTE — FACE TO FACE
Face to Face Supporting Documentation - Home Health    The encounter with this patient was in whole or in part the primary reason for home health admission.    Date of encounter:   Patient:                    MRN:                       YOB: 2019  Rhys Yang  [id number]  1960     Home health to see patient for:  Skilled Nursing care for assessment, interventions & education    Skilled need for:  Medication management. Home helalth. PT/OT.    Skilled nursing interventions to include:  Medication management and education.     Homebound status evidenced by:  Need the aid of supportive devices such as crutches, canes, wheelchairs or walkers. Leaving home requires a considerable and taxing effort. There is a normal inability to leave the home.    Community Physician to provide follow up care: ROSALBA Gauthier     Optional Interventions? Pt need PT/OT, Health aid.       I certify the face to face encounter for this home health care referral meets the CMS requirements and the encounter/clinical assessment with the patient was, in whole, or in part, for the medical condition(s) listed above, which is the primary reason for home health care. Based on my clinical findings: the service(s) are medically necessary, support the need for home health care, and the homebound criteria are met.  I certify that this patient has had a face to face encounter by myself.  Wilber Chand M.D. - NPI: [id number]

## 2017-08-12 NOTE — PROGRESS NOTES
Received report, assumed pt care. Pt a&o 4, VSS, assessment completed. Resting comfortably in bed with call light, bedside table in reach. No c/o at this time. Side rails up 2. Instructed to use call light when needing to get OOB verbalized understanding. Bed in low position. Will continue to monitor.

## 2017-08-12 NOTE — ASSESSMENT & PLAN NOTE
Likely from uncontrolled pain. Pt has no hx of hypertension.   Will manage pain better. Stop IV fluid as pt is eating and drinking ok.

## 2017-08-12 NOTE — PROGRESS NOTES
Awaiting a call back from  for HH acceptance prior to pt DC. RN will DC pt as soon as RN hears from .

## 2017-08-12 NOTE — FACE TO FACE
Face to Face Note  -  Durable Medical Equipment    Wilber Chand M.D. - NPI: [id number]  I certify that this patient is under my care and that they had a durable medical equipment(DME)face to face encounter by myself that meets the physician DME face-to-face encounter requirements with this patient on:    Date of encounter:   Patient:                    MRN:                       YOB: 2019  Rhys Yang  [id number]  1960     The encounter with the patient was in whole, or in part, for the following medical condition, which is the primary reason for durable medical equipment:      I certify that, based on my findings, the following durable medical equipment is medically necessary:  Walkers.    Pt need Skilled nursing, PT/OT at home.        ,     ,         My Clinical findings support the need for the above equipment due to:  Abnormal Gait    Supporting Symptoms: Metastatic prostate cancer. Back pain.

## 2017-08-12 NOTE — DISCHARGE PLANNING
Care Transition Team Discharge Planning                   Discharge Plan:  MD states that he would like for pt to d/c home with HH today. SW met with pt and his spouse at bedside and they would like to choose [facility]. Choice form completed and sent to Deysi KAPOOR.   Plan: Monitor for acceptance to [facility].(PCP is Radha NOLAN).

## 2017-08-12 NOTE — PROGRESS NOTES
RN tried to make pt an appointment with Dr. Price next week however the office is closed. Wife and pt given Dr. Price office number and instructed to call first thing Monday to make an appointment. Wife and pt confirmed understanding.

## 2017-08-12 NOTE — PROGRESS NOTES
Hospital Medicine Interval Note  Date of Service:  8/11/2017    Chief Complaint  57 y.o.-year-old male with history of prostate cancer stage IV with bony metastases recent discharge on August 3 for anemia requiring 1 unit PRBC transfusion. Patient admitted with increased weakness and loss of appetite likely secondary to chronic disease state.    Interval Problem Update  8/10 - Weakness improving.  Complains of pain and nausea.  Unable to receive Jevtana at [facility].  Will have to get this as outpatient.  VSS.  Afebrile.    8/11. Pt denies any CP or SOB. He is weak and sleepy. He reports some pain in lower back with radiating pain to the left leg. He has one episode of hypertension in 190s. Pt has no hx of HTN. Stop IV fluid. Start good pain medications. Continue Ratilin per psych recommendations. However, it can cause sometime HTN but I suspect elevated intermittent BP is due to pain.     Consultants/Specialty  Oncology - Dr. Vega.  Palliative care  Psychiatry - Dr. Hsu.    Disposition  Continue PT/OT, pain and nausea control.  Can d/c home when symptoms improve and follow with oncology as outpatient.     Review of Systems   Constitutional: Positive for weight loss and malaise/fatigue. Negative for fever and diaphoresis (2/2 medication).   HENT: Negative for congestion.    Eyes: Negative.  Negative for blurred vision and double vision.   Respiratory: Negative.  Negative for cough, sputum production and shortness of breath.    Cardiovascular: Negative.  Negative for chest pain, palpitations and leg swelling.   Gastrointestinal: Negative for nausea, abdominal pain, diarrhea and blood in stool. Constipation: 3 days.   Genitourinary: Negative.  Negative for dysuria and urgency.   Musculoskeletal: Positive for myalgias, back pain and joint pain (right arm/shoulder). Negative for falls and neck pain.   Skin: Negative.  Negative for itching.   Neurological: Positive for weakness. Negative for dizziness, tingling,  tremors, sensory change and headaches.   Endo/Heme/Allergies: Negative.  Does not bruise/bleed easily.        Having anemia requiring transfusion    Psychiatric/Behavioral: Positive for depression (requests pysch consult- severe doesn't want to eat, see grandkids, do anything).      Physical Exam Laboratory/Imaging   Filed Vitals:    08/11/17 0800 08/11/17 1057 08/11/17 1513 08/11/17 2000   BP: 135/84 139/74 143/78 140/78   Pulse: 90 91 86 99   Temp: 36.6 °C (97.9 °F)   36.7 °C (98 °F)   Resp: 18 18 18 18   Height:       Weight:       SpO2: 94% 96%  95%     Physical Exam   Constitutional: He is oriented to person, place, and time. He appears well-developed and well-nourished.   HENT:   Head: Normocephalic and atraumatic.   Mouth/Throat: No oropharyngeal exudate.   Eyes: Conjunctivae are normal. Right eye exhibits no discharge. Left eye exhibits no discharge. No scleral icterus.   Neck: Normal range of motion. Neck supple. No JVD present.   Cardiovascular: Normal rate, regular rhythm, normal heart sounds and intact distal pulses.    No murmur heard.  Pulmonary/Chest: Effort normal and breath sounds normal. No stridor. No respiratory distress. He has no wheezes. He has no rales.   Abdominal: Soft. Bowel sounds are normal. He exhibits no distension. There is no tenderness. There is no guarding.   Musculoskeletal: He exhibits tenderness.   Generalized weakness.  Pt has some tenderness in lower back    Neurological: He is alert and oriented to person, place, and time.   Skin: Skin is warm. He is not diaphoretic. No erythema. There is pallor.   Psychiatric: Judgment and thought content normal.   depressed    Lab Results   Component Value Date/Time    WBC 5.2 08/10/2017 11:38 PM    HEMOGLOBIN 11.3* 08/10/2017 11:38 PM    HEMATOCRIT 33.2* 08/10/2017 11:38 PM    PLATELET COUNT 118* 08/10/2017 11:38 PM     Lab Results   Component Value Date/Time    SODIUM 136 08/10/2017 03:16 AM    POTASSIUM 3.8 08/10/2017 03:16 AM     CHLORIDE 105 08/10/2017 03:16 AM    CO2 21 08/10/2017 03:16 AM    GLUCOSE 83 08/10/2017 03:16 AM    BUN 9 08/10/2017 03:16 AM    CREATININE 0.53 08/10/2017 03:16 AM      Assessment/Plan    * Prostate cancer metastatic to bone (CMS-HCC) (present on admission)  Assessment & Plan  CT abdomen shows diffuse liver tres  Follows up on an as an outpatient with rad onc Dr Doss and oncologist Dr. Price  Palliative care following.  Oncology following.  Will trial the drug Juvtana, but will have to be done as outpatient as [facility] is unable to provide the medication.  Hgb stable.  Continue to follow labs.  PT/OT following      Nausea (present on admission)  Assessment & Plan  Worse today.  Antiemetics as needed.      Generalized weakness (present on admission)  Assessment & Plan  Multifactorial  Continues to improve after blood transfusions.  Continue fluid hydration  Continue prednisone  PT/OT  May need home health at d/c.    Hyponatremia (present on admission)  Assessment & Plan  Resolved.  Continue IV hydration.    Anemia (present on admission)  Assessment & Plan  Likely d/t chronic disease, status post transfusion 4 days ago   No evidence of overt bleeding  Hgb 10.9.    follow-up a.m. CBC  Oncology following    Depression (present on admission)  Assessment & Plan  Continue Ritalin  Psych following      Back pain (present on admission)  Assessment & Plan  Wife does not want any MRI at this time.  Uncontrolled pain may be the reason of HTN episodes.   Lidocaine patch.  Tramadol bid.  Gabapentin 100 mg tid.     Intermittent hypertension (present on admission)  Assessment & Plan  Likely from uncontrolled pain. Pt has no hx of hypertension.   Will manage pain better. Stop IV fluid as pt is eating and drinking ok.        EKG reviewed, Labs reviewed, Medications reviewed and Radiology images reviewed  Sanchez catheter: No Sanchez      DVT Prophylaxis: Enoxaparin (Lovenox)  DVT prophylaxis - mechanical: SCDs  Ulcer prophylaxis: Not  indicated    Assessed for rehab: Patient was assess for and/or received rehabilitation services during this hospitalization

## 2017-08-12 NOTE — PALLIATIVE CARE
PALLIATIVE CARE FOLLOW-UP:    Pt's spouse crying in hallway.  Active listening, reflection, reminiscing, therapeutic touch and empathic support of anticipatory grief provided for nearly one hour.      Spouse reported that pt seemed better able to enjoy family after first dose of Ritalin but then did not like the speedy feeling and has since refused to take - encouraged to encourage pt to take at least morning dose.  Spouse and pt also concerned about efficacy and pt rxn to new pain management plan (including gabapentin, tramadol, and lidoderm patch); spouse stated her fears of d/c home when all new meds have been started and changes in pt's BP.  Spouse acknowledged that she does not want pt to stay any longer in hospital than necessary d/t possible exposure to hospital-acquired infection, but wants pt to feel comfortable with his pain management plan prior to d/c.    Plan:  PC to f/u tomorrow.    Thank you for allowing Palliative Care to support this pt and his family.  Contact [phone] for additional assistance, questions or concerns.

## 2017-08-12 NOTE — DISCHARGE SUMMARY
CHIEF COMPLAINT ON ADMISSION  Chief Complaint   Patient presents with   • Weakness       CODE STATUS  Prior    HPI & HOSPITAL COURSE  This is 58 Yo male with PMHx significant for metastatic prostate cancer. He has proctectomy on 10/30/2007. He required salvage radiation to the prostatic bed that was completed on 6/27/2008. He then was found to have metastatic disease in the lung and ribs in November 2015. He was on Taxotere and Lupron. Later he has enlargement after initial shrinkage of the left suprahilar left upper lobe disease. He also has lesion in thoracic area. Since than he has received radiation therapy to the lung mediastinum, right shoulder, and T10 vertebral body. Last one month his PSA continue to rise while he was on Xtandi.    He presented to the ER and admitted to the hospital on 8/6/17 for progressively worsening profound weakness and lower back pain. He was dizzy. He has anemia hgb 7.8 and received 1 units of pRBC. He was started on good pain medications. His AST/ALT and LDH were elevated however his bilirubin was normal. CT scan of the chest abdomen pelvis on 8/7/2017 revealed diffuse liver metastases, the sclerotic metastasis at the  T10 vertebral body, right lower lobe lung nodules unchanged.     He was placed on good pain medications. He was PT/OT. Dr. Vega and Dr. Doss saw patient as well. There is no role of further radiation therapy at this time. Dr. Vega recommended for treatment next week in office with [facility] as this is not available in the hospital.     Pt was given Lidocaine patch, his back pain is improving. He was evaluated by psychiatry and placed on Ritalin. Pt was able to move slowly with help of crutches.  was consulted and pt will have home health, home PT/OT will be arranged. Pt will be given prescription for Lidocaine patch, Tramadol prn, bowel regiemen, Zofran prn, and Ritalin. Pt Vitas are stable and pain is under control.     Therefore, he is discharged  in guarded and stable condition with close outpatient follow-up.    SPECIFIC OUTPATIENT FOLLOW-UP  Dr. Price Next week.     DISCHARGE PROBLEM LIST  Principal Problem:    Prostate cancer metastatic to bone (CMS-HCC) POA: Yes  Active Problems:    Nausea POA: Yes    Generalized weakness POA: Yes    Hyponatremia POA: Yes    Anemia POA: Yes    Depression POA: Yes    Back pain POA: Yes    Intermittent hypertension POA: Yes  Resolved Problems:    * No resolved hospital problems. *      FOLLOW UP  Dr. Price next week.    MEDICATIONS ON DISCHARGE   Trey Rhys Vin   Home Medication Instructions TOBY:[id number]    Printed on:08/12/17 4981   Medication Information                      alprazolam (XANAX) 0.5 MG Tab  Take 0.5 mg by mouth every bedtime.             Enzalutamide (XTANDI) 40 MG Cap  Take 160 mg by mouth every evening.             gabapentin (NEURONTIN) 100 MG Cap  Take 2 Caps by mouth 3 times a day.             leuprolide (LUPRON) 22.5 MG Kit  22.5 mg by Intramuscular route Every 3 Months.             lidocaine (LIDODERM) 5 % Patch  Apply 1 Patch to skin as directed every 24 hours.             methylphenidate (RITALIN) 5 MG Tab  Take 1 Tab by mouth 2 Times a Day.             ondansetron (ZOFRAN ODT) 4 MG TABLET DISPERSIBLE  Take 1 Tab by mouth every 8 hours as needed for Nausea/Vomiting (give PO if no IV route available).             predniSONE (DELTASONE) 5 MG Tab  Take 5 mg by mouth 2 times a day.             senna-docusate (PERICOLACE OR SENOKOT S) 8.6-50 MG Tab  Take 2 Tabs by mouth 2 times a day as needed.             temazepam (RESTORIL) 15 MG Cap  Take 1 Cap by mouth at bedtime as needed.             tramadol (ULTRAM) 50 MG Tab  Take 1 Tab by mouth every 6 hours as needed.                 DIET  Orders Placed This Encounter   Procedures   • Diet Order     Standing Status: Standing      Number of Occurrences: 1      Standing Expiration Date:      Order Specific Question:  Diet:     Answer:  Regular [1]      Order Specific Question:  Texture/Fiber modifications:     Answer:  Dysphagia 3(Mechanical Soft)specify fluid consistency(question 6) [3]       ACTIVITY  As tolerated.  Weight bearing as tolerated      CONSULTATIONS  Oncology - Dr. Vega.  Palliative care  Psychiatry - Dr. Hsu.    PROCEDURES  See above in hospital course.     LABORATORY  Lab Results   Component Value Date/Time    SODIUM 128* 08/12/2017 12:56 PM    POTASSIUM 3.9 08/12/2017 12:56 PM    CHLORIDE 97 08/12/2017 12:56 PM    CO2 24 08/12/2017 12:56 PM    GLUCOSE 119* 08/12/2017 12:56 PM    BUN 18 08/12/2017 12:56 PM    CREATININE 0.56 08/12/2017 12:56 PM        Lab Results   Component Value Date/Time    WBC 5.5 08/12/2017 12:56 PM    HEMOGLOBIN 10.5* 08/12/2017 12:56 PM    HEMATOCRIT 30.8* 08/12/2017 12:56 PM    PLATELET COUNT 121* 08/12/2017 12:56 PM        Total time of the discharge process exceeds 36 minutes

## 2017-08-12 NOTE — DISCHARGE PLANNING
Care Transition Team Discharge Planning                   Discharge Plan: [facility]  is unable to accept pts insurance. Updated pts nurse Stacy and Dr. Chand and [facility] is sending to [place] now to see if they are able to accept pt.

## 2017-08-12 NOTE — DISCHARGE INSTRUCTIONS
Discharge Instructions    Discharged to home by car with relative. Discharged via wheelchair, hospital escort: Yes.  Special equipment needed: Not Applicable    Be sure to schedule a follow-up appointment with your primary care doctor or any specialists as instructed.     Discharge Plan:   Influenza Vaccine Indication: Patient Refuses    I understand that a diet low in cholesterol, fat, and sodium is recommended for good health. Unless I have been given specific instructions below for another diet, I accept this instruction as my diet prescription.   Other diet: Regular    Special Instructions: None    · Is patient discharged on Warfarin / Coumadin?   No     · Is patient Post Blood Transfusion?  No    Depression / Suicide Risk    As you are discharged from this [facility] facility, it is important to learn how to keep safe from harming yourself.    Recognize the warning signs:  · Abrupt changes in personality, positive or negative- including increase in energy   · Giving away possessions  · Change in eating patterns- significant weight changes-  positive or negative  · Change in sleeping patterns- unable to sleep or sleeping all the time   · Unwillingness or inability to communicate  · Depression  · Unusual sadness, discouragement and loneliness  · Talk of wanting to die  · Neglect of personal appearance   · Rebelliousness- reckless behavior  · Withdrawal from people/activities they love  · Confusion- inability to concentrate     If you or a loved one observes any of these behaviors or has concerns about self-harm, here's what you can do:  · Talk about it- your feelings and reasons for harming yourself  · Remove any means that you might use to hurt yourself (examples: pills, rope, extension cords, firearm)  · Get professional help from the community (Mental Health, Substance Abuse, psychological counseling)  · Do not be alone:Call your Safe Contact- someone whom you trust who will be there for you.  · Call your  local CRISIS HOTLINE 425-7087 or 919-549-3443  · Call your local Children's Mobile Crisis Response Team [place] (986) 526-3575 or www.Node1  · Call the toll free National Suicide Prevention Hotlines   · National Suicide Prevention Lifeline 080-686-EHEA (3353)  · National [employer] Line Network 800-SUICIDE (114-1395)

## 2017-08-12 NOTE — PALLIATIVE CARE
"PALLIATIVE CARE FOLLOW UP:  Appreciate notification from RN that patient/wife awake and discharge orders are in placed. Checked in with patient. Introduced myself and let him know I was circling back for his primary PC LICHA Mason. He reports his pain has improved but he is struggling with taking medication, \"I've never taken medicine.\" He reports that his is on board with discharge to . Provided business card and encouraged him to call with any questions, needs or concern.     Met with patient's wife Diana in nutrition room. Introduced myself and let him know I was circling back for his primary PC RN Isabella. Provided update of conversation with her . She is also on board with discharge with  and confirms his pain has improved and stated \"he's just going to have to do it\" regarding taking medications. Offered support and encouraged her to call with any needs or for support in the future as we are always a resource for her. Notified her I left business card with patient.     Updated: RN who reports Dr. Chand is aware of need for  order.     Thank you for allowing Palliative Care to follow this patient. Please contact us at  with any questions.   "

## 2017-08-12 NOTE — PALLIATIVE CARE
"PALLIATIVE CARE FOLLOW UP:  Rounded to f/u on Diana's concern's regarding new medications and patient discharge. Patient and wife laying down with blinds closed. Did not stir/rise when I opened the door and said his name softly.     Call placed to RN Stacy to discuss above. Stacy reports patient refused am medications in spite of RN education reporting they make him drowsy and he doesn't \"want to take all these meds.\" Requested Stacy reach out to palliative care when patient/wife are awake and/or as needed to explore further.     Thank you for allowing Palliative Care to follow this patient. Please contact us at  with any questions.   "

## 2017-08-13 ENCOUNTER — PATIENT OUTREACH (OUTPATIENT)
Dept: HEALTH INFORMATION MANAGEMENT | Facility: OTHER | Age: 57
End: 2017-08-13

## 2017-08-13 NOTE — DISCHARGE PLANNING
Care Transition Team Discharge Planning                   Discharge Plan: Permission received from pt to send HH referrals to all other agencies.

## 2017-08-13 NOTE — DISCHARGE PLANNING
Received page from pt RN regarding ETA of [facility].    Spoke with [facility], face to face is in but no order for [facility].     TC to pts RN, she states she already utilized traction, faxed choice for [facility]. Requested Dr valentin lou order.

## 2017-08-14 NOTE — DISCHARGE PLANNING
Received call from Giuliana at [facility] they have declined patient due to insurance.  CCT Alea advised.

## 2017-08-21 ENCOUNTER — HOSPITAL ENCOUNTER (OUTPATIENT)
Facility: MEDICAL CENTER | Age: 57
End: 2017-08-21
Payer: COMMERCIAL

## 2017-08-21 ENCOUNTER — APPOINTMENT (OUTPATIENT)
Dept: RADIOLOGY | Facility: MEDICAL CENTER | Age: 57
DRG: 948 | End: 2017-08-21
Attending: INTERNAL MEDICINE
Payer: COMMERCIAL

## 2017-08-21 ENCOUNTER — HOSPITAL ENCOUNTER (INPATIENT)
Facility: MEDICAL CENTER | Age: 57
LOS: 5 days | DRG: 948 | End: 2017-08-26
Attending: INTERNAL MEDICINE | Admitting: INTERNAL MEDICINE
Payer: COMMERCIAL

## 2017-08-21 ENCOUNTER — RESOLUTE PROFESSIONAL BILLING HOSPITAL PROF FEE (OUTPATIENT)
Dept: HOSPITALIST | Facility: MEDICAL CENTER | Age: 57
End: 2017-08-21
Payer: COMMERCIAL

## 2017-08-21 DIAGNOSIS — C61 PROSTATE CANCER METASTATIC TO BONE (HCC): ICD-10-CM

## 2017-08-21 DIAGNOSIS — C79.51 PROSTATE CANCER METASTATIC TO BONE (HCC): ICD-10-CM

## 2017-08-21 PROBLEM — E80.6 HYPERBILIRUBINEMIA: Status: ACTIVE | Noted: 2017-08-21

## 2017-08-21 PROBLEM — M54.9 INTRACTABLE BACK PAIN: Status: ACTIVE | Noted: 2017-08-21

## 2017-08-21 LAB
ALBUMIN SERPL BCP-MCNC: 3 G/DL (ref 3.2–4.9)
ALBUMIN/GLOB SERPL: 0.9 G/DL
ALP SERPL-CCNC: 768 U/L (ref 30–99)
ALT SERPL-CCNC: 51 U/L (ref 2–50)
ANION GAP SERPL CALC-SCNC: 11 MMOL/L (ref 0–11.9)
ANISOCYTOSIS BLD QL SMEAR: ABNORMAL
AST SERPL-CCNC: 73 U/L (ref 12–45)
BASOPHILS # BLD AUTO: 0 % (ref 0–1.8)
BASOPHILS # BLD: 0 K/UL (ref 0–0.12)
BILIRUB SERPL-MCNC: 1.6 MG/DL (ref 0.1–1.5)
BUN SERPL-MCNC: 21 MG/DL (ref 8–22)
CALCIUM SERPL-MCNC: 8.8 MG/DL (ref 8.5–10.5)
CHLORIDE SERPL-SCNC: 96 MMOL/L (ref 96–112)
CO2 SERPL-SCNC: 22 MMOL/L (ref 20–33)
CREAT SERPL-MCNC: 0.68 MG/DL (ref 0.5–1.4)
DACRYOCYTES BLD QL SMEAR: NORMAL
EOSINOPHIL # BLD AUTO: 0 K/UL (ref 0–0.51)
EOSINOPHIL NFR BLD: 0 % (ref 0–6.9)
ERYTHROCYTE [DISTWIDTH] IN BLOOD BY AUTOMATED COUNT: 52.3 FL (ref 35.9–50)
GFR SERPL CREATININE-BSD FRML MDRD: >60 ML/MIN/1.73 M 2
GLOBULIN SER CALC-MCNC: 3.4 G/DL (ref 1.9–3.5)
GLUCOSE SERPL-MCNC: 140 MG/DL (ref 65–99)
HCT VFR BLD AUTO: 26.9 % (ref 42–52)
HGB BLD-MCNC: 8.7 G/DL (ref 14–18)
LYMPHOCYTES # BLD AUTO: 0.27 K/UL (ref 1–4.8)
LYMPHOCYTES NFR BLD: 4.4 % (ref 22–41)
MAGNESIUM SERPL-MCNC: 2.5 MG/DL (ref 1.5–2.5)
MANUAL DIFF BLD: ABNORMAL
MCH RBC QN AUTO: 27.9 PG (ref 27–33)
MCHC RBC AUTO-ENTMCNC: 32.3 G/DL (ref 33.7–35.3)
MCV RBC AUTO: 86.2 FL (ref 81.4–97.8)
METAMYELOCYTES NFR BLD MANUAL: 0.9 %
MICROCYTES BLD QL SMEAR: ABNORMAL
MONOCYTES # BLD AUTO: 0.33 K/UL (ref 0–0.85)
MONOCYTES NFR BLD AUTO: 5.3 % (ref 0–13.4)
MORPHOLOGY BLD-IMP: NORMAL
MYELOCYTES NFR BLD MANUAL: 2.6 %
NEUTROPHILS # BLD AUTO: 5.38 K/UL (ref 1.82–7.42)
NEUTROPHILS NFR BLD: 76.3 % (ref 44–72)
NEUTS BAND NFR BLD MANUAL: 10.5 % (ref 0–10)
NRBC # BLD AUTO: 0.22 K/UL
NRBC BLD AUTO-RTO: 3.6 /100 WBC
PLATELET # BLD AUTO: 167 K/UL (ref 164–446)
PMV BLD AUTO: 8.7 FL (ref 9–12.9)
POIKILOCYTOSIS BLD QL SMEAR: NORMAL
POLYCHROMASIA BLD QL SMEAR: NORMAL
POTASSIUM SERPL-SCNC: 4.4 MMOL/L (ref 3.6–5.5)
PROT SERPL-MCNC: 6.4 G/DL (ref 6–8.2)
RBC # BLD AUTO: 3.12 M/UL (ref 4.7–6.1)
RBC BLD AUTO: PRESENT
SCHISTOCYTES BLD QL SMEAR: NORMAL
SODIUM SERPL-SCNC: 129 MMOL/L (ref 135–145)
WBC # BLD AUTO: 6.2 K/UL (ref 4.8–10.8)

## 2017-08-21 PROCEDURE — 83735 ASSAY OF MAGNESIUM: CPT

## 2017-08-21 PROCEDURE — 36415 COLL VENOUS BLD VENIPUNCTURE: CPT

## 2017-08-21 PROCEDURE — 80048 BASIC METABOLIC PNL TOTAL CA: CPT

## 2017-08-21 PROCEDURE — 85007 BL SMEAR W/DIFF WBC COUNT: CPT | Mod: 91

## 2017-08-21 PROCEDURE — 80053 COMPREHEN METABOLIC PANEL: CPT

## 2017-08-21 PROCEDURE — A9270 NON-COVERED ITEM OR SERVICE: HCPCS | Performed by: HOSPITALIST

## 2017-08-21 PROCEDURE — 700102 HCHG RX REV CODE 250 W/ 637 OVERRIDE(OP): Performed by: HOSPITALIST

## 2017-08-21 PROCEDURE — 99223 1ST HOSP IP/OBS HIGH 75: CPT | Performed by: HOSPITALIST

## 2017-08-21 PROCEDURE — 85027 COMPLETE CBC AUTOMATED: CPT | Mod: 91

## 2017-08-21 PROCEDURE — 71010 DX-CHEST-LIMITED (1 VIEW): CPT

## 2017-08-21 PROCEDURE — 700111 HCHG RX REV CODE 636 W/ 250 OVERRIDE (IP): Performed by: HOSPITALIST

## 2017-08-21 PROCEDURE — 770004 HCHG ROOM/CARE - ONCOLOGY PRIVATE *

## 2017-08-21 RX ORDER — MORPHINE SULFATE 4 MG/ML
2 INJECTION, SOLUTION INTRAMUSCULAR; INTRAVENOUS
Status: DISCONTINUED | OUTPATIENT
Start: 2017-08-21 | End: 2017-08-21

## 2017-08-21 RX ORDER — PREDNISONE 20 MG/1
40 TABLET ORAL DAILY
Status: DISCONTINUED | OUTPATIENT
Start: 2017-08-21 | End: 2017-08-24

## 2017-08-21 RX ORDER — TRAMADOL HYDROCHLORIDE 50 MG/1
50 TABLET ORAL EVERY 6 HOURS PRN
Status: DISCONTINUED | OUTPATIENT
Start: 2017-08-21 | End: 2017-08-24

## 2017-08-21 RX ORDER — AMOXICILLIN 250 MG
2 CAPSULE ORAL 2 TIMES DAILY
Status: DISCONTINUED | OUTPATIENT
Start: 2017-08-21 | End: 2017-08-26 | Stop reason: HOSPADM

## 2017-08-21 RX ORDER — POLYETHYLENE GLYCOL 3350 17 G/17G
1 POWDER, FOR SOLUTION ORAL
Status: DISCONTINUED | OUTPATIENT
Start: 2017-08-21 | End: 2017-08-26 | Stop reason: HOSPADM

## 2017-08-21 RX ORDER — PROMETHAZINE HYDROCHLORIDE 25 MG/1
12.5-25 SUPPOSITORY RECTAL EVERY 4 HOURS PRN
Status: DISCONTINUED | OUTPATIENT
Start: 2017-08-21 | End: 2017-08-26 | Stop reason: HOSPADM

## 2017-08-21 RX ORDER — OXYCODONE HYDROCHLORIDE 5 MG/1
5 TABLET ORAL
Status: DISCONTINUED | OUTPATIENT
Start: 2017-08-21 | End: 2017-08-24

## 2017-08-21 RX ORDER — PROMETHAZINE HYDROCHLORIDE 25 MG/1
12.5-25 TABLET ORAL EVERY 4 HOURS PRN
Status: DISCONTINUED | OUTPATIENT
Start: 2017-08-21 | End: 2017-08-26 | Stop reason: HOSPADM

## 2017-08-21 RX ORDER — GABAPENTIN 300 MG/1
300 CAPSULE ORAL 3 TIMES DAILY
Status: DISCONTINUED | OUTPATIENT
Start: 2017-08-21 | End: 2017-08-26 | Stop reason: HOSPADM

## 2017-08-21 RX ORDER — BISACODYL 10 MG
10 SUPPOSITORY, RECTAL RECTAL
Status: DISCONTINUED | OUTPATIENT
Start: 2017-08-21 | End: 2017-08-26 | Stop reason: HOSPADM

## 2017-08-21 RX ORDER — KETOROLAC TROMETHAMINE 30 MG/ML
15 INJECTION, SOLUTION INTRAMUSCULAR; INTRAVENOUS EVERY 8 HOURS
Status: DISCONTINUED | OUTPATIENT
Start: 2017-08-21 | End: 2017-08-24

## 2017-08-21 RX ORDER — ONDANSETRON 2 MG/ML
4 INJECTION INTRAMUSCULAR; INTRAVENOUS EVERY 4 HOURS PRN
Status: DISCONTINUED | OUTPATIENT
Start: 2017-08-21 | End: 2017-08-26 | Stop reason: HOSPADM

## 2017-08-21 RX ORDER — ONDANSETRON 4 MG/1
4 TABLET, ORALLY DISINTEGRATING ORAL EVERY 4 HOURS PRN
Status: DISCONTINUED | OUTPATIENT
Start: 2017-08-21 | End: 2017-08-26 | Stop reason: HOSPADM

## 2017-08-21 RX ADMIN — GABAPENTIN 300 MG: 300 CAPSULE ORAL at 17:29

## 2017-08-21 RX ADMIN — TRAMADOL HYDROCHLORIDE 50 MG: 50 TABLET, COATED ORAL at 23:21

## 2017-08-21 RX ADMIN — GABAPENTIN 300 MG: 300 CAPSULE ORAL at 21:42

## 2017-08-21 RX ADMIN — STANDARDIZED SENNA CONCENTRATE AND DOCUSATE SODIUM 2 TABLET: 8.6; 5 TABLET, FILM COATED ORAL at 21:42

## 2017-08-21 RX ADMIN — PREDNISONE 40 MG: 20 TABLET ORAL at 17:29

## 2017-08-21 ASSESSMENT — PAIN SCALES - GENERAL
PAINLEVEL_OUTOF10: 1
PAINLEVEL_OUTOF10: 1
PAINLEVEL_OUTOF10: 3
PAINLEVEL_OUTOF10: 1

## 2017-08-21 ASSESSMENT — LIFESTYLE VARIABLES: ALCOHOL_USE: NO

## 2017-08-21 ASSESSMENT — PATIENT HEALTH QUESTIONNAIRE - PHQ9
SUM OF ALL RESPONSES TO PHQ QUESTIONS 1-9: 0
1. LITTLE INTEREST OR PLEASURE IN DOING THINGS: NOT AT ALL
2. FEELING DOWN, DEPRESSED, IRRITABLE, OR HOPELESS: NOT AT ALL
SUM OF ALL RESPONSES TO PHQ9 QUESTIONS 1 AND 2: 0

## 2017-08-21 NOTE — H&P
DATE OF ADMISSION:  08/21/2017    PRIMARY ONCOLOGIST:  Lisandro Price MD    PRIMARY CARE PHYSICIAN:  RENATO Gauthier    CHIEF COMPLAINT:  Intractable low back pain radiating to the left leg.    HISTORY OF PRESENT ILLNESS:  A 57-year-old male.  He has a long history of   prostate cancer.  He was diagnosed in 2007, treated with a prostatectomy in   that year.  He had increase in his PSA and subsequently had radiation therapy.    Patient did quite well for some time; however, had progression of disease   that was identified when he had hemoptysis in 2015.  Since that time, he has   had multiple treatments with radiation therapy to the chest, right shoulder,   and T10 vertebral body.  Patient was recently admitted to the hospital in   early August for pain and weakness.  During that time, it was noted that he   had developed significant liver metastasis.  He was sent home for outpatient   followup.    Dr. Price, patient's current oncologist, called me today and asked me to admit   this patient to the hospital for intractable back pain.  Patient has had   progressive pain in his sacrum radiating to his left leg.  This has worsened   since his discharge from the hospital.  He is getting around in a wheelchair,   hesitant to move very far or leave the house.  He gets some relief from   Neurontin and tramadol.  He is hesitant to take opiate-containing pain   medications because they make him sleepy.  He feels that he is weak in his   lower extremities, although this is not demonstrated on exam.  No loss of   bowel or bladder control.  No loss of sensation in his lower extremities.  He   is anxious about getting an MRI tomorrow.    REVIEW OF SYSTEMS:  Comprehensive review of systems was performed.  All   pertinent positives and negatives are described in the HPI.  All other systems   reviewed and are negative.    PAST MEDICAL HISTORY:  Metastatic prostate cancer as above.    SOCIAL HISTORY:  Patient quit smoking 30  years ago.  He used to drink quite a   bit; however, does not anymore.  He is a retired .  He lives with his   wife.    FAMILY HISTORY:  Father had lung cancer and myocardial infarction, and his   mother had a stroke.    ALLERGIES:  No known drug allergies.    MEDICATIONS:  Xanax 0.5 mg at bedtime, Xtandi 160 mg every evening, gabapentin   300 mg 3 times a day, Lupron 22.5 mg every 3 months, lidocaine 5% patch apply   to skin every 24 hours, Ritalin 5 mg twice daily, Zofran 4 mg every 8 hours   as needed for nausea and vomiting, prednisone 5 mg twice daily, tramadol 50 mg   every 6 hours as needed.    PHYSICAL EXAMINATION:  VITAL SIGNS:  Temperature 36.5, blood pressure 127/75, pulse 108, respirations   18, saturating 93% on room air.  GENERAL:  The patient is well-developed, well-nourished, in no apparent   distress.  HEENT:  Pupils are equally round and reactive.  Extraocular movements are   intact.  Anicteric sclerae.  NECK:  Supple, no lymphadenopathy, no thyromegaly.  CARDIOVASCULAR:  Regular rate and rhythm.  No murmurs, rubs or gallops.  PMI   is nondisplaced.  RESPIRATORY:  Clear to auscultation bilaterally.  No wheezing, no crackles.    There is no chest wall tenderness.  BACK:  Patient reports tenderness to palpation in his lumbosacral spine.  SKIN:  No rash, no petechiae.  NEUROLOGICAL:  Cranial nerves II through XII are intact.  The patient is   oriented and appropriate.  His speech is normal in content and not slurred.    He has 5/5 strength in upper and lower extremities bilaterally.  Sensation to   light touch is intact bilateral lower extremities.    LABORATORY DATA:    Sodium 129, AST 73, ALT 51, Alk Phos 768    ASSESSMENT AND PLAN:  A 57-year-old male with a history of metastatic prostate   cancer, presents with intractable cancer-related pain.  1.  Intractable cancer-related pain.  Patient has difficult to control pain.    This is likely due to metastatic cancer.  I spoke with Dr. Price, we  are going   to pursue imaging of his lumbar spine as well as his left hip.  These have to   be done with sedation, as he has not tolerated MRIs in the past.  2.  Metastatic prostate cancer.  Patient may benefit from radiation therapy.    He tells me he does not want to pursue further chemotherapy due to side   effects in the past.  We will continue to address this issue.  Hopefully, his   pain can be better controlled.  3. Hyponatremia: this is chronic, doubt symptomatic, will follow  4. Hyperbilirubinemia: likely due to liver involvement, worsened, repeat labs AM after hydration  5.  Prophylaxis:  Lovenox, bowel regimen.  6.  Full code.    Case discussed with oncologist, Dr. Lisandro Price.  I expect the patient to   remain in the hospital for greater than 2 midnights.       ____________________________________     MD DAVID ALLEN / MARSHALL    DD:  08/21/2017 15:28:11  DT:  08/21/2017 16:30:55    D#:  [id number]  Job#:  [id number]

## 2017-08-21 NOTE — PROGRESS NOTES
MRI cannot be completed today due to no availability of IR RN. Pt and MD aware. MRI will call RN with a time for tomorrow. MRI screening sheet and contrast consent completed and in chart.

## 2017-08-21 NOTE — PROGRESS NOTES
Pt arrived to unit via wheelchair around 1430, transferred to bed. Head to toe assessment complete. Overview given of routine, call light, fall precautions, POC, pt and family agreeable.   Reviewed orders. Dr Rodrigues aware of pt arrival and will clarify MRI orders (w or w/o contrast). Pt reports pain is currently stable. PIV to be placed. Admit and med rec complete.

## 2017-08-21 NOTE — PROGRESS NOTES
Direct admit from [facility]. Dr. Price, T accepting for Intractable pain, Met prostate cancer and Failure to thrive. Written orders received and faxed to [facility] and scanned into EPIC. ADT signed and held 08/21/2017 at 1218 and will need to be released upon patient arrival to unit. Patient arriving via POV.

## 2017-08-22 ENCOUNTER — APPOINTMENT (OUTPATIENT)
Dept: RADIOLOGY | Facility: MEDICAL CENTER | Age: 57
DRG: 948 | End: 2017-08-22
Attending: HOSPITALIST
Payer: COMMERCIAL

## 2017-08-22 LAB
ANION GAP SERPL CALC-SCNC: 10 MMOL/L (ref 0–11.9)
ANISOCYTOSIS BLD QL SMEAR: ABNORMAL
BASOPHILS # BLD AUTO: 0 % (ref 0–1.8)
BASOPHILS # BLD: 0 K/UL (ref 0–0.12)
BUN SERPL-MCNC: 23 MG/DL (ref 8–22)
CALCIUM SERPL-MCNC: 9.1 MG/DL (ref 8.5–10.5)
CHLORIDE SERPL-SCNC: 95 MMOL/L (ref 96–112)
CO2 SERPL-SCNC: 24 MMOL/L (ref 20–33)
CREAT SERPL-MCNC: 0.63 MG/DL (ref 0.5–1.4)
DACRYOCYTES BLD QL SMEAR: NORMAL
EOSINOPHIL # BLD AUTO: 0 K/UL (ref 0–0.51)
EOSINOPHIL NFR BLD: 0 % (ref 0–6.9)
ERYTHROCYTE [DISTWIDTH] IN BLOOD BY AUTOMATED COUNT: 51.8 FL (ref 35.9–50)
GFR SERPL CREATININE-BSD FRML MDRD: >60 ML/MIN/1.73 M 2
GLUCOSE SERPL-MCNC: 146 MG/DL (ref 65–99)
HCT VFR BLD AUTO: 26.3 % (ref 42–52)
HGB BLD-MCNC: 8.4 G/DL (ref 14–18)
LYMPHOCYTES # BLD AUTO: 0.22 K/UL (ref 1–4.8)
LYMPHOCYTES NFR BLD: 3.5 % (ref 22–41)
MANUAL DIFF BLD: NORMAL
MCH RBC QN AUTO: 27.8 PG (ref 27–33)
MCHC RBC AUTO-ENTMCNC: 31.9 G/DL (ref 33.7–35.3)
MCV RBC AUTO: 87.1 FL (ref 81.4–97.8)
MICROCYTES BLD QL SMEAR: ABNORMAL
MONOCYTES # BLD AUTO: 0.16 K/UL (ref 0–0.85)
MONOCYTES NFR BLD AUTO: 2.6 % (ref 0–13.4)
MORPHOLOGY BLD-IMP: NORMAL
MYELOCYTES NFR BLD MANUAL: 2.6 %
NEUTROPHILS # BLD AUTO: 5.66 K/UL (ref 1.82–7.42)
NEUTROPHILS NFR BLD: 85.1 % (ref 44–72)
NEUTS BAND NFR BLD MANUAL: 6.2 % (ref 0–10)
NRBC # BLD AUTO: 0.2 K/UL
NRBC BLD AUTO-RTO: 3.2 /100 WBC
PLATELET # BLD AUTO: 164 K/UL (ref 164–446)
PLATELET BLD QL SMEAR: NORMAL
PMV BLD AUTO: 9 FL (ref 9–12.9)
POIKILOCYTOSIS BLD QL SMEAR: NORMAL
POTASSIUM SERPL-SCNC: 4.8 MMOL/L (ref 3.6–5.5)
RBC # BLD AUTO: 3.02 M/UL (ref 4.7–6.1)
RBC BLD AUTO: PRESENT
SODIUM SERPL-SCNC: 129 MMOL/L (ref 135–145)
WBC # BLD AUTO: 6.2 K/UL (ref 4.8–10.8)

## 2017-08-22 PROCEDURE — 36415 COLL VENOUS BLD VENIPUNCTURE: CPT

## 2017-08-22 PROCEDURE — 700111 HCHG RX REV CODE 636 W/ 250 OVERRIDE (IP)

## 2017-08-22 PROCEDURE — 80053 COMPREHEN METABOLIC PANEL: CPT

## 2017-08-22 PROCEDURE — 700105 HCHG RX REV CODE 258: Performed by: HOSPITALIST

## 2017-08-22 PROCEDURE — 770004 HCHG ROOM/CARE - ONCOLOGY PRIVATE *

## 2017-08-22 PROCEDURE — 700102 HCHG RX REV CODE 250 W/ 637 OVERRIDE(OP): Performed by: HOSPITALIST

## 2017-08-22 PROCEDURE — 99232 SBSQ HOSP IP/OBS MODERATE 35: CPT | Performed by: HOSPITALIST

## 2017-08-22 PROCEDURE — A9270 NON-COVERED ITEM OR SERVICE: HCPCS | Performed by: HOSPITALIST

## 2017-08-22 PROCEDURE — 72158 MRI LUMBAR SPINE W/O & W/DYE: CPT

## 2017-08-22 PROCEDURE — 85027 COMPLETE CBC AUTOMATED: CPT

## 2017-08-22 PROCEDURE — 51798 US URINE CAPACITY MEASURE: CPT

## 2017-08-22 PROCEDURE — 700111 HCHG RX REV CODE 636 W/ 250 OVERRIDE (IP): Performed by: INTERNAL MEDICINE

## 2017-08-22 PROCEDURE — 73723 MRI JOINT LWR EXTR W/O&W/DYE: CPT | Mod: LT

## 2017-08-22 PROCEDURE — 700111 HCHG RX REV CODE 636 W/ 250 OVERRIDE (IP): Performed by: HOSPITALIST

## 2017-08-22 PROCEDURE — 85007 BL SMEAR W/DIFF WBC COUNT: CPT

## 2017-08-22 RX ORDER — TRAMADOL HYDROCHLORIDE 50 MG/1
50 TABLET ORAL EVERY 4 HOURS PRN
Status: ON HOLD | COMMUNITY
End: 2017-08-25

## 2017-08-22 RX ORDER — KETOROLAC TROMETHAMINE 30 MG/ML
15 INJECTION, SOLUTION INTRAMUSCULAR; INTRAVENOUS ONCE
Status: COMPLETED | OUTPATIENT
Start: 2017-08-22 | End: 2017-08-22

## 2017-08-22 RX ORDER — MIDAZOLAM HYDROCHLORIDE 1 MG/ML
INJECTION INTRAMUSCULAR; INTRAVENOUS
Status: DISPENSED
Start: 2017-08-22 | End: 2017-08-23

## 2017-08-22 RX ORDER — SODIUM CHLORIDE 9 MG/ML
INJECTION, SOLUTION INTRAVENOUS CONTINUOUS
Status: DISCONTINUED | OUTPATIENT
Start: 2017-08-22 | End: 2017-08-24

## 2017-08-22 RX ORDER — GABAPENTIN 300 MG/1
300 CAPSULE ORAL 3 TIMES DAILY
Status: ON HOLD | COMMUNITY
End: 2017-08-25

## 2017-08-22 RX ORDER — PREDNISONE 20 MG/1
40 TABLET ORAL ONCE
Status: COMPLETED | OUTPATIENT
Start: 2017-08-22 | End: 2017-08-22

## 2017-08-22 RX ADMIN — TRAMADOL HYDROCHLORIDE 50 MG: 50 TABLET, COATED ORAL at 06:18

## 2017-08-22 RX ADMIN — SODIUM CHLORIDE: 9 INJECTION, SOLUTION INTRAVENOUS at 21:45

## 2017-08-22 RX ADMIN — GABAPENTIN 300 MG: 300 CAPSULE ORAL at 20:04

## 2017-08-22 RX ADMIN — PREDNISONE 40 MG: 20 TABLET ORAL at 18:26

## 2017-08-22 RX ADMIN — KETOROLAC TROMETHAMINE 15 MG: 30 INJECTION, SOLUTION INTRAMUSCULAR at 08:13

## 2017-08-22 RX ADMIN — TRAMADOL HYDROCHLORIDE 50 MG: 50 TABLET, COATED ORAL at 12:54

## 2017-08-22 RX ADMIN — ONDANSETRON 4 MG: 2 INJECTION INTRAMUSCULAR; INTRAVENOUS at 20:04

## 2017-08-22 RX ADMIN — KETOROLAC TROMETHAMINE 15 MG: 30 INJECTION, SOLUTION INTRAMUSCULAR at 18:48

## 2017-08-22 RX ADMIN — SODIUM CHLORIDE: 9 INJECTION, SOLUTION INTRAVENOUS at 08:27

## 2017-08-22 RX ADMIN — TRAMADOL HYDROCHLORIDE 50 MG: 50 TABLET, COATED ORAL at 20:04

## 2017-08-22 RX ADMIN — STANDARDIZED SENNA CONCENTRATE AND DOCUSATE SODIUM 2 TABLET: 8.6; 5 TABLET, FILM COATED ORAL at 20:04

## 2017-08-22 RX ADMIN — GABAPENTIN 300 MG: 300 CAPSULE ORAL at 08:11

## 2017-08-22 ASSESSMENT — ENCOUNTER SYMPTOMS
VOMITING: 0
NAUSEA: 0
PALPITATIONS: 0
ABDOMINAL PAIN: 0

## 2017-08-22 ASSESSMENT — PAIN SCALES - GENERAL
PAINLEVEL_OUTOF10: 2
PAINLEVEL_OUTOF10: 4
PAINLEVEL_OUTOF10: 0
PAINLEVEL_OUTOF10: 3
PAINLEVEL_OUTOF10: 0
PAINLEVEL_OUTOF10: 0
PAINLEVEL_OUTOF10: 3

## 2017-08-22 NOTE — PROGRESS NOTES
Bed side report received from day RN, assumed patient care at 1900 Pt is A&Ox4. Fall precautions in place including, treaded socks, bed in lowest position, hourly rounding in place, provided with Rn and Cna phone numbers to call for assistance, full assessment completed and document,  call light within reach.  Discussed POC for the night to include pt to be NPO at 0600 8/22 for MRI with sedation and pain control,  patient agreeable.  PIV to right AC SL, site CDI.   Pt has no further questions at this time. Wife at bedside. Call light within reach.

## 2017-08-22 NOTE — CARE PLAN
Problem: Nutritional:  Goal: Achieve adequate nutritional intake  Patient will consume 50% of meals  Intervention: Advance diet as tolerated  PT NPO r/t MRI.

## 2017-08-22 NOTE — DIETARY
Nutrition Services -Poor PO PTA     58 YO M admitted r/t intractable back pain. PMH significant for metastatic prostate CA. PSH significant for prostatectomy. Ex-smoker. Dx: intractable CA-related back pain, chronic hyponatremia, hyperbilirubinemia.    Medication reviewed. Labs reviewed and significant for Na 129, glucose 146, AST/ALT 73/51, alkaline phosphatase 768. IV NS at 100 mL/hr. No pressure areas or edema documented at this time. Pt denies GI problems. Appearance is pale and fatigued. Mood seems depressed.    Weight is 84.6kg and BMI is 27.53kg/m2. Pt reports losing 7.4% weight x 3 weeks, which is significant loss. Pt reports poor PO PTA x 3 weeks r/t extreme back pain.<50% of estimated energy needs >5 days and this level of weight loss indicate nutrition diagnosis of severe malnutrition.     He feels very hungry at the moment, but he is NPO for an upcoming MRI.    Plan/Recommendation    Consider replacing electrolytes as needed.     If it is not medically feasible to advance to PO feedings within 48-72 hours, consider nutrition support to meet needs.    RD following

## 2017-08-22 NOTE — PROGRESS NOTES
[facility] Hospitalist Progress Note    Date of Service: 2017    Chief Complaint  57 y.o. male with history of advanced prostate cancer, metastasis known to the liver and bone admitted 2017 with intractable back pain    Interval Problem Update  Patient's pain is somewhat better controlled today, he thinks this is due to the Toradol  He has not wanted to take any of the narcotic pain medication, he has been sensitive to these in the past  Continues to his bilateral lower extremities normally  He has some low back pain radiating left side this is worse when he tries to move, rates it currently at 3/10    Consultants/Specialty  None    Disposition  To be determined        Review of Systems   Cardiovascular: Negative for chest pain and palpitations.   Gastrointestinal: Negative for nausea, vomiting and abdominal pain.   Skin: Negative for itching and rash.      Physical Exam  Laboratory/Imaging   Hemodynamics  Temp (24hrs), Av.4 °C (97.5 °F), Min:36.1 °C (97 °F), Max:36.5 °C (97.7 °F)   Temperature: 36.1 °C (97 °F)  Pulse  Av  Min: 104  Max: 106    Blood Pressure: 119/76 mmHg      Respiratory      Respiration: 16, Pulse Oximetry: 94 %        RUL Breath Sounds: Diminished, RML Breath Sounds: Diminished, RLL Breath Sounds: Diminished, CELIA Breath Sounds: Diminished, LLL Breath Sounds: Diminished    Fluids    Intake/Output Summary (Last 24 hours) at 17 1245  Last data filed at 17 0600   Gross per 24 hour   Intake    240 ml   Output    540 ml   Net   -300 ml       Nutrition  Orders Placed This Encounter   Procedures   • DIET NPO     Standing Status: Standing      Number of Occurrences: 1      Standing Expiration Date:      Order Specific Question:  Restrict to:     Answer:  Sips with Medications [3]     Physical Exam   Constitutional: He is oriented to person, place, and time. He appears well-nourished. No distress.   HENT:   Head: Normocephalic and atraumatic.   Eyes: Conjunctivae are normal.  Right eye exhibits no discharge. Left eye exhibits no discharge.   Cardiovascular: Normal rate, regular rhythm and intact distal pulses.    Pulmonary/Chest: Effort normal and breath sounds normal. No respiratory distress. He has no wheezes.   Abdominal: Soft. Bowel sounds are normal. He exhibits no distension. There is no rebound and no guarding.   Neurological: He is alert and oriented to person, place, and time. No cranial nerve deficit.   5/5 strength in lower extremities bilaterally  Sensation light touch intact   Skin: No rash noted. He is not diaphoretic. No erythema.   Nursing note and vitals reviewed.      Recent Labs      08/21/17   1444  08/21/17   2355   WBC  6.2  6.2   RBC  3.12*  3.02*   HEMOGLOBIN  8.7*  8.4*   HEMATOCRIT  26.9*  26.3*   MCV  86.2  87.1   MCH  27.9  27.8   MCHC  32.3*  31.9*   RDW  52.3*  51.8*   PLATELETCT  167  164   MPV  8.7*  9.0     Recent Labs      08/21/17   1444  08/21/17   2355   SODIUM  129*  129*   POTASSIUM  4.4  4.8   CHLORIDE  96  95*   CO2  22  24   GLUCOSE  140*  146*   BUN  21  23*   CREATININE  0.68  0.63   CALCIUM  8.8  9.1                      Assessment/Plan     * Intractable back pain (present on admission)  Assessment & Plan  Patient has intractable pain in his low back radiating to his left leg  Subacute presentation, this is been going on for several weeks   He has known bony metastasis from prostate cancer   MRI ordered for today  Some relief from Toradol  Prednisone was increased on admission  He is very hesitant taking narcotic pain medications continue tramadol     Prostate cancer metastatic to bone (CMS-HCC) (present on admission)  Assessment & Plan  Advanced disease with mets to liver and bone    Anemia (present on admission)  Assessment & Plan  Anemia is stable, no need for transfusion today    Hyponatremia (present on admission)  Assessment & Plan  Patient has chronic hyponatremia  Assess response to hydration  I doubt he is symptomatic from  this    Hyperbilirubinemia (present on admission)  Assessment & Plan  This could be a result of dehydration or due to known liver metastasis  Repeat labs tomorrow for hydration    Labs reviewed and Medications reviewed  Sanchez catheter: No Sanchez      DVT Prophylaxis: Enoxaparin (Lovenox)

## 2017-08-22 NOTE — CARE PLAN
Problem: Safety  Goal: Will remain free from falls  Intervention: Implement fall precautions  Call light in reach, hourly rounding in practice.       Problem: Pain Management  Goal: Pain level will decrease to patient’s comfort goal  Intervention: Follow pain managment plan developed in collaboration with patient and Interdisciplinary Team  Pt will report pain of less than 4/10.

## 2017-08-22 NOTE — ASSESSMENT & PLAN NOTE
Anemia is likely due to bone marrow infiltration  No need to check further CBC or transfuse blood products with his current goals of care

## 2017-08-22 NOTE — CARE PLAN
Problem: Pain Management  Goal: Pain level will decrease to patient’s comfort goal  Intervention: Follow pain managment plan developed in collaboration with patient and Interdisciplinary Team  Pt medicated per MAR for pain. Pt able to achieve relief and sleep.   Intervention: Educate and implement non-pharmacologic comfort measures. Examples: relaxation, distration, play therapy, activity therapy, massage, etc.  Other methods offered besides pharmaceutical; pt declined.

## 2017-08-22 NOTE — ASSESSMENT & PLAN NOTE
Patient has intractable pain in his low back radiating to his left leg  Subacute presentation, this is been going on for several weeks   MRI shows extensive bone and bone marrow metastasis  There is tumor affecting the nerve root at L5  Appreciate radiation oncology consult  Patient has elected not to have any radiation  Palliative care consulting  Patient desires home hospice, possibly home today or tomorrow when home DME and hospice can be arranged

## 2017-08-23 ENCOUNTER — HOSPICE ADMISSION (OUTPATIENT)
Dept: HOSPICE | Facility: HOSPICE | Age: 57
End: 2017-08-23
Payer: COMMERCIAL

## 2017-08-23 LAB
ABO GROUP BLD: NORMAL
ALBUMIN SERPL BCP-MCNC: 2.7 G/DL (ref 3.2–4.9)
ALBUMIN/GLOB SERPL: 0.8 G/DL
ALP SERPL-CCNC: 613 U/L (ref 30–99)
ALT SERPL-CCNC: 39 U/L (ref 2–50)
ANION GAP SERPL CALC-SCNC: 7 MMOL/L (ref 0–11.9)
ANISOCYTOSIS BLD QL SMEAR: ABNORMAL
ANISOCYTOSIS BLD QL SMEAR: ABNORMAL
AST SERPL-CCNC: 50 U/L (ref 12–45)
BARCODED ABORH UBTYP: 600
BARCODED PRD CODE UBPRD: NORMAL
BARCODED UNIT NUM UBUNT: NORMAL
BASOPHILS # BLD AUTO: 0 % (ref 0–1.8)
BASOPHILS # BLD AUTO: 0 % (ref 0–1.8)
BASOPHILS # BLD: 0 K/UL (ref 0–0.12)
BASOPHILS # BLD: 0 K/UL (ref 0–0.12)
BILIRUB SERPL-MCNC: 1.3 MG/DL (ref 0.1–1.5)
BLD GP AB SCN SERPL QL: NORMAL
BUN SERPL-MCNC: 31 MG/DL (ref 8–22)
CALCIUM SERPL-MCNC: 8.2 MG/DL (ref 8.5–10.5)
CHLORIDE SERPL-SCNC: 101 MMOL/L (ref 96–112)
CO2 SERPL-SCNC: 23 MMOL/L (ref 20–33)
COMPONENT R 8504R: NORMAL
CREAT SERPL-MCNC: 0.8 MG/DL (ref 0.5–1.4)
EOSINOPHIL # BLD AUTO: 0 K/UL (ref 0–0.51)
EOSINOPHIL # BLD AUTO: 0 K/UL (ref 0–0.51)
EOSINOPHIL NFR BLD: 0 % (ref 0–6.9)
EOSINOPHIL NFR BLD: 0 % (ref 0–6.9)
ERYTHROCYTE [DISTWIDTH] IN BLOOD BY AUTOMATED COUNT: 51.3 FL (ref 35.9–50)
ERYTHROCYTE [DISTWIDTH] IN BLOOD BY AUTOMATED COUNT: 53.4 FL (ref 35.9–50)
GFR SERPL CREATININE-BSD FRML MDRD: >60 ML/MIN/1.73 M 2
GIANT PLATELETS BLD QL SMEAR: NORMAL
GLOBULIN SER CALC-MCNC: 3.4 G/DL (ref 1.9–3.5)
GLUCOSE SERPL-MCNC: 106 MG/DL (ref 65–99)
HCT VFR BLD AUTO: 22 % (ref 42–52)
HCT VFR BLD AUTO: 23.5 % (ref 42–52)
HGB BLD-MCNC: 7 G/DL (ref 14–18)
HGB BLD-MCNC: 7.6 G/DL (ref 14–18)
LYMPHOCYTES # BLD AUTO: 0.08 K/UL (ref 1–4.8)
LYMPHOCYTES # BLD AUTO: 0.22 K/UL (ref 1–4.8)
LYMPHOCYTES NFR BLD: 1.8 % (ref 22–41)
LYMPHOCYTES NFR BLD: 5.3 % (ref 22–41)
MANUAL DIFF BLD: ABNORMAL
MANUAL DIFF BLD: NORMAL
MCH RBC QN AUTO: 27.9 PG (ref 27–33)
MCH RBC QN AUTO: 28.7 PG (ref 27–33)
MCHC RBC AUTO-ENTMCNC: 31.8 G/DL (ref 33.7–35.3)
MCHC RBC AUTO-ENTMCNC: 32.3 G/DL (ref 33.7–35.3)
MCV RBC AUTO: 87.6 FL (ref 81.4–97.8)
MCV RBC AUTO: 88.7 FL (ref 81.4–97.8)
METAMYELOCYTES NFR BLD MANUAL: 1.8 %
METAMYELOCYTES NFR BLD MANUAL: 1.8 %
MICROCYTES BLD QL SMEAR: ABNORMAL
MICROCYTES BLD QL SMEAR: ABNORMAL
MONOCYTES # BLD AUTO: 0.13 K/UL (ref 0–0.85)
MONOCYTES # BLD AUTO: 0.18 K/UL (ref 0–0.85)
MONOCYTES NFR BLD AUTO: 2.8 % (ref 0–13.4)
MONOCYTES NFR BLD AUTO: 4.4 % (ref 0–13.4)
MORPHOLOGY BLD-IMP: NORMAL
MORPHOLOGY BLD-IMP: NORMAL
NEUTROPHILS # BLD AUTO: 3.72 K/UL (ref 1.82–7.42)
NEUTROPHILS # BLD AUTO: 4.31 K/UL (ref 1.82–7.42)
NEUTROPHILS NFR BLD: 57.5 % (ref 44–72)
NEUTROPHILS NFR BLD: 89.9 % (ref 44–72)
NEUTS BAND NFR BLD MANUAL: 3.7 % (ref 0–10)
NEUTS BAND NFR BLD MANUAL: 31 % (ref 0–10)
NRBC # BLD AUTO: 0.12 K/UL
NRBC # BLD AUTO: 0.16 K/UL
NRBC BLD AUTO-RTO: 2.6 /100 WBC
NRBC BLD AUTO-RTO: 3.8 /100 WBC
OVALOCYTES BLD QL SMEAR: NORMAL
PLATELET # BLD AUTO: 116 K/UL (ref 164–446)
PLATELET # BLD AUTO: 137 K/UL (ref 164–446)
PLATELET BLD QL SMEAR: NORMAL
PLATELET BLD QL SMEAR: NORMAL
PMV BLD AUTO: 8.4 FL (ref 9–12.9)
PMV BLD AUTO: 8.7 FL (ref 9–12.9)
POIKILOCYTOSIS BLD QL SMEAR: NORMAL
POLYCHROMASIA BLD QL SMEAR: NORMAL
POTASSIUM SERPL-SCNC: 4.7 MMOL/L (ref 3.6–5.5)
PRODUCT TYPE UPROD: NORMAL
PROT SERPL-MCNC: 6.1 G/DL (ref 6–8.2)
RBC # BLD AUTO: 2.51 M/UL (ref 4.7–6.1)
RBC # BLD AUTO: 2.65 M/UL (ref 4.7–6.1)
RBC BLD AUTO: PRESENT
RBC BLD AUTO: PRESENT
RH BLD: NORMAL
SODIUM SERPL-SCNC: 131 MMOL/L (ref 135–145)
UNIT STATUS USTAT: NORMAL
WBC # BLD AUTO: 4.2 K/UL (ref 4.8–10.8)
WBC # BLD AUTO: 4.6 K/UL (ref 4.8–10.8)

## 2017-08-23 PROCEDURE — A9270 NON-COVERED ITEM OR SERVICE: HCPCS | Performed by: HOSPITALIST

## 2017-08-23 PROCEDURE — 99233 SBSQ HOSP IP/OBS HIGH 50: CPT | Performed by: HOSPITALIST

## 2017-08-23 PROCEDURE — 36430 TRANSFUSION BLD/BLD COMPNT: CPT

## 2017-08-23 PROCEDURE — 86850 RBC ANTIBODY SCREEN: CPT

## 2017-08-23 PROCEDURE — P9016 RBC LEUKOCYTES REDUCED: HCPCS

## 2017-08-23 PROCEDURE — 770004 HCHG ROOM/CARE - ONCOLOGY PRIVATE *

## 2017-08-23 PROCEDURE — 30233N1 TRANSFUSION OF NONAUTOLOGOUS RED BLOOD CELLS INTO PERIPHERAL VEIN, PERCUTANEOUS APPROACH: ICD-10-PCS | Performed by: HOSPITALIST

## 2017-08-23 PROCEDURE — 85007 BL SMEAR W/DIFF WBC COUNT: CPT

## 2017-08-23 PROCEDURE — 86644 CMV ANTIBODY: CPT

## 2017-08-23 PROCEDURE — 700111 HCHG RX REV CODE 636 W/ 250 OVERRIDE (IP): Performed by: HOSPITALIST

## 2017-08-23 PROCEDURE — 700111 HCHG RX REV CODE 636 W/ 250 OVERRIDE (IP): Performed by: INTERNAL MEDICINE

## 2017-08-23 PROCEDURE — 700102 HCHG RX REV CODE 250 W/ 637 OVERRIDE(OP): Performed by: HOSPITALIST

## 2017-08-23 PROCEDURE — 86923 COMPATIBILITY TEST ELECTRIC: CPT

## 2017-08-23 PROCEDURE — 36415 COLL VENOUS BLD VENIPUNCTURE: CPT

## 2017-08-23 PROCEDURE — 86901 BLOOD TYPING SEROLOGIC RH(D): CPT

## 2017-08-23 PROCEDURE — 85027 COMPLETE CBC AUTOMATED: CPT

## 2017-08-23 PROCEDURE — 80053 COMPREHEN METABOLIC PANEL: CPT

## 2017-08-23 PROCEDURE — 99222 1ST HOSP IP/OBS MODERATE 55: CPT | Performed by: RADIOLOGY

## 2017-08-23 PROCEDURE — 86900 BLOOD TYPING SEROLOGIC ABO: CPT

## 2017-08-23 PROCEDURE — 700105 HCHG RX REV CODE 258: Performed by: HOSPITALIST

## 2017-08-23 RX ADMIN — STANDARDIZED SENNA CONCENTRATE AND DOCUSATE SODIUM 2 TABLET: 8.6; 5 TABLET, FILM COATED ORAL at 08:38

## 2017-08-23 RX ADMIN — TRAMADOL HYDROCHLORIDE 50 MG: 50 TABLET, COATED ORAL at 21:45

## 2017-08-23 RX ADMIN — TRAMADOL HYDROCHLORIDE 50 MG: 50 TABLET, COATED ORAL at 13:16

## 2017-08-23 RX ADMIN — KETOROLAC TROMETHAMINE 15 MG: 30 INJECTION, SOLUTION INTRAMUSCULAR at 21:41

## 2017-08-23 RX ADMIN — GABAPENTIN 300 MG: 300 CAPSULE ORAL at 15:13

## 2017-08-23 RX ADMIN — SODIUM CHLORIDE: 9 INJECTION, SOLUTION INTRAVENOUS at 06:37

## 2017-08-23 RX ADMIN — STANDARDIZED SENNA CONCENTRATE AND DOCUSATE SODIUM 2 TABLET: 8.6; 5 TABLET, FILM COATED ORAL at 21:41

## 2017-08-23 RX ADMIN — ONDANSETRON 4 MG: 4 TABLET, ORALLY DISINTEGRATING ORAL at 12:57

## 2017-08-23 RX ADMIN — TRAMADOL HYDROCHLORIDE 50 MG: 50 TABLET, COATED ORAL at 06:34

## 2017-08-23 RX ADMIN — KETOROLAC TROMETHAMINE 15 MG: 30 INJECTION, SOLUTION INTRAMUSCULAR at 15:14

## 2017-08-23 RX ADMIN — GABAPENTIN 300 MG: 300 CAPSULE ORAL at 08:37

## 2017-08-23 RX ADMIN — GABAPENTIN 300 MG: 300 CAPSULE ORAL at 21:41

## 2017-08-23 RX ADMIN — PREDNISONE 40 MG: 20 TABLET ORAL at 08:38

## 2017-08-23 ASSESSMENT — PAIN SCALES - GENERAL
PAINLEVEL_OUTOF10: 3
PAINLEVEL_OUTOF10: 4
PAINLEVEL_OUTOF10: 2
PAINLEVEL_OUTOF10: 2
PAINLEVEL_OUTOF10: 3

## 2017-08-23 ASSESSMENT — ENCOUNTER SYMPTOMS
NAUSEA: 0
PALPITATIONS: 0
ABDOMINAL PAIN: 0
VOMITING: 0

## 2017-08-23 NOTE — PROGRESS NOTES
Bed side report received from day RN, assumed patient care at 1900 Pt is A&Ox4. Fall precautions in place including  bed in lowest position, hourly rounding in place, provided with Rn and Cna phone numbers to call for assistance, full assessment completed and documented in [facility], VSS.  Call light within reach.  Discussed POC for the night patient agreeable, wife at bedside. Pt has no further questions at this time.  Left PIV infusing NS at 100. Medicated per MAR, pt c/o nausea and pain to left hip/lower back.

## 2017-08-23 NOTE — CONSULTS
RADIATION ONCOLOGY CONSULT    DATE OF SERVICE: 8/23/2017    IDENTIFICATION: A 57 y.o. male with widely metastatic prostate cancer to the bone to the liver.  He is here at the kind request of Dr. Rosales and Albert.     HISTORY OF PRESENT ILLNESS: Patient has widely metastatic prostate cancer now with continued progressive disease into the liver into the spine. Most recent pain has been in the lower back throughout the entire lumbar spine and radiating down to the left leg. He was admitted the hospital for pain control most recently an MRI scan of lumbar spine and left hip were done. The findings on the lumbar spine showed diffuse bone metastases there is an anterior epidural tumor extension at the levels of L4-5 causing effacement of the thecal sac there is mild central canal stenosis there is effacement of the lateral recess of the level of L5 the tumor is abutting the exiting left L5 nerve root again the hyperintensities in the liver are represented in the scan as well. The MRI scan of the left hip also shows extensive marrow replacement consistent with diffuse metastatic disease. Currently patient is lethargic he's requiring another blood transfusion his pain is barely under control is having difficulty walking.    PAST MEDICAL HISTORY:   Past Medical History   Diagnosis Date   • Cancer (CMS-HCC) 2007     prostate   • Breath shortness    • Claustrophobia        PAST SURGICAL HISTORY:  Past Surgical History   Procedure Laterality Date   • Prostatectomy, radial  2007   • Recovery  11/23/2015     Procedure: CT-CT GUIDED LEFT LUNG BIOPSY**Per **;  Surgeon: Recoveryonly Surgery;  [place] [facility] [place]  Service:        GYNECOLOGICAL STATUS:  CURRENT MEDICATIONS:  Current Facility-Administered Medications   Medication Dose Route Frequency Provider Last Rate Last Dose   • NS infusion   Intravenous Continuous Mitchel Rodrigues M.D. 100 mL/hr at 08/23/17 0637     • ketorolac (TORADOL)  injection 15 mg  15 mg Intravenous Q8HRS Lisandro Price M.D.   Stopped at 08/22/17 1400   • enoxaparin (LOVENOX) inj 40 mg  40 mg Subcutaneous DAILY Lisandro Price M.D.   40 mg at 08/21/17 2100   • senna-docusate (PERICOLACE or SENOKOT S) 8.6-50 MG per tablet 2 Tab  2 Tab Oral BID Mitchel Rodrigues M.D.   2 Tab at 08/23/17 0838    And   • polyethylene glycol/lytes (MIRALAX) PACKET 1 Packet  1 Packet Oral QDAY PRN Mitchel Rodrigues M.D.        And   • magnesium hydroxide (MILK OF MAGNESIA) suspension 30 mL  30 mL Oral QDAY PRJOSE RAFAEL Rodrigues M.D.        And   • bisacodyl (DULCOLAX) suppository 10 mg  10 mg Rectal QDAY PRJOSE RAFAEL Rodrigues M.D.       • ondansetron (ZOFRAN) syringe/vial injection 4 mg  4 mg Intravenous Q4HRS PRJOSE RAFAEL Rodrigues M.D.   4 mg at 08/22/17 2004   • ondansetron (ZOFRAN ODT) dispertab 4 mg  4 mg Oral Q4HRS PRJOSE RAFAEL Rodrigues M.D.       • promethazine (PHENERGAN) tablet 12.5-25 mg  12.5-25 mg Oral Q4HRS PRJOSE RAFAEL Rodrigues M.D.       • promethazine (PHENERGAN) suppository 12.5-25 mg  12.5-25 mg Rectal Q4HRS PRJOSE RAFAEL Rodrigues M.D.       • prochlorperazine (COMPAZINE) injection 5-10 mg  5-10 mg Intravenous Q4HRS PRJOSE RAFAEL Rodrigues M.D.       • gabapentin (NEURONTIN) capsule 300 mg  300 mg Oral TID Mitchel Rodrigues M.D.   300 mg at 08/23/17 0837   • tramadol (ULTRAM) 50 MG tablet 50 mg  50 mg Oral Q6HRS PRJOSE RAFAEL Rodrigues M.D.   50 mg at 08/23/17 0634   • oxycodone immediate-release (ROXICODONE) tablet 5 mg  5 mg Oral Q3HRS PRN Mitchel Rodrigues M.D.       • predniSONE (DELTASONE) tablet 40 mg  40 mg Oral DAILY Mitchel Rodrigues M.D.   40 mg at 08/23/17 0838   • HYDROmorphone (DILAUDID) injection 0.25-1 mg  0.25-1 mg Intravenous Q2HRS PRN Mitchel Rodrigues M.D.           ALLERGIES:    Review of patient's allergies indicates no known allergies.    FAMILY HISTORY:    Family History   Problem Relation Age of Onset   • Cancer Father      lung cancer   [unfilled]        SOCIAL HISTORY:     reports that he quit smoking about 32 years  ago. His smoking use included Cigarettes. He has a 15 pack-year smoking history. He uses smokeless tobacco. He reports that he does not drink alcohol or use illicit drugs.  [place]      REVIEW OF SYSTEMS:   General has fatigue is extreme chills and weight loss and lack of appetite  Psych patient is depressed but does understand that his prognosis is terminal  GI has decreased appetite  : Negative no dysuria or hematuria  GI: Decreased appetite  Musculoskeletal: Pain throughout the entire lumbar spine and left hip down his left leg  Endocrine: Denies diabetes or thyroid disease  Skin: She has jaundice  Neuro: Denies any neurologic issues or seizures    PHYSICAL EXAM:    4=Completely disabled.  Cannot carry on any self care.  Totally confined to bed or chair.  Filed Vitals:    08/22/17 2000 08/23/17 0000 08/23/17 0400 08/23/17 0800   BP: 112/74 101/61 110/69 108/69   Pulse: 109 109 103 106   Temp: 36.4 °C (97.5 °F) 36.1 °C (97 °F) 36.6 °C (97.8 °F) 36.2 °C (97.2 °F)   Resp: 18 18 18 18   Height:       Weight:       SpO2: 98% 97% 97% 96%   Location: Back  Description: Sore      What makes the pain better:Steroids and pain medication  What makes the pain worse: Ambulation  Pain controlled with current regimen: yes  Pain related to condition being seen here for: yes    GENERAL: Lying in bed lethargic aware of this situation alert and oriented ×3  Skin: Jaundice  HEENT:  Pupils are equal, round, and reactive to light.  Extraocular muscles   are intact. Sclerae icteric.  Conjunctivae pink.  Oral cavity, tongue   protrudes midline.   NECK:  Supple without evidence of thyromegaly.  NODES:  No peripheral adenopathy of the neck, supraclavicular fossa or axillae   bilaterally.  LUNGS:  Clear to ascultation   HEART:  Regular rate and rhythm.  No murmur appreciated  ABDOMEN:  Soft. No evidence of hepatosplenomegaly.  Positive bowel sounds.  EXTREMITIES:  Without Edema.  NEUROLOGIC:  Cranial nerves II  through XII were intact. Normal stance and gait motor and sensory grossly within normal limits  Muscular skeletal: Pain in the lumbar spine and left hip, and right shoulder      LABORATORY DATA:   Lab Results   Component Value Date/Time    SODIUM 131* 08/22/2017 11:42 PM    POTASSIUM 4.7 08/22/2017 11:42 PM    CHLORIDE 101 08/22/2017 11:42 PM    CO2 23 08/22/2017 11:42 PM    GLUCOSE 106* 08/22/2017 11:42 PM    BUN 31* 08/22/2017 11:42 PM    CREATININE 0.80 08/22/2017 11:42 PM     Lab Results   Component Value Date/Time    ALKALINE PHOSPHATASE 613* 08/22/2017 11:42 PM    AST(SGOT) 50* 08/22/2017 11:42 PM    ALT(SGPT) 39 08/22/2017 11:42 PM    TOTAL BILIRUBIN 1.3 08/22/2017 11:42 PM      Lab Results   Component Value Date/Time    WBC 4.2* 08/22/2017 11:42 PM    RBC 2.51* 08/22/2017 11:42 PM    HEMOGLOBIN 7.0* 08/22/2017 11:42 PM    HEMATOCRIT 22.0* 08/22/2017 11:42 PM    MCV 87.6 08/22/2017 11:42 PM    MCH 27.9 08/22/2017 11:42 PM    MCHC 31.8* 08/22/2017 11:42 PM    MPV 8.4* 08/22/2017 11:42 PM    NEUTROPHILS-POLYS 57.50 08/22/2017 11:42 PM    LYMPHOCYTES 5.30* 08/22/2017 11:42 PM    MONOCYTES 4.40 08/22/2017 11:42 PM    EOSINOPHILS 0.00 08/22/2017 11:42 PM    BASOPHILS 0.00 08/22/2017 11:42 PM    ANISOCYTOSIS 2+ 08/22/2017 11:42 PM            IMPRESSION:    A 57 y.o. withMetastatic prostate cancer diffusely.      RECOMMENDATIONS:   Discussed with the patient and his wife that I could give a palliative course of radiation therapy to the lumbar spine and possibly to the left hip. Understands would be anywhere between 1-5 treatments and would require him having to come in for the treatments. He understands all about radiation therapy and the fact that he have to move on to a very hard table remain still and after a long discussion he has decided that he would not like to proceed with any further therapy and would like to talk to hospice.  I did describe the details of radiation along with the side effects both acute  and chronic, including but not exclusive to fatigue, skin reaction, local soreness, swelling, delayed healing, scarring fibrosis discoloration. Ample time was allowed for questions, and patient understands.    30 minutes was spent face-to-face with patient in the office and more than half of that time was spent counseling patient or coordinating care as described above.  The patient has wife noted they can get ahold of me if they do change their mind on any of this.  Thank you for the opportunity to participate in his care.  If any questions or comments, please do not hesitate in calling.    Please note that this dictation was created using voice recognition software. I have made every reasonable attempt to correct obvious errors, but I expect that there are errors of grammar and possibly content that I did not discover before finalizing the note.

## 2017-08-23 NOTE — CARE PLAN
Problem: Safety  Goal: Will remain free from falls  Intervention: Implement fall precautions  DME in the bathroom.       Problem: Pain Management  Goal: Pain level will decrease to patient’s comfort goal  Intervention: Follow pain managment plan developed in collaboration with patient and Interdisciplinary Team  Pt will report pain of less than 4/10.

## 2017-08-23 NOTE — CONSULTS
"Reason for PC Consult: Advance Care Planning    Consulted by: Dr. Rodrigues, Hosp    Assessment:  General: 58 y/o male with metastatic prostate cancer to liver and bone admitted for intractable back pain. Discussions with the MDs has resulted in possible plan for home with hospice. No notable PMHx.     Consults: Rad onc    Dyspnea: No-    Last BM: 08/23/17-    Pain: No- Pt reports pain well controlled w/current regimen; Dr. Bush to review as well  Depression: Mood appropriate for situation-    Dementia: No;       Spiritual:  Is Spiritism or spirituality important for coping with this illness? Yes- Pt will bring private .   Has a  or spiritual provider visit been requested? No    Palliative Performance Scale: 40%    Advance Directive: None-    DPOA: No- NOCATIE is wife Diana    POLST: No- will plan to complete prior to DC     Code Status: Full- Pt wishes to be a DNR.    Outcome:  PC RN met with Rhys and Diana at bedside and explained the role of PC. They are familiar to the PC team from previous visits. Dr. Doss was in the room at time of visit and explaining her role in his treatment plan. It was felt that XRT would be more burdensome than beneficial for Rhys given the extent of disease. He and Diana felt that hospice was most appropriate at this time given his limited options and lack of QOL. He stated \"I could do chemo and possibly live for 2 more years, but I don't want to do this for 2 more years\" in reference to his discomfort and lack of enjoyment. PC RN explained the role of hospice, their overall goals and how they could support the patient and family through this situation. They recall having done hospice with Cristiana's mom and understand their role. They expressed understanding with the goal of not returning to the hospital for further care. PC RN discussed code status as Rhys is currently full code yet wishing for EOL care. PC RN explained full code vs DNR at length and after discussion, he " and Diana felt DNR was most appropriate. PC RN reminded them that while he is here and receiving treatment, this changes nothing else in his POC; they verbalized understanding. The patient was currently awaiting an answer on whether [facility] would be able to accept him d/t the location of their home but expressed this as their wishes for POC moving forward. PC RN assessed Rhys's pain regime, which at this point was effective. PC RN offered to have the PC MD review his medications and possibly suggest other forms of pain control to allow him have better pain control and continue to be participatory; they were agreeable to this.     Both Diana and Rhys were tearful and reflected on having been  since they were 14 and 17 years old. They have 2 children and 4 grandchildren who live near them as well and are involved in Rhys's POC. PC RN inquired about wishes for a  visit and they expressed having a friend who will be visiting to provide spiritual support. They denied any further need at this time. PC RN offered to visit tomorrow and they were agreeable.    While talking to Rhys and Diana, PC RN provided active listening, normalization, validation of thoughts and feelings, as well as reinforced his goals of care. PC contact information provided to the couple and encouraged to call with any questions or concerns.     Updated: MD/RN/RAMÓN RADFORD    Plan: code status change once confirmed; PC medication review; home with hospice if accepted    Thank you for allowing Palliative Care to participate in this patient's care. Please feel free to call [phone] with any questions or concerns.

## 2017-08-23 NOTE — DISCHARGE PLANNING
Received choice form from PROMISE Cosby for Hospice. Referral sent to [facility] at 1443.

## 2017-08-23 NOTE — PROGRESS NOTES
[facility]ist Progress Note    Date of Service: 2017    Chief Complaint  57 y.o. male with history of advanced prostate cancer, metastasis known to the liver and bone admitted 2017 with intractable back pain    Interval Problem Update  MRI yesterday, reviewed the interpretation of results today with the patient and his wife  His pain is a little better controlled, still has left sided low back pain radiating to the left leg  Not taking narcotic pain medications by his choice  No N/V  Feels a little bit lightheaded when he stands up, tachy to 100's    Consultants/Specialty  None    Disposition  To be determined        Review of Systems   Cardiovascular: Negative for chest pain and palpitations.   Gastrointestinal: Negative for nausea, vomiting and abdominal pain.   Skin: Negative for itching and rash.      Physical Exam  Laboratory/Imaging   Hemodynamics  Temp (24hrs), Av.3 °C (97.3 °F), Min:36.1 °C (97 °F), Max:36.6 °C (97.8 °F)   Temperature: 36.2 °C (97.2 °F)  Pulse  Av  Min: 103  Max: 113    Blood Pressure: 108/69 mmHg, NIBP: (!) 94/65 mmHg      Respiratory      Respiration: 18, Pulse Oximetry: 96 %        RUL Breath Sounds: Diminished, RML Breath Sounds: Diminished, RLL Breath Sounds: Diminished, CELIA Breath Sounds: Diminished, LLL Breath Sounds: Diminished    Fluids    Intake/Output Summary (Last 24 hours) at 17 1205  Last data filed at 17 0635   Gross per 24 hour   Intake   2458 ml   Output    600 ml   Net   1858 ml       Nutrition  Orders Placed This Encounter   Procedures   • DIET ORDER     Standing Status: Standing      Number of Occurrences: 1      Standing Expiration Date:      Order Specific Question:  Diet:     Answer:  Regular [1]     Physical Exam   Constitutional: He is oriented to person, place, and time. He appears well-nourished. No distress.   HENT:   Head: Normocephalic and atraumatic.   Eyes: Conjunctivae are normal. No scleral icterus.   Neck: Normal range  of motion. Neck supple.   Cardiovascular: Normal rate, regular rhythm and intact distal pulses.    Pulmonary/Chest: Effort normal and breath sounds normal. No respiratory distress. He has no wheezes.   Abdominal: Soft. Bowel sounds are normal. He exhibits no distension. There is no rebound and no guarding.   Musculoskeletal: He exhibits no edema.   Subjectively 5/5 strength in left LE   Neurological: He is alert and oriented to person, place, and time. No cranial nerve deficit.   5/5 strength in lower extremities bilaterally  Sensation light touch intact   Skin: No rash noted. He is not diaphoretic. No erythema.   Nursing note and vitals reviewed.      Recent Labs      08/21/17   1444  08/21/17   2355  08/22/17   2342   WBC  6.2  6.2  4.2*   RBC  3.12*  3.02*  2.51*   HEMOGLOBIN  8.7*  8.4*  7.0*   HEMATOCRIT  26.9*  26.3*  22.0*   MCV  86.2  87.1  87.6   MCH  27.9  27.8  27.9   MCHC  32.3*  31.9*  31.8*   RDW  52.3*  51.8*  53.4*   PLATELETCT  167  164  137*   MPV  8.7*  9.0  8.4*     Recent Labs      08/21/17   1444  08/21/17 2355  08/22/17   2342   SODIUM  129*  129*  131*   POTASSIUM  4.4  4.8  4.7   CHLORIDE  96  95*  101   CO2  22  24  23   GLUCOSE  140*  146*  106*   BUN  21  23*  31*   CREATININE  0.68  0.63  0.80   CALCIUM  8.8  9.1  8.2*                      Assessment/Plan     * Intractable back pain (present on admission)  Assessment & Plan  Patient has intractable pain in his low back radiating to his left leg  Subacute presentation, this is been going on for several weeks   MRI shows extensive bone and bone marrow metastasis  There is tumor affecting the nerve root at L5  Discussed with Dr. Doss who will see the patient    Anemia (present on admission)  Assessment & Plan  Anemia is likely due to bone marrow infiltration  Hemoglobin dropped to 7, he is symptomatic with tachycardia  Transfuse 1 unit PRBC's now    Prostate cancer metastatic to bone (CMS-HCC) (present on admission)  Assessment &  Plan  Advanced disease with mets to liver and bone  Patient deciding if he wants to do chemo  Is known to our palliative care service, he requests to speak with them    Hyponatremia (present on admission)  Assessment & Plan  Improved    Hyperbilirubinemia (present on admission)  Assessment & Plan  Improved with hydration      Labs reviewed, Medications reviewed and Radiology images reviewed  Sanchez catheter: No Sanchez      DVT Prophylaxis: Enoxaparin (Lovenox)

## 2017-08-23 NOTE — DISCHARGE PLANNING
Medical SW    Referral: Sw spoke to bedside RN who indicates family is considering possible [facility] in near future. Pt considering and would like information. They would probably choose [facility] as they know someone who works there.     Intervention: They live in [place] which is [place] from [facility] on the way to [facility]. They carry [employer] medical insurance.  Sw spoke to Margo w/ [facility] who indicates they accept the insurance. She will speak w/ medical director to see if they have staff to cover an address at that distance.    Sw provided address to [facility] to verify if they are able to provide services at that distance.    Sw advised [facility] can accept pt if he is appropriate for hospice.    Sw met w/ pt and wife at bedside. Sw verified demographic information off face sheet. The address is correct and the contact number is pt's wife's. The MD is ONC Dr Doss. They choose [facility] and request the RN Geovanna Fernandes to be assigned. Pt indicates he would feel comfortable dying w/ this RN. Wife indicates they will have family (adult offspring and their spouses who live on the property too) covering care giving when hospice RN and CNA are not in the home.    Pt allowed wife to complete document. Karen faxed to Chace KAPOOR. Karen received fax confirmation. Sw left  w/ Chace.     Sw advised pt's insurance not contracted w/ [facility]. The pt would have to pay $15 per discipline visit. They are working w/ insurance and asking if they can be contracted for this one case. Karen provided information and contact at [facility] to family. They will follow up w/ Margo [phone].     Karen spoke to Hospice RN Makenzie who indicates they can take pt but it may be considered a conflict of interest to have their friend be the visiting RN.       Plan: Sw to assist w/ d/c planning as needed.

## 2017-08-24 ENCOUNTER — HOME CARE VISIT (OUTPATIENT)
Dept: HOSPICE | Facility: HOSPICE | Age: 57
End: 2017-08-24
Payer: COMMERCIAL

## 2017-08-24 LAB
ALBUMIN SERPL BCP-MCNC: 2.6 G/DL (ref 3.2–4.9)
ALBUMIN/GLOB SERPL: 0.9 G/DL
ALP SERPL-CCNC: 676 U/L (ref 30–99)
ALT SERPL-CCNC: 47 U/L (ref 2–50)
ANION GAP SERPL CALC-SCNC: 10 MMOL/L (ref 0–11.9)
ANISOCYTOSIS BLD QL SMEAR: ABNORMAL
AST SERPL-CCNC: 67 U/L (ref 12–45)
BASO STIPL BLD QL SMEAR: NORMAL
BASOPHILS # BLD AUTO: 0 % (ref 0–1.8)
BASOPHILS # BLD: 0 K/UL (ref 0–0.12)
BILIRUB SERPL-MCNC: 1 MG/DL (ref 0.1–1.5)
BUN SERPL-MCNC: 29 MG/DL (ref 8–22)
CALCIUM SERPL-MCNC: 8.3 MG/DL (ref 8.5–10.5)
CHLORIDE SERPL-SCNC: 104 MMOL/L (ref 96–112)
CO2 SERPL-SCNC: 22 MMOL/L (ref 20–33)
CREAT SERPL-MCNC: 0.61 MG/DL (ref 0.5–1.4)
EOSINOPHIL # BLD AUTO: 0.04 K/UL (ref 0–0.51)
EOSINOPHIL NFR BLD: 0.9 % (ref 0–6.9)
ERYTHROCYTE [DISTWIDTH] IN BLOOD BY AUTOMATED COUNT: 51.3 FL (ref 35.9–50)
GFR SERPL CREATININE-BSD FRML MDRD: >60 ML/MIN/1.73 M 2
GLOBULIN SER CALC-MCNC: 2.9 G/DL (ref 1.9–3.5)
GLUCOSE SERPL-MCNC: 97 MG/DL (ref 65–99)
HCT VFR BLD AUTO: 23.5 % (ref 42–52)
HGB BLD-MCNC: 7.4 G/DL (ref 14–18)
LYMPHOCYTES # BLD AUTO: 0.2 K/UL (ref 1–4.8)
LYMPHOCYTES NFR BLD: 4.4 % (ref 22–41)
MANUAL DIFF BLD: NORMAL
MCH RBC QN AUTO: 28 PG (ref 27–33)
MCHC RBC AUTO-ENTMCNC: 31.5 G/DL (ref 33.7–35.3)
MCV RBC AUTO: 89 FL (ref 81.4–97.8)
METAMYELOCYTES NFR BLD MANUAL: 1.7 %
MICROCYTES BLD QL SMEAR: ABNORMAL
MONOCYTES # BLD AUTO: 0.19 K/UL (ref 0–0.85)
MONOCYTES NFR BLD AUTO: 4.3 % (ref 0–13.4)
MORPHOLOGY BLD-IMP: NORMAL
NEUTROPHILS # BLD AUTO: 3.99 K/UL (ref 1.82–7.42)
NEUTROPHILS NFR BLD: 82.6 % (ref 44–72)
NEUTS BAND NFR BLD MANUAL: 6.1 % (ref 0–10)
NRBC # BLD AUTO: 0.24 K/UL
NRBC BLD AUTO-RTO: 5.3 /100 WBC
OVALOCYTES BLD QL SMEAR: NORMAL
PLATELET # BLD AUTO: 123 K/UL (ref 164–446)
PLATELET BLD QL SMEAR: NORMAL
PMV BLD AUTO: 8.5 FL (ref 9–12.9)
POIKILOCYTOSIS BLD QL SMEAR: NORMAL
POLYCHROMASIA BLD QL SMEAR: NORMAL
POTASSIUM SERPL-SCNC: 4.1 MMOL/L (ref 3.6–5.5)
PROT SERPL-MCNC: 5.5 G/DL (ref 6–8.2)
RBC # BLD AUTO: 2.64 M/UL (ref 4.7–6.1)
RBC BLD AUTO: PRESENT
SCHISTOCYTES BLD QL SMEAR: NORMAL
SODIUM SERPL-SCNC: 136 MMOL/L (ref 135–145)
WBC # BLD AUTO: 4.5 K/UL (ref 4.8–10.8)

## 2017-08-24 PROCEDURE — 770004 HCHG ROOM/CARE - ONCOLOGY PRIVATE *

## 2017-08-24 PROCEDURE — A9270 NON-COVERED ITEM OR SERVICE: HCPCS | Performed by: HOSPITALIST

## 2017-08-24 PROCEDURE — 99232 SBSQ HOSP IP/OBS MODERATE 35: CPT | Performed by: HOSPITALIST

## 2017-08-24 PROCEDURE — 700102 HCHG RX REV CODE 250 W/ 637 OVERRIDE(OP): Performed by: HOSPITALIST

## 2017-08-24 PROCEDURE — 700111 HCHG RX REV CODE 636 W/ 250 OVERRIDE (IP): Performed by: HOSPITALIST

## 2017-08-24 PROCEDURE — 700111 HCHG RX REV CODE 636 W/ 250 OVERRIDE (IP): Performed by: INTERNAL MEDICINE

## 2017-08-24 RX ORDER — DEXAMETHASONE 4 MG/1
4 TABLET ORAL EVERY 12 HOURS
Status: DISCONTINUED | OUTPATIENT
Start: 2017-08-24 | End: 2017-08-26 | Stop reason: HOSPADM

## 2017-08-24 RX ORDER — IBUPROFEN 400 MG/1
600 TABLET ORAL EVERY 6 HOURS PRN
Status: DISCONTINUED | OUTPATIENT
Start: 2017-08-24 | End: 2017-08-26 | Stop reason: HOSPADM

## 2017-08-24 RX ORDER — TRAMADOL HYDROCHLORIDE 50 MG/1
100 TABLET ORAL EVERY 6 HOURS
Status: DISCONTINUED | OUTPATIENT
Start: 2017-08-24 | End: 2017-08-26 | Stop reason: HOSPADM

## 2017-08-24 RX ORDER — MORPHINE SULFATE 100 MG/5ML
10 SOLUTION ORAL
Status: DISCONTINUED | OUTPATIENT
Start: 2017-08-24 | End: 2017-08-26 | Stop reason: HOSPADM

## 2017-08-24 RX ORDER — OMEPRAZOLE 20 MG/1
20 CAPSULE, DELAYED RELEASE ORAL DAILY
Status: DISCONTINUED | OUTPATIENT
Start: 2017-08-24 | End: 2017-08-26 | Stop reason: HOSPADM

## 2017-08-24 RX ADMIN — PREDNISONE 40 MG: 20 TABLET ORAL at 07:49

## 2017-08-24 RX ADMIN — STANDARDIZED SENNA CONCENTRATE AND DOCUSATE SODIUM 2 TABLET: 8.6; 5 TABLET, FILM COATED ORAL at 20:43

## 2017-08-24 RX ADMIN — TRAMADOL HYDROCHLORIDE 50 MG: 50 TABLET, COATED ORAL at 03:42

## 2017-08-24 RX ADMIN — MORPHINE SULFATE 10 MG: 100 SOLUTION ORAL at 11:29

## 2017-08-24 RX ADMIN — STANDARDIZED SENNA CONCENTRATE AND DOCUSATE SODIUM 2 TABLET: 8.6; 5 TABLET, FILM COATED ORAL at 07:49

## 2017-08-24 RX ADMIN — DEXAMETHASONE 4 MG: 4 TABLET ORAL at 20:44

## 2017-08-24 RX ADMIN — DEXAMETHASONE 4 MG: 4 TABLET ORAL at 11:30

## 2017-08-24 RX ADMIN — TRAMADOL HYDROCHLORIDE 50 MG: 50 TABLET, COATED ORAL at 09:37

## 2017-08-24 RX ADMIN — OMEPRAZOLE 20 MG: 20 CAPSULE, DELAYED RELEASE ORAL at 11:30

## 2017-08-24 RX ADMIN — KETOROLAC TROMETHAMINE 15 MG: 30 INJECTION, SOLUTION INTRAMUSCULAR at 05:24

## 2017-08-24 RX ADMIN — MORPHINE SULFATE 10 MG: 100 SOLUTION ORAL at 14:53

## 2017-08-24 RX ADMIN — ENOXAPARIN SODIUM 40 MG: 100 INJECTION SUBCUTANEOUS at 07:49

## 2017-08-24 RX ADMIN — IBUPROFEN 600 MG: 400 TABLET, FILM COATED ORAL at 11:29

## 2017-08-24 RX ADMIN — GABAPENTIN 300 MG: 300 CAPSULE ORAL at 14:53

## 2017-08-24 RX ADMIN — GABAPENTIN 300 MG: 300 CAPSULE ORAL at 20:43

## 2017-08-24 RX ADMIN — GABAPENTIN 300 MG: 300 CAPSULE ORAL at 07:49

## 2017-08-24 ASSESSMENT — ENCOUNTER SYMPTOMS
DIARRHEA: 0
SENSORY CHANGE: 1
VOMITING: 0
HEARTBURN: 0
ABDOMINAL PAIN: 1
HEMOPTYSIS: 0
DIZZINESS: 0
HEADACHES: 0
FOCAL WEAKNESS: 1
TINGLING: 0
COUGH: 0
NAUSEA: 0
NAUSEA: 1
DIAPHORESIS: 0
CHILLS: 0
WEIGHT LOSS: 1
SHORTNESS OF BREATH: 0
WEAKNESS: 1
PALPITATIONS: 0
CONSTIPATION: 1
ABDOMINAL PAIN: 0
FEVER: 0
BACK PAIN: 1
SPUTUM PRODUCTION: 0

## 2017-08-24 ASSESSMENT — PAIN SCALES - GENERAL
PAINLEVEL_OUTOF10: 1
PAINLEVEL_OUTOF10: 1
PAINLEVEL_OUTOF10: 8
PAINLEVEL_OUTOF10: 5
PAINLEVEL_OUTOF10: 3
PAINLEVEL_OUTOF10: 3

## 2017-08-24 NOTE — DISCHARGE PLANNING
Received choice from PROMISE Cosby for Hospice services for patient, referral has been sent to [place] per request.

## 2017-08-24 NOTE — PROGRESS NOTES
Received report from day shift RN, assumed care of patient at 1900. AOx4. x1 assist. Complaining of 3/10 pain, medicated per MAR. Denies nausea. PIV with fluids infusing. Call light within reach, bed in low and locked position. No other needs at this time.

## 2017-08-24 NOTE — PROGRESS NOTES
Received sleeping during report with wife at the bedside  AM Assessment completed see Doc flow sheet for reference.

## 2017-08-24 NOTE — DISCHARGE PLANNING
Medical SW    Referral: Sw spoke to [facility] RN Makenzie.    Intervention: She spoke w/ pt and family at bedside. They have chosen [facility] and completed the choice form.     Karen faxed to [facility], Martha. Karen received fax confirmation. Karen spoke to Martha.    Karen spoke to hopspitalist, Dr Rodrigues. Karen updated as above.    Plan: Sw to assist w/ d/c planning as needed.

## 2017-08-24 NOTE — CARE PLAN
Problem: Communication  Goal: The ability to communicate needs accurately and effectively will improve  Outcome: PROGRESSING AS EXPECTED  Patient and significant other/support system have been informed on the plan of care for the shift, all questions answered.     Problem: Safety  Goal: Will remain free from injury  Outcome: PROGRESSING AS EXPECTED  Pt assessed for fall risks. Educated on use of call light, encouraged pt to call for assistance.

## 2017-08-24 NOTE — CONSULTS
Palliative Care H&P    Author: Toi Rincon    Date & Time note created:    8/24/2017   12:51 PM       Date of Consult: 8/23/17   Requesting Physician: Mitchel Rodrigues M.D.   Consulting Physician: Roxi Bush M.D.  Reason for Consult: Intractable pain    History of Present Illness:    Patient is a 57 year old male with Metastatic prostate carcinoma being consulted by Palliative care for intractable pain. Initial treatment for cancer was begun in 2007 with a prostatectomy. He also received radiation therapy for persistently elevated PSA.  In 2015 he presented with hemoptysis and was found to have metastatic disease in the L suprahilar region of his lung.  Additional bony and liver mets were discovered at that time, and he has had several radiation therapy treatments focused on these mets. He presents on this admission secondary to intractable pain from his bony metastases. His primary source of pain is centered around his left hip and leg, which required him to use a wheelchair for ambulation. At home he was taking Gabapentin and Ultram to manage his pain.    He describes several areas of pain today. These include b/l shoulders, abdominal, left hip, and left leg. At the time of interview his pain was at a 6/10, and he endorsed that while he receives good relief with current medications, there is a several hour window where his PRN pain meds will wear off before he may receive another dose. His shoulder pain is dull and aching and does not limit strength or ROM. The abdominal pain is also aching, is worsened by eating and by changes in position. The leg and hip pain is the most severe and is associated with shooting neuropathic pain, weakness, and associated numbness. The leg/hip pain is exacerbated by movement. The pain has somewhat improved over this admission as he has been able to walk with assistance to the bathroom and can sit on the toilet requiring assistance to stand back up.    He also reports some  nausea. This comes and goes randomly and does not appear to be related to one specific medication. He endorsed some nausea this morning and claimed zofran has given him some relief. He also notes having an appetite when he is not nauseous but his ability to eat a full meal is limited by abdominal pain. He endorses a roughly 7% weight loss in the past 3-4 weeks.     Patient expressed a desire to avoid narcotic pain medications if possible as he does not like the mental status changes and drowsiness he experiences.    Patient has decided to decline further palliative therapies including radiation and chemotherapy, desires home hospice care which is currently being set up.          Review of Systems:     Review of Systems   Constitutional: Positive for weight loss and malaise/fatigue. Negative for fever and chills.   Respiratory: Negative for cough, sputum production and shortness of breath.    Cardiovascular: Negative for chest pain, palpitations and leg swelling.   Gastrointestinal: Positive for nausea, abdominal pain and constipation. Negative for heartburn, vomiting and diarrhea.   Musculoskeletal: Positive for back pain.   Neurological: Positive for sensory change, focal weakness and weakness. Negative for dizziness, tingling and headaches.       Physical Exam:  Physical Exam   Constitutional: He is oriented to person, place, and time.   Cardiovascular: Normal rate, regular rhythm, normal heart sounds and intact distal pulses.    Pulmonary/Chest: Effort normal and breath sounds normal.   Abdominal: Soft. He exhibits no distension. There is tenderness. There is no rebound and no guarding.   Musculoskeletal: He exhibits tenderness.   Severe pain experienced in L hip with straight leg raise of b/l lower extremities  Weakness of jorge/plantar flexion of b/l feet, L weaker than R   Neurological: He is alert and oriented to person, place, and time.         Past Medical History: Metastatic Prostate Ca      Past Surgical  "History: Prostatectomy 2007      Current Outpatient Medications:  Home Medications     Reviewed by Delbert Rubio (Pharmacy Tech) on 08/22/17 at 1603  Med List Status: Complete    Medication Last Dose Status    gabapentin (NEURONTIN) 300 MG Cap 8/21/2017 Active    leuprolide (LUPRON) 22.5 MG Kit 2monthsago Active    predniSONE (DELTASONE) 5 MG Tab 8/20/2017 Active    senna-docusate (PERICOLACE OR SENOKOT S) 8.6-50 MG Tab unknown Active    tramadol (ULTRAM) 50 MG Tab unknown Active                Medication Allergy/Sensitivities:  No Known Allergies      Family History:   Father: lung cancer and myocardial infarction    Mother: stroke.       Social History:   Smoking: Former smoker, quit 30 years prior  Alcohol: Former heavy user, not current  Illicit drugs: None   Living situation: Lives with wife    Palliative Performance Scale: 60%    Spiritual History: Yes, does endorse he has a family friend who can assist with spiritual needs      Advanced Care Planning and Goals of Care: Desires to go home on Hospice care, desires no further chemo/radiation      Vitals:  Weight/BMI: Body mass index is 27.53 kg/(m^2).  /80 mmHg  Pulse 104  Temp(Src) 36.2 °C (97.2 °F)  Resp 18  Ht 1.753 m (5' 9\")  Wt 84.6 kg (186 lb 8.2 oz)  BMI 27.53 kg/m2  SpO2 91%  Filed Vitals:    08/23/17 1600 08/23/17 1901 08/24/17 0329 08/24/17 0800   BP: 114/76 111/69 105/67 130/80   Pulse: 94 95 101 104   Temp: 36.1 °C (97 °F) 36.4 °C (97.6 °F) 36.2 °C (97.2 °F) 36.2 °C (97.2 °F)   Resp: 18 16 16 18   Height:       Weight:       SpO2: 98% 99% 94% 91%     Oxygen Therapy:  Pulse Oximetry: 91 %, O2 (LPM): 0, O2 Delivery: None (Room Air)      Lab Data Review:  Recent Results (from the past 24 hour(s))   CBC WITH DIFFERENTIAL    Collection Time: 08/23/17  4:35 PM   Result Value Ref Range    WBC 4.6 (L) 4.8 - 10.8 K/uL    RBC 2.65 (L) 4.70 - 6.10 M/uL    Hemoglobin 7.6 (L) 14.0 - 18.0 g/dL    Hematocrit 23.5 (L) 42.0 - 52.0 %    MCV 88.7 " 81.4 - 97.8 fL    MCH 28.7 27.0 - 33.0 pg    MCHC 32.3 (L) 33.7 - 35.3 g/dL    RDW 51.3 (H) 35.9 - 50.0 fL    Platelet Count 116 (L) 164 - 446 K/uL    MPV 8.7 (L) 9.0 - 12.9 fL    Nucleated RBC 2.60 /100 WBC    NRBC (Absolute) 0.12 K/uL    Neutrophils-Polys 89.90 (H) 44.00 - 72.00 %    Lymphocytes 1.80 (L) 22.00 - 41.00 %    Monocytes 2.80 0.00 - 13.40 %    Eosinophils 0.00 0.00 - 6.90 %    Basophils 0.00 0.00 - 1.80 %    Neutrophils (Absolute) 4.31 1.82 - 7.42 K/uL    Lymphs (Absolute) 0.08 (L) 1.00 - 4.80 K/uL    Monos (Absolute) 0.13 0.00 - 0.85 K/uL    Eos (Absolute) 0.00 0.00 - 0.51 K/uL    Baso (Absolute) 0.00 0.00 - 0.12 K/uL    Anisocytosis 1+     Microcytosis 1+    DIFFERENTIAL MANUAL    Collection Time: 08/23/17  4:35 PM   Result Value Ref Range    Bands-Stabs 3.70 0.00 - 10.00 %    Metamyelocytes 1.80 %    Manual Diff Status PERFORMED    PERIPHERAL SMEAR REVIEW    Collection Time: 08/23/17  4:35 PM   Result Value Ref Range    Peripheral Smear Review see below    PLATELET ESTIMATE    Collection Time: 08/23/17  4:35 PM   Result Value Ref Range    Plt Estimation Decreased    MORPHOLOGY    Collection Time: 08/23/17  4:35 PM   Result Value Ref Range    RBC Morphology Present     Giant Platelets 1+     Poikilocytosis 1+     Ovalocytes 1+    COMP METABOLIC PANEL    Collection Time: 08/23/17 11:52 PM   Result Value Ref Range    Sodium 136 135 - 145 mmol/L    Potassium 4.1 3.6 - 5.5 mmol/L    Chloride 104 96 - 112 mmol/L    Co2 22 20 - 33 mmol/L    Anion Gap 10.0 0.0 - 11.9    Glucose 97 65 - 99 mg/dL    Bun 29 (H) 8 - 22 mg/dL    Creatinine 0.61 0.50 - 1.40 mg/dL    Calcium 8.3 (L) 8.5 - 10.5 mg/dL    AST(SGOT) 67 (H) 12 - 45 U/L    ALT(SGPT) 47 2 - 50 U/L    Alkaline Phosphatase 676 (H) 30 - 99 U/L    Total Bilirubin 1.0 0.1 - 1.5 mg/dL    Albumin 2.6 (L) 3.2 - 4.9 g/dL    Total Protein 5.5 (L) 6.0 - 8.2 g/dL    Globulin 2.9 1.9 - 3.5 g/dL    A-G Ratio 0.9 g/dL   CBC WITH DIFFERENTIAL    Collection Time:  08/23/17 11:52 PM   Result Value Ref Range    WBC 4.5 (L) 4.8 - 10.8 K/uL    RBC 2.64 (L) 4.70 - 6.10 M/uL    Hemoglobin 7.4 (L) 14.0 - 18.0 g/dL    Hematocrit 23.5 (L) 42.0 - 52.0 %    MCV 89.0 81.4 - 97.8 fL    MCH 28.0 27.0 - 33.0 pg    MCHC 31.5 (L) 33.7 - 35.3 g/dL    RDW 51.3 (H) 35.9 - 50.0 fL    Platelet Count 123 (L) 164 - 446 K/uL    MPV 8.5 (L) 9.0 - 12.9 fL    Nucleated RBC 5.30 /100 WBC    NRBC (Absolute) 0.24 K/uL    Neutrophils-Polys 82.60 (H) 44.00 - 72.00 %    Lymphocytes 4.40 (L) 22.00 - 41.00 %    Monocytes 4.30 0.00 - 13.40 %    Eosinophils 0.90 0.00 - 6.90 %    Basophils 0.00 0.00 - 1.80 %    Neutrophils (Absolute) 3.99 1.82 - 7.42 K/uL    Lymphs (Absolute) 0.20 (L) 1.00 - 4.80 K/uL    Monos (Absolute) 0.19 0.00 - 0.85 K/uL    Eos (Absolute) 0.04 0.00 - 0.51 K/uL    Baso (Absolute) 0.00 0.00 - 0.12 K/uL    Anisocytosis 2+     Microcytosis 2+    ESTIMATED GFR    Collection Time: 08/23/17 11:52 PM   Result Value Ref Range    GFR If African American >60 >60 mL/min/1.73 m 2    GFR If Non African American >60 >60 mL/min/1.73 m 2   DIFFERENTIAL MANUAL    Collection Time: 08/23/17 11:52 PM   Result Value Ref Range    Bands-Stabs 6.10 0.00 - 10.00 %    Metamyelocytes 1.70 %    Manual Diff Status PERFORMED    PERIPHERAL SMEAR REVIEW    Collection Time: 08/23/17 11:52 PM   Result Value Ref Range    Peripheral Smear Review see below    PLATELET ESTIMATE    Collection Time: 08/23/17 11:52 PM   Result Value Ref Range    Plt Estimation Decreased    MORPHOLOGY    Collection Time: 08/23/17 11:52 PM   Result Value Ref Range    RBC Morphology Present     Polychromia 1+     Poikilocytosis 1+     Ovalocytes 1+     Schistocytes 1+     Basophilic Stippling Few        Imaging/Procedures Review:    MR-LUMBAR SPINE-WITH & W/O   Final Result      1.  Diffuse bone metastasis.   2.  Anterior epidural tumor extension at the levels of L4 and L5 causing effacement of the thecal sac.Mild central canal stenosis is noted at  these levels. There is effacement of the lateral recess at the level of L5. The tumor is abutting the exiting    left L5 nerve root at the lateral recess.   3.   Multiple T2 hyperintensities in the liver likely representing metastasis.      MR-HIP WITH & W/O LEFT   Final Result      Extensive marrow replacement is compatible with diffuse metastatic disease as well as possible superimposed marrow reconversion following therapy      No pathologic fracture is detected      Mild edema in the left iliacus and gluteus musculature adjacent to presumed bony metastases most likely is reactive favored over secondary to neoplastic invasion      Prostatectomy. No lymphadenopathy.      DX-CHEST-LIMITED (1 VIEW)   Final Result         Elevation of the right hemidiaphragm and low right lung volume with minimal right basilar atelectasis.      Unchanged left suprahilar opacities.             Problem List/Recommendations:  1. Intractable Pain secondary to bony metastasis  - Will adjust Ultram dosing to 100 mg q6 hrs scheduled  - Switch ketorolac to ibuprofen 600 mg q6 PRN with food  - Switch Prednisone to Dexamethasone 4mg PO BID with food  - Will add omeprazole 20 mg daily for GI prophylaxis     2. Nausea  - Has PRN medications available, endorsed sufficient relief at this time. Will continue to monitor symptom with adjustment of meds    3. Weight loss  - Endorses some appetite but eating is limited by pain. Will hope that with pain medications and nausea medications patient may steadily increase oral intake.    4. Metastatic Prostate Ca  - Desires home hospice care, will go with [facility] due to insurance requirements    5. Hyponatremia  6. Hyperbilirubinemia  7. Anemia  -received one unit pRBC's yesterday        Total visit time was 75 minutes, with greater than 50% of the time spent in counseling and care coordination.

## 2017-08-24 NOTE — CARE PLAN
Problem: Pain Management  Goal: Pain level will decrease to patient’s comfort goal  Outcome: PROGRESSING SLOWER THAN EXPECTED  Pin not improving even post medications with Ultram  Seen by Dr Rodrigues with new pain meds ordered and started.  Intervention: Follow pain managment plan developed in collaboration with patient and Interdisciplinary Team  With new pain meds given, See MAR for med reference  Pain improved  Educated with pain management and explained indications and freq's  Intervention: Educate and implement non-pharmacologic comfort measures. Examples: relaxation, distration, play therapy, activity therapy, massage, etc.  Instructed to call Nurse at once when pain is not improving.

## 2017-08-24 NOTE — PROGRESS NOTES
[facility] Hospitalist Progress Note    Date of Service: 2017    Chief Complaint  57 y.o. male with history of advanced prostate cancer, metastasis known to the liver and bone admitted 2017 with intractable back pain    Interval Problem Update    Patient met with radiation oncology yesterday, he elected not to undergo palliative radiation to his lumbar spine  Also met with palliative care, he desires to go home with hospice  Still has pain at low back, radiating to left leg, better than on admission but unchanged in last 24 hours    Consultants/Specialty  None    Disposition  To be determined      Review of Systems   Constitutional: Negative for diaphoresis.   Respiratory: Negative for cough and hemoptysis.    Gastrointestinal: Negative for nausea, vomiting and abdominal pain.   Neurological: Positive for weakness.      Physical Exam  Laboratory/Imaging   Hemodynamics  Temp (24hrs), Av.4 °C (97.6 °F), Min:36.1 °C (97 °F), Max:37.1 °C (98.7 °F)   Temperature: 36.2 °C (97.2 °F)  Pulse  Av.1  Min: 94  Max: 113    Blood Pressure: 130/80 mmHg      Respiratory      Respiration: 18, Pulse Oximetry: 91 %        RUL Breath Sounds: Diminished, RML Breath Sounds: Diminished, RLL Breath Sounds: Diminished, CELIA Breath Sounds: Diminished, LLL Breath Sounds: Diminished    Fluids    Intake/Output Summary (Last 24 hours) at 17 1117  Last data filed at 17 0500   Gross per 24 hour   Intake   2279 ml   Output    240 ml   Net   2039 ml       Nutrition  Orders Placed This Encounter   Procedures   • DIET ORDER     Standing Status: Standing      Number of Occurrences: 1      Standing Expiration Date:      Order Specific Question:  Diet:     Answer:  Regular [1]     Physical Exam   Constitutional: He is oriented to person, place, and time. He appears well-nourished. No distress.   HENT:   Head: Normocephalic and atraumatic.   Eyes: Conjunctivae and EOM are normal. Right eye exhibits no discharge. Left eye  exhibits no discharge.   Neck: Normal range of motion. Neck supple.   Cardiovascular: Normal rate, regular rhythm and intact distal pulses.    Pulmonary/Chest: Effort normal and breath sounds normal. No respiratory distress. He has no wheezes. He has no rales.   Abdominal: Soft. Bowel sounds are normal. He exhibits no distension. There is no tenderness.   Musculoskeletal: He exhibits no edema.   Subjectively 5/5 strength in left LE   Neurological: He is alert and oriented to person, place, and time. No cranial nerve deficit.   5/5 strength in lower extremities bilaterally  Sensation light touch intact   Skin: Skin is warm and dry. No rash noted. He is not diaphoretic. No erythema.   Nursing note and vitals reviewed.      Recent Labs      08/22/17   2342  08/23/17   1635  08/23/17   2352   WBC  4.2*  4.6*  4.5*   RBC  2.51*  2.65*  2.64*   HEMOGLOBIN  7.0*  7.6*  7.4*   HEMATOCRIT  22.0*  23.5*  23.5*   MCV  87.6  88.7  89.0   MCH  27.9  28.7  28.0   MCHC  31.8*  32.3*  31.5*   RDW  53.4*  51.3*  51.3*   PLATELETCT  137*  116*  123*   MPV  8.4*  8.7*  8.5*     Recent Labs      08/21/17   2355  08/22/17   2342  08/23/17   2352   SODIUM  129*  131*  136   POTASSIUM  4.8  4.7  4.1   CHLORIDE  95*  101  104   CO2  24  23  22   GLUCOSE  146*  106*  97   BUN  23*  31*  29*   CREATININE  0.63  0.80  0.61   CALCIUM  9.1  8.2*  8.3*                      Assessment/Plan     * Intractable back pain (present on admission)  Assessment & Plan  Patient has intractable pain in his low back radiating to his left leg  Subacute presentation, this is been going on for several weeks   MRI shows extensive bone and bone marrow metastasis  There is tumor affecting the nerve root at L5  Appreciate radiation oncology consult  Patient has elected not to have any radiation  Palliative care consulting    Anemia (present on admission)  Assessment & Plan  Anemia is likely due to bone marrow infiltration  No need to check further CBC or transfuse  blood products with his current goals of care    Prostate cancer metastatic to bone (CMS-HCC) (present on admission)  Assessment & Plan  Advanced disease with mets to liver and bone  Palliative Care/Hospice    Hyponatremia (present on admission)  Assessment & Plan  Improved    Hyperbilirubinemia (present on admission)  Assessment & Plan  Improved with hydration      Labs reviewed and Medications reviewed  Sanchez catheter: No Sanchez      DVT Prophylaxis: Enoxaparin (Lovenox)

## 2017-08-25 PROCEDURE — A9270 NON-COVERED ITEM OR SERVICE: HCPCS | Performed by: HOSPITALIST

## 2017-08-25 PROCEDURE — 700111 HCHG RX REV CODE 636 W/ 250 OVERRIDE (IP): Performed by: HOSPITALIST

## 2017-08-25 PROCEDURE — 700102 HCHG RX REV CODE 250 W/ 637 OVERRIDE(OP): Performed by: HOSPITALIST

## 2017-08-25 PROCEDURE — 99232 SBSQ HOSP IP/OBS MODERATE 35: CPT | Performed by: HOSPITALIST

## 2017-08-25 PROCEDURE — 770004 HCHG ROOM/CARE - ONCOLOGY PRIVATE *

## 2017-08-25 RX ADMIN — TRAMADOL HYDROCHLORIDE 100 MG: 50 TABLET, COATED ORAL at 05:44

## 2017-08-25 RX ADMIN — OMEPRAZOLE 20 MG: 20 CAPSULE, DELAYED RELEASE ORAL at 09:08

## 2017-08-25 RX ADMIN — GABAPENTIN 300 MG: 300 CAPSULE ORAL at 09:08

## 2017-08-25 RX ADMIN — MORPHINE SULFATE 10 MG: 100 SOLUTION ORAL at 09:08

## 2017-08-25 RX ADMIN — TRAMADOL HYDROCHLORIDE 100 MG: 50 TABLET, COATED ORAL at 12:14

## 2017-08-25 RX ADMIN — ONDANSETRON 4 MG: 2 INJECTION INTRAMUSCULAR; INTRAVENOUS at 09:08

## 2017-08-25 RX ADMIN — TRAMADOL HYDROCHLORIDE 100 MG: 50 TABLET, COATED ORAL at 00:43

## 2017-08-25 RX ADMIN — GABAPENTIN 300 MG: 300 CAPSULE ORAL at 17:35

## 2017-08-25 RX ADMIN — STANDARDIZED SENNA CONCENTRATE AND DOCUSATE SODIUM 2 TABLET: 8.6; 5 TABLET, FILM COATED ORAL at 09:08

## 2017-08-25 RX ADMIN — DEXAMETHASONE 4 MG: 4 TABLET ORAL at 19:28

## 2017-08-25 RX ADMIN — MORPHINE SULFATE 10 MG: 100 SOLUTION ORAL at 17:32

## 2017-08-25 RX ADMIN — MORPHINE SULFATE 10 MG: 100 SOLUTION ORAL at 20:41

## 2017-08-25 RX ADMIN — DEXAMETHASONE 4 MG: 4 TABLET ORAL at 09:00

## 2017-08-25 RX ADMIN — STANDARDIZED SENNA CONCENTRATE AND DOCUSATE SODIUM 2 TABLET: 8.6; 5 TABLET, FILM COATED ORAL at 19:28

## 2017-08-25 RX ADMIN — GABAPENTIN 300 MG: 300 CAPSULE ORAL at 19:28

## 2017-08-25 ASSESSMENT — ENCOUNTER SYMPTOMS
HEMOPTYSIS: 0
NAUSEA: 0
VOMITING: 0
ABDOMINAL PAIN: 0
COUGH: 0

## 2017-08-25 ASSESSMENT — PAIN SCALES - GENERAL
PAINLEVEL_OUTOF10: 6
PAINLEVEL_OUTOF10: 4
PAINLEVEL_OUTOF10: 1
PAINLEVEL_OUTOF10: 1

## 2017-08-25 NOTE — DISCHARGE PLANNING
PROMISE spoke with Sadie from [facility] who advised that they will be out to assess pt tomorrow between 10 and 11 am. PROMISE provided her with contact numbers for weekend SW and [facility].

## 2017-08-25 NOTE — CARE PLAN
Problem: Safety  Goal: Will remain free from injury  Outcome: PROGRESSING AS EXPECTED  Bed low and locked with call light and personal belongings within reach.  Instructed to call for assistance.  Verbalized understanding.    Problem: Knowledge Deficit  Goal: Knowledge of disease process/condition, treatment plan, diagnostic tests, and medications will improve  Outcome: PROGRESSING AS EXPECTED  Patient updated on today's plan of care.

## 2017-08-25 NOTE — DISCHARGE PLANNING
SW left  for Sadie [phone]), [facility] Admission Coordinator. PROMISE requested a return call regarding the status of pt's referral.

## 2017-08-25 NOTE — PALLIATIVE CARE
"PALLIATIVE CARE FOLLOW-UP:    Pt resting quietly.  Provided empathic support to wife.  Recognized her courage in taking pt home with hospice honoring his wishes, though she feels \"safe\" here.  Validated her thoughts, feelings and fears.  Discussed anticipatory grief.  Provided child bereavement resources.    Thank you for allowing Palliative Care to support this pt and his family.  Contact [phone] for additional assistance, questions or concerns.  "

## 2017-08-25 NOTE — PROGRESS NOTES
[facility] Hospitalist Progress Note    Date of Service: 2017    Chief Complaint  57 y.o. male with history of advanced prostate cancer, metastasis known to the liver and bone admitted 2017 with intractable back pain    Interval Problem Update    Patient met with palliative care consult and yesterday  He did agree to take some roxanol, thus far he seems to be tolerating without too much sleepiness or confusion  Low back pain radiating to his left leg, at times severe, Roxanol is helpful  Not dizzy or lightheaded, no chest pain, no shortness of breath    Consultants/Specialty  Palliative care    Disposition  Home with home hospice today or tomorrow      Review of Systems   Respiratory: Negative for cough and hemoptysis.    Cardiovascular: Negative for chest pain and leg swelling.   Gastrointestinal: Negative for nausea, vomiting and abdominal pain.   Skin: Negative for itching and rash.      Physical Exam  Laboratory/Imaging   Hemodynamics  Temp (24hrs), Av.4 °C (97.6 °F), Min:36.1 °C (97 °F), Max:36.8 °C (98.3 °F)   Temperature: 36.1 °C (97 °F)  Pulse  Av.6  Min: 92  Max: 113    Blood Pressure: 133/92 mmHg      Respiratory      Respiration: 18, Pulse Oximetry: 94 %        RUL Breath Sounds: Diminished, RML Breath Sounds: Diminished, RLL Breath Sounds: Diminished, CELIA Breath Sounds: Diminished, LLL Breath Sounds: Diminished    Fluids  No intake or output data in the 24 hours ending 17 1504    Nutrition  Orders Placed This Encounter   Procedures   • DIET ORDER     Standing Status: Standing      Number of Occurrences: 1      Standing Expiration Date:      Order Specific Question:  Diet:     Answer:  Regular [1]     Physical Exam   Constitutional: He is oriented to person, place, and time. He appears well-nourished. No distress.   HENT:   Head: Normocephalic and atraumatic.   Neck: Normal range of motion. Neck supple.   Cardiovascular: Normal rate, regular rhythm and intact distal pulses.     Pulmonary/Chest: Effort normal and breath sounds normal. No respiratory distress. He has no wheezes. He has no rales.   Abdominal: Soft. Bowel sounds are normal. He exhibits no distension. There is no tenderness. There is no guarding.   Musculoskeletal: He exhibits no edema.   Subjectively 5/5 strength in left LE   Neurological: He is alert and oriented to person, place, and time. No cranial nerve deficit.   5/5 strength in lower extremities bilaterally  Sensation light touch intact   Skin: Skin is warm and dry. He is not diaphoretic. No pallor.   Psychiatric: He has a normal mood and affect. His behavior is normal. Judgment and thought content normal.   Nursing note and vitals reviewed.      Recent Labs      08/22/17   2342  08/23/17   1635  08/23/17   2352   WBC  4.2*  4.6*  4.5*   RBC  2.51*  2.65*  2.64*   HEMOGLOBIN  7.0*  7.6*  7.4*   HEMATOCRIT  22.0*  23.5*  23.5*   MCV  87.6  88.7  89.0   MCH  27.9  28.7  28.0   MCHC  31.8*  32.3*  31.5*   RDW  53.4*  51.3*  51.3*   PLATELETCT  137*  116*  123*   MPV  8.4*  8.7*  8.5*     Recent Labs      08/22/17   2342  08/23/17   2352   SODIUM  131*  136   POTASSIUM  4.7  4.1   CHLORIDE  101  104   CO2  23  22   GLUCOSE  106*  97   BUN  31*  29*   CREATININE  0.80  0.61   CALCIUM  8.2*  8.3*                      Assessment/Plan     * Intractable back pain (present on admission)  Assessment & Plan  Patient has intractable pain in his low back radiating to his left leg  Subacute presentation, this is been going on for several weeks   MRI shows extensive bone and bone marrow metastasis  There is tumor affecting the nerve root at L5  Appreciate radiation oncology consult  Patient has elected not to have any radiation  Palliative care consulting  Patient desires home hospice, possibly home today or tomorrow when home DME and hospice can be arranged    Anemia (present on admission)  Assessment & Plan  Anemia is likely due to bone marrow infiltration  No need to check further  CBC or transfuse blood products with his current goals of care    Prostate cancer metastatic to bone (CMS-HCC) (present on admission)  Assessment & Plan  Advanced disease with mets to liver and bone  Palliative Care/Hospice    Hyperbilirubinemia (present on admission)  Assessment & Plan  Improved with hydration      Medications reviewed  Sanchez catheter: No Sanchez

## 2017-08-25 NOTE — PROGRESS NOTES
Received report and assumed patient care at change of shift. Patient is resting in bed, A/O x4. Patient reports 3/10 pain at this time, medications provided per MAR.    PIV is patent, no s/s of infection or infiltration noted at this time.    Plan of care discussed, questions answered. Bed is in the lowest position and locked, call light within reach, non-skid socks in place, hourly rounding. Patient reports no further needs and this time.

## 2017-08-26 VITALS
OXYGEN SATURATION: 89 % | RESPIRATION RATE: 18 BRPM | WEIGHT: 186.51 LBS | SYSTOLIC BLOOD PRESSURE: 127 MMHG | TEMPERATURE: 97.1 F | BODY MASS INDEX: 27.62 KG/M2 | DIASTOLIC BLOOD PRESSURE: 75 MMHG | HEIGHT: 69 IN | HEART RATE: 113 BPM

## 2017-08-26 PROCEDURE — A9270 NON-COVERED ITEM OR SERVICE: HCPCS | Performed by: HOSPITALIST

## 2017-08-26 PROCEDURE — 99239 HOSP IP/OBS DSCHRG MGMT >30: CPT | Performed by: HOSPITALIST

## 2017-08-26 PROCEDURE — 700111 HCHG RX REV CODE 636 W/ 250 OVERRIDE (IP): Performed by: HOSPITALIST

## 2017-08-26 PROCEDURE — 700102 HCHG RX REV CODE 250 W/ 637 OVERRIDE(OP): Performed by: HOSPITALIST

## 2017-08-26 RX ADMIN — TRAMADOL HYDROCHLORIDE 100 MG: 50 TABLET, COATED ORAL at 00:00

## 2017-08-26 RX ADMIN — OMEPRAZOLE 20 MG: 20 CAPSULE, DELAYED RELEASE ORAL at 08:37

## 2017-08-26 RX ADMIN — GABAPENTIN 300 MG: 300 CAPSULE ORAL at 14:53

## 2017-08-26 RX ADMIN — STANDARDIZED SENNA CONCENTRATE AND DOCUSATE SODIUM 2 TABLET: 8.6; 5 TABLET, FILM COATED ORAL at 09:00

## 2017-08-26 RX ADMIN — MORPHINE SULFATE 10 MG: 100 SOLUTION ORAL at 05:58

## 2017-08-26 RX ADMIN — TRAMADOL HYDROCHLORIDE 100 MG: 50 TABLET, COATED ORAL at 06:00

## 2017-08-26 RX ADMIN — ONDANSETRON 4 MG: 2 INJECTION INTRAMUSCULAR; INTRAVENOUS at 12:06

## 2017-08-26 RX ADMIN — MORPHINE SULFATE 10 MG: 100 SOLUTION ORAL at 16:51

## 2017-08-26 RX ADMIN — GABAPENTIN 300 MG: 300 CAPSULE ORAL at 08:37

## 2017-08-26 RX ADMIN — DEXAMETHASONE 4 MG: 4 TABLET ORAL at 08:37

## 2017-08-26 RX ADMIN — TRAMADOL HYDROCHLORIDE 100 MG: 50 TABLET, COATED ORAL at 12:21

## 2017-08-26 RX ADMIN — MORPHINE SULFATE 10 MG: 100 SOLUTION ORAL at 14:53

## 2017-08-26 ASSESSMENT — PAIN SCALES - GENERAL
PAINLEVEL_OUTOF10: 2
PAINLEVEL_OUTOF10: 2

## 2017-08-26 ASSESSMENT — LIFESTYLE VARIABLES: EVER_SMOKED: YES

## 2017-08-26 NOTE — DISCHARGE PLANNING
PROMISE spoke with  from [facility] who advised they have accepted pt and pt would like to dc home today. Per , pt has a hospital bed at home and they will be ordering the rest of his equipment.     1400 PROMISE verified with RN that pt can transfer via wheelchair. Transport form faxed to [facility] requesting 1600 transport time. Awaiting verification of transport time.

## 2017-08-26 NOTE — PROGRESS NOTES
Received report and assumed patient care at change of shift. Patient is resting in bed, A/O x4. Patient reports 6/10 pain at this time, medications provided per MAR.    PIV is patent with no s/s of infection or infiltration noted at this time.    Plan of care discussed, questions answered. Bed is in the lowest position and locked, call light within reach, non-skid socks in place, hourly rounding. Patient reports no further needs and this time.

## 2017-08-26 NOTE — PALLIATIVE CARE
"Palliative Care follow-up  PC RN visited with Diana and her friend at bedside to provide support; Rhys was sleeping. Cristiana feels like today has been a \"rough day\" and she woke up with anxiety about going home today. PC RN reminded her that \"you are allowed to have good days and bad days, and these bad days are sometimes needed to be able to re-group.\" She and her friend agreed. She notes that \"this is about him now, not me.\" PC RN validated her feelings and concerns for returning home knowing she feels \"safe\" here. She expressed gratitude for all the help she's received and not terribly happy about not being able to utilize [facility] but expressed understanding of being able to have \"the best of both worlds\" with a hospice team and her friends with medical/hospice backgrounds. Diana was thankful for the visit and continued support. PC RN encouraged her to reach out if she needs anything at all, even once she returns home; she was agreeable and thankful.     Plan: continue to support    Thank you for allowing Palliative Care to participate in this patient's care. Please feel free to call [phone] with any questions or concerns.  "

## 2017-08-26 NOTE — DISCHARGE PLANNING
PROMISE notified  with [facility] advising her of dc time. RN is aware of dc time.  requested that pt be medicated before transfer. PROMISE call to speak with RN regarding this. RN advised she will let pt's wife know of transport time.

## 2017-08-26 NOTE — DISCHARGE PLANNING
Per [facility], patient is now to transfer via [facility].  Patient to transfer to his home today at 1630 via [facility]. Kurt in [facility] transport has been advised to cancel transport for today. [facility] has been advised of transport time.

## 2017-08-26 NOTE — DISCHARGE INSTRUCTIONS
Discharge Instructions    Discharged to home by medical transportation ([facility]) with relative. Discharged via ambulance, hospital escort: Yes.  Special equipment needed: Not Applicable    Be sure to schedule a follow-up appointment with your primary care doctor or any specialists as instructed.     Discharge Plan:   Influenza Vaccine Indication: Patient Refuses    I understand that a diet low in cholesterol, fat, and sodium is recommended for good health. Unless I have been given specific instructions below for another diet, I accept this instruction as my diet prescription.   Other diet: n/a    Special Instructions: None    · Is patient discharged on Warfarin / Coumadin?   No     · Is patient Post Blood Transfusion?  No    Depression / Suicide Risk    As you are discharged from this [facility] facility, it is important to learn how to keep safe from harming yourself.    Recognize the warning signs:  · Abrupt changes in personality, positive or negative- including increase in energy   · Giving away possessions  · Change in eating patterns- significant weight changes-  positive or negative  · Change in sleeping patterns- unable to sleep or sleeping all the time   · Unwillingness or inability to communicate  · Depression  · Unusual sadness, discouragement and loneliness  · Talk of wanting to die  · Neglect of personal appearance   · Rebelliousness- reckless behavior  · Withdrawal from people/activities they love  · Confusion- inability to concentrate     If you or a loved one observes any of these behaviors or has concerns about self-harm, here's what you can do:  · Talk about it- your feelings and reasons for harming yourself  · Remove any means that you might use to hurt yourself (examples: pills, rope, extension cords, firearm)  · Get professional help from the community (Mental Health, Substance Abuse, psychological counseling)  · Do not be alone:Call your Safe Contact- someone whom you trust who will be there  for you.  · Call your local CRISIS HOTLINE 283-5167 or 518-054-1761  · Call your local Children's Mobile Crisis Response Team [place] (101) 316-0639 or www.[employer]  · Call the toll free National Suicide Prevention Hotlines   · National Suicide Prevention Lifeline 598-004-XSKD (0923)  · National [employer] - Financial Reconciliation Line Network 800-SUICIDE (082-4510)

## 2017-08-26 NOTE — DISCHARGE PLANNING
PROMISE spoke with palliative RN who advised that due to pt's mets to bone, he would need [facility] transport. PROMISE faxed [facility] PCS form to [facility] requesting 1630 transport time. Awaiting verification of transport time.

## 2017-08-26 NOTE — PROGRESS NOTES
Pt. A/ox4, VSS, complains of pain 2/10 to back but declines pain meds this morning. Resting comfortably in bed. Ambulates with standby assistance. Awaiting [facility] assessment to move forward. Discussed plan of care with pt. And spouse . Questions answered. Hourly rounding in place.

## 2017-08-26 NOTE — PALLIATIVE CARE
Palliative Care follow-up  Plan for returning home with hospice today; POLST completed, signed and scanned into EMR. POLST left on front of chart for time of transportation. Discussed means of transport with BS team, family and SW. Due to his pain and extent of metastasis, the patient is not able to sit in a wheelchair for any length of time, certainly not an hour. PC RN updated the RN and SW about need for [facility]. SW calling [facility] to arrange.    Updated: BS team, family and SW    Plan: home with hospice today    Thank you for allowing Palliative Care to participate in this patient's care. Please feel free to call [phone] with any questions or concerns.

## 2017-08-27 NOTE — PROGRESS NOTES
Pt. Discharged home via [facility] with [facility] to f/u out pt. Discussed discharge instructions and plan of care with pt. And family questions answered. PIV d/c'd and hemostasis achieved.     [facility] ID badge verified before d/c. Wife to ride with pt. And [facility] to home.

## 2017-08-27 NOTE — DISCHARGE SUMMARY
DATE OF DISCHARGE:  08/26/2017    Patient was seen and examined on 08/26/2017.  This note documents that   encounter.    PRIMARY ONCOLOGIST:  Dr. Lisandro Price.    RADIATION ONCOLOGIST:  Dr. Shilpa Doss.    DISCHARGE DIAGNOSES:  1.  Intractable cancer related pain from bony metastasis.  2.  Widely metastatic prostate cancer.  3.  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  4.  Hyponatremia.  5.  Hyperbilirubinemia.    MAJOR STUDIES AND PROCEDURES PERFORMED INPATIENT:  MRI of the hip and lumbar   spine, extensive marrow replacement compatible with diffuse metastatic   disease, anterior rupture of tumor extension at the level of the L4-L5 causing   effacement of the thecal sac, mild central canal stenosis, tumor is abutting   the exiting L5 nerve root at the lateral recess, multiple T2 hyperintensities   in the liver likely representing metastasis.    HOSPITAL COURSE:  A 57-year-old male.  He has a known history of widely   metastatic prostate cancer.  Patient has recently had difficulty with pain   related to his bony metastases, he has had radiation treatment.  Patient was   sent to the hospital on 08/21/2017 by his primary oncologist for intractable   back pain radiating to the lower leg.  MRI exams did show evidence of bone   marrow invasion and replacement.  There was also an area at L4-L5 possible   focal tumor impingement on nerves that would likely explain his pain.  Patient   was provided with high-dose NSAIDs to control his pain.  He did get some   relief.  Patient did meet with Dr. Doss of radiation oncology.  Radiation   was offered, but when risks and benefits were discussed including simply the   pain of being on the radiation treatment table, the patient elected not to   continue any further radiation.  Further discussions were held including with   palliative care.  Patient has decided that he wished to go to home on hospice.    Hospice  arrangements have been made and patient will go home today with   hospice support.    DISCHARGE MEDICATIONS:  Discharge medications are to be determined by hospice   agency.  Patient has had some issues with narcotic pain medications in the   past.  At this time, I am recommending high-dose Motrin along with   dexamethasone to control his bony pain, supplement with tramadol and Roxanol   as needed.    DISCHARGE DISPOSITION:  To home with home hospice.    DIET:  Regular as tolerated.    FOLLOWUP PLAN:  Patient will be followed by hospice.    Forty minutes was spent discharging this patient from the hospital.       ____________________________________     MD DAVID ALLEN / MARSHALL    DD:  08/26/2017 15:38:10  DT:  08/26/2017 22:57:25    D#:  [id number]  Job#:  [id number]

## 2017-11-15 NOTE — ADDENDUM NOTE
Encounter addended by: Chasity Rene R.N. on: 11/15/2017  9:16 AM<BR>    Actions taken: Flowsheet accepted

## 2021-05-08 NOTE — DISCHARGE INSTRUCTIONS
MRI ADULT DISCHARGE INSTRUCTIONS    You have been medicated today for your scan. Please follow the instructions below to ensure your safe recovery. If you have any questions or problems, feel free to call us at [phone] or [phone].     1.   Have someone stay with you to assist you as needed.    2.   Do not drive or operate any mechanical devices.    3.   Do not perform any activity that requires concentration. Make no major decisions over the next 24 hours.     4.   Be careful changing positions from laying down to sitting or standing, as you may become dizzy.     5.   Do not drink alcohol for 48 hours.    6.   There are no restrictions for eating your normal meals. Drink fluids.    7.   You may continue your usual medications for pain, tranquilizers, muscle relaxants or sedatives when awake.     8.   Tomorrow, you may continue your normal daily activities.     9.   Pressure dressing on 10 - 15 minutes. If swelling or bleeding occurs when removed, continue placing direct pressure on injection site for another 5 minutes, or until bleeding stops.     I have been informed of and understand the above discharge instructions.   
General

## 2022-09-28 NOTE — PROGRESS NOTES
Received a message from Diana that both she and Rhys would not be able to make the [facility] appointments scheduled for tomorrow.  States she will call tomorrow to reschedule as they are currently on the road.  Cancelled appointments per her request.    .